# Patient Record
Sex: FEMALE | Race: BLACK OR AFRICAN AMERICAN | NOT HISPANIC OR LATINO | Employment: FULL TIME | URBAN - METROPOLITAN AREA
[De-identification: names, ages, dates, MRNs, and addresses within clinical notes are randomized per-mention and may not be internally consistent; named-entity substitution may affect disease eponyms.]

---

## 2017-01-07 ENCOUNTER — OFFICE VISIT (OUTPATIENT)
Dept: URGENT CARE | Facility: URGENT CARE | Age: 42
End: 2017-01-07
Payer: COMMERCIAL

## 2017-01-07 VITALS
TEMPERATURE: 98.5 F | WEIGHT: 190 LBS | DIASTOLIC BLOOD PRESSURE: 74 MMHG | OXYGEN SATURATION: 97 % | SYSTOLIC BLOOD PRESSURE: 124 MMHG | HEART RATE: 80 BPM | BODY MASS INDEX: 30.68 KG/M2

## 2017-01-07 DIAGNOSIS — R09.81 NASAL CONGESTION: Primary | ICD-10-CM

## 2017-01-07 DIAGNOSIS — R05.9 COUGH: ICD-10-CM

## 2017-01-07 PROCEDURE — 99213 OFFICE O/P EST LOW 20 MIN: CPT | Performed by: PHYSICIAN ASSISTANT

## 2017-01-07 RX ORDER — MOMETASONE FUROATE MONOHYDRATE 50 UG/1
2 SPRAY, METERED NASAL DAILY
Qty: 1 BOX | Refills: 1 | Status: SHIPPED | OUTPATIENT
Start: 2017-01-07 | End: 2017-02-24

## 2017-01-07 RX ORDER — CODEINE PHOSPHATE AND GUAIFENESIN 10; 100 MG/5ML; MG/5ML
1 SOLUTION ORAL EVERY 4 HOURS PRN
Qty: 180 ML | Refills: 0 | Status: SHIPPED | OUTPATIENT
Start: 2017-01-07 | End: 2017-02-24

## 2017-01-07 NOTE — NURSING NOTE
"Chief Complaint   Patient presents with     Urgent Care     Cough     since wednesday, sneezing, running nose        Initial /74 mmHg  Pulse 80  Temp(Src) 98.5  F (36.9  C) (Tympanic)  Wt 190 lb (86.183 kg)  SpO2 97% Estimated body mass index is 30.68 kg/(m^2) as calculated from the following:    Height as of 6/22/16: 5' 6\" (1.676 m).    Weight as of this encounter: 190 lb (86.183 kg).  BP completed using cuff size: regular    Nabila Villegas CMA.............................January 7, 2017 3:54 PM       "

## 2017-01-07 NOTE — PROGRESS NOTES
"SUBJECTIVE:   Marlen Solano is a 41 year old female presenting with a chief complaint of nasal congestion, rhinorrhea, \"cold symptoms\" and cough.  Has been sneezing.  No fevers . Denies SOB or chest pain  Onset of symptoms was 4 day(s) ago.  Course of illness is same   Severity mild  Current and Associated symptoms: none  Treatment measures tried include Fluids, OTC meds and Rest.  Predisposing factors include None.    Past Medical History   Diagnosis Date     NO ACTIVE PROBLEMS      Current Outpatient Prescriptions   Medication Sig Dispense Refill     mometasone (NASONEX) 50 MCG/ACT spray Spray 2 sprays into both nostrils daily 1 Box 1     guaiFENesin-codeine (ROBITUSSIN AC) 100-10 MG/5ML SOLN solution Take 5 mLs by mouth every 4 hours as needed for cough 180 mL 0     Social History   Substance Use Topics     Smoking status: Never Smoker      Smokeless tobacco: Not on file     Alcohol Use: No       ROS:  Review of systems negative except as stated above.    OBJECTIVE  :/74 mmHg  Pulse 80  Temp(Src) 98.5  F (36.9  C) (Tympanic)  Wt 190 lb (86.183 kg)  SpO2 97%  GENERAL APPEARANCE: healthy, alert and no distress  EYES: EOMI,  PERRL, conjunctiva clear  HENT: TM's normal bilaterally, nasal turbinates boggy with bluish hue, rhinorrhea clear, oral mucous membranes moist, no erythema noted and no sinus pain or pressure  NECK: supple, nontender, no lymphadenopathy  RESP: lungs clear to auscultation - no rales, rhonchi or wheezes  CV: regular rates and rhythm, normal S1 S2, no murmur noted  SKIN: no suspicious lesions or rashes    ASSESSMENT:  Cough and Nasal congestion [R09.81]    PLAN:  OTC decongestant/antihistamine, Warm packs to face and gargles.  Med as directed and OTC med for sx relief.  To FU with PCP as needed    See orders in Epic    "

## 2017-02-24 ENCOUNTER — OFFICE VISIT (OUTPATIENT)
Dept: INTERNAL MEDICINE | Facility: CLINIC | Age: 42
End: 2017-02-24
Payer: COMMERCIAL

## 2017-02-24 VITALS
SYSTOLIC BLOOD PRESSURE: 120 MMHG | TEMPERATURE: 98.3 F | OXYGEN SATURATION: 98 % | BODY MASS INDEX: 29.89 KG/M2 | HEIGHT: 66 IN | DIASTOLIC BLOOD PRESSURE: 80 MMHG | WEIGHT: 186 LBS | HEART RATE: 91 BPM

## 2017-02-24 DIAGNOSIS — R06.83 SNORING: ICD-10-CM

## 2017-02-24 DIAGNOSIS — R35.0 URINARY FREQUENCY: Primary | ICD-10-CM

## 2017-02-24 LAB
ALBUMIN UR-MCNC: NEGATIVE MG/DL
APPEARANCE UR: NORMAL
BACTERIA #/AREA URNS HPF: ABNORMAL /HPF
BILIRUB UR QL STRIP: NEGATIVE
COLOR UR AUTO: YELLOW
GLUCOSE UR STRIP-MCNC: NEGATIVE MG/DL
HGB UR QL STRIP: NEGATIVE
KETONES UR STRIP-MCNC: NEGATIVE MG/DL
LEUKOCYTE ESTERASE UR QL STRIP: NEGATIVE
MUCOUS THREADS #/AREA URNS LPF: PRESENT /LPF
NITRATE UR QL: NEGATIVE
NON-SQ EPI CELLS #/AREA URNS LPF: ABNORMAL /LPF
PH UR STRIP: 5.5 PH (ref 5–7)
RBC #/AREA URNS AUTO: ABNORMAL /HPF (ref 0–2)
SP GR UR STRIP: 1.02 (ref 1–1.03)
URN SPEC COLLECT METH UR: NORMAL
UROBILINOGEN UR STRIP-ACNC: 0.2 EU/DL (ref 0.2–1)
WBC #/AREA URNS AUTO: ABNORMAL /HPF (ref 0–2)

## 2017-02-24 PROCEDURE — 87086 URINE CULTURE/COLONY COUNT: CPT | Performed by: FAMILY MEDICINE

## 2017-02-24 PROCEDURE — 81001 URINALYSIS AUTO W/SCOPE: CPT | Performed by: FAMILY MEDICINE

## 2017-02-24 PROCEDURE — 99213 OFFICE O/P EST LOW 20 MIN: CPT | Performed by: FAMILY MEDICINE

## 2017-02-24 RX ORDER — SULFAMETHOXAZOLE/TRIMETHOPRIM 800-160 MG
1 TABLET ORAL 2 TIMES DAILY
Qty: 20 TABLET | Refills: 0 | Status: SHIPPED | OUTPATIENT
Start: 2017-02-24 | End: 2017-10-02

## 2017-02-24 NOTE — PATIENT INSTRUCTIONS
Take prescribed medication as directed.    We will reply on the urine culture.      Sleep evaluation to be done.

## 2017-02-24 NOTE — PROGRESS NOTES
"  CHIEF COMPLAINT    Frequent urination for 2 months.  Snoring.      HISTORY    She c/o frequent urination. Nocturia every 1 - 1 1/2 hours. No dysuria or fever.    Also c/o snoring. Hasn't had recent change in wt.     Patient Active Problem List   Diagnosis     CARDIOVASCULAR SCREENING; LDL GOAL LESS THAN 130       No current outpatient prescriptions on file.       REVIEW OF SYSTEMS    No back pain  No lower abd pain        Past Medical History   Diagnosis Date     NO ACTIVE PROBLEMS        EXAM  /80  Pulse 91  Temp 98.3  F (36.8  C) (Oral)  Ht 5' 6\" (1.676 m)  Wt 186 lb (84.4 kg)  SpO2 98%  BMI 30.02 kg/m2    Phar: looks like Mallampati class 3-4 post pharynx  Neck: no mass or thyromegaly  Chest: cl  No CVAT  Abd nontender    Results for orders placed or performed in visit on 02/24/17   UA reflex to Microscopic and Culture   Result Value Ref Range    Color Urine Yellow     Appearance Urine Slightly Cloudy     Glucose Urine Negative NEG mg/dL    Bilirubin Urine Negative NEG    Ketones Urine Negative NEG mg/dL    Specific Gravity Urine 1.025 1.003 - 1.035    Blood Urine Negative NEG    pH Urine 5.5 5.0 - 7.0 pH    Protein Albumin Urine Negative NEG mg/dL    Urobilinogen Urine 0.2 0.2 - 1.0 EU/dL    Nitrite Urine Negative NEG    Leukocyte Esterase Urine Negative NEG    Source Midstream Urine    Urine Microscopic   Result Value Ref Range    WBC Urine 2-5 (A) 0 - 2 /HPF    RBC Urine O - 2 0 - 2 /HPF    Squamous Epithelial /LPF Urine Moderate (A) FEW /LPF    Bacteria Urine Many (A) NEG /HPF    Mucous Urine Present (A) NEG /LPF       (R35.0) Urinary frequency  (primary encounter diagnosis)  Comment:   U/A minimally abnormal. U/C sent. I did start antibiotic  Plan: UA reflex to Microscopic and Culture, Urine         Microscopic, Urine Culture Aerobic Bacterial,         sulfamethoxazole-trimethoprim (BACTRIM         DS/SEPTRA DS) 800-160 MG per tablet          Urology consult if U/C unrevealing and if SX " continue.    (R06.83) Snoring  Comment:   Plan: SLEEP EVALUATION & MANAGEMENT REFERRAL - ADULT              Take prescribed medication as directed.    We will reply on the urine culture.      Sleep evaluation to be done.

## 2017-02-24 NOTE — MR AVS SNAPSHOT
After Visit Summary   2/24/2017    Marlen Solano    MRN: [id number]           Patient Information     Date Of Birth          1975        Visit Information        Provider Department      2/24/2017 11:00 AM Jaden Alford MD [facility]        Today's Diagnoses     Urinary frequency    -  1    Snoring          Care Instructions      Take prescribed medication as directed.    We will reply on the urine culture.      Sleep evaluation to be done.        Follow-ups after your visit        Additional Services     SLEEP EVALUATION & MANAGEMENT REFERRAL - ADULT       Please be aware that coverage of these services is subject to the terms and limitations of your health insurance plan.  Call member services at your health plan with any benefit or coverage questions.      Please bring the following to your appointment:    >>   List of current medications   >>   This referral request   >>   Any documents/labs given to you for this referral    [facility]  Ph [phone] (Age 18 and up)                  Future tests that were ordered for you today     Open Future Orders        Priority Expected Expires Ordered    SLEEP EVALUATION & MANAGEMENT REFERRAL - ADULT Routine  2/24/2018 2/24/2017            Who to contact     If you have questions or need follow up information about today's clinic visit or your schedule please contact [facility] directly at [phone].  Normal or non-critical lab and imaging results will be communicated to you by MyChart, letter or phone within 4 business days after the clinic has received the results. If you do not hear from us within 7 days, please contact the clinic through MyChart or phone. If you have a critical or abnormal lab result, we will notify you by phone as soon as possible.  Submit refill requests through [employer] or call your pharmacy and they will forward the refill request to us. Please allow 3 business  "days for your refill to be completed.          Additional Information About Your Visit        Melon Powerhart Information     [employer] lets you send messages to your doctor, view your test results, renew your prescriptions, schedule appointments and more. To sign up, go to [email or web address] . Click on \"Log in\" on the left side of the screen, which will take you to the Welcome page. Then click on \"Sign up Now\" on the right side of the page.     You will be asked to enter the access code listed below, as well as some personal information. Please follow the directions to create your username and password.     Your access code is: [email or web address]  Expires: 2017 11:56 AM     Your access code will  in 90 days. If you need help or a new code, please call your [place] clinic or [phone].        Care EveryWhere ID     This is your Care EveryWhere ID. This could be used by other organizations to access your [place] medical records  [id number]        Your Vitals Were     Pulse Temperature Height Pulse Oximetry BMI (Body Mass Index)       91 98.3  F (36.8  C) (Oral) 5' 6\" (1.676 m) 98% 30.02 kg/m2        Blood Pressure from Last 3 Encounters:   17 120/80   17 124/74   16 114/76    Weight from Last 3 Encounters:   17 186 lb (84.4 kg)   17 190 lb (86.2 kg)   16 184 lb (83.5 kg)              We Performed the Following     UA reflex to Microscopic and Culture     Urine Culture Aerobic Bacterial     Urine Microscopic          Today's Medication Changes          These changes are accurate as of: 17 11:56 AM.  If you have any questions, ask your nurse or doctor.               Start taking these medicines.        Dose/Directions    sulfamethoxazole-trimethoprim 800-160 MG per tablet   Commonly known as:  BACTRIM DS/SEPTRA DS   Used for:  Urinary frequency   Started by:  Jaden Alford MD        Dose:  1 tablet   Take 1 tablet by mouth 2 times daily   Quantity:  20 tablet "   Refills:  0            Where to get your medicines      These medications were sent to [facility] [place], [place] - [address]  [address] [facility] [address]     Phone:  [phone]     sulfamethoxazole-trimethoprim 800-160 MG per tablet                Primary Care Provider Office Phone # Fax #    Aure Ackerman LUIS Clemente [phone]       [facility] [address]  [facility] [address]        Thank you!     Thank you for choosing [facility]  for your care. Our goal is always to provide you with excellent care. Hearing back from our patients is one way we can continue to improve our services. Please take a few minutes to complete the written survey that you may receive in the mail after your visit with us. Thank you!             Your Updated Medication List - Protect others around you: Learn how to safely use, store and throw away your medicines at www.disposemymeds.org.          This list is accurate as of: 2/24/17 11:56 AM.  Always use your most recent med list.                   Brand Name Dispense Instructions for use    sulfamethoxazole-trimethoprim 800-160 MG per tablet    BACTRIM DS/SEPTRA DS    20 tablet    Take 1 tablet by mouth 2 times daily

## 2017-02-25 LAB
BACTERIA SPEC CULT: NO GROWTH
MICRO REPORT STATUS: NORMAL
SPECIMEN SOURCE: NORMAL

## 2017-06-29 ENCOUNTER — OFFICE VISIT (OUTPATIENT)
Dept: FAMILY MEDICINE | Facility: CLINIC | Age: 42
End: 2017-06-29
Payer: COMMERCIAL

## 2017-06-29 VITALS — HEART RATE: 60 BPM | TEMPERATURE: 99.1 F | SYSTOLIC BLOOD PRESSURE: 135 MMHG | DIASTOLIC BLOOD PRESSURE: 89 MMHG

## 2017-06-29 DIAGNOSIS — Z71.84 TRAVEL ADVICE ENCOUNTER: Primary | ICD-10-CM

## 2017-06-29 DIAGNOSIS — Z23 NEED FOR VACCINATION: ICD-10-CM

## 2017-06-29 PROCEDURE — 90734 MENACWYD/MENACWYCRM VACC IM: CPT | Mod: GA | Performed by: NURSE PRACTITIONER

## 2017-06-29 PROCEDURE — 90691 TYPHOID VACCINE IM: CPT | Mod: GA | Performed by: NURSE PRACTITIONER

## 2017-06-29 PROCEDURE — 90472 IMMUNIZATION ADMIN EACH ADD: CPT | Mod: GA | Performed by: NURSE PRACTITIONER

## 2017-06-29 PROCEDURE — 99402 PREV MED CNSL INDIV APPRX 30: CPT | Mod: 25 | Performed by: NURSE PRACTITIONER

## 2017-06-29 PROCEDURE — 90632 HEPA VACCINE ADULT IM: CPT | Mod: GA | Performed by: NURSE PRACTITIONER

## 2017-06-29 PROCEDURE — 90471 IMMUNIZATION ADMIN: CPT | Mod: GA | Performed by: NURSE PRACTITIONER

## 2017-06-29 RX ORDER — ATOVAQUONE AND PROGUANIL HYDROCHLORIDE 250; 100 MG/1; MG/1
1 TABLET, FILM COATED ORAL DAILY
Qty: 40 TABLET | Refills: 0 | Status: SHIPPED | OUTPATIENT
Start: 2017-06-29 | End: 2017-10-02

## 2017-06-29 RX ORDER — CIPROFLOXACIN 500 MG/1
500 TABLET, FILM COATED ORAL 2 TIMES DAILY
Qty: 6 TABLET | Refills: 0 | Status: SHIPPED | OUTPATIENT
Start: 2017-06-29 | End: 2017-10-02

## 2017-06-29 NOTE — PATIENT INSTRUCTIONS
Today June 29, 2017 you received the    Hepatitis A Vaccine -     Meningococcal (Menactra) Vaccine    Typhoid - injectable. This vaccine is valid for two years.   .    These appointments can be made as a NURSE ONLY visit.    **It is very important for the vaccinations to be given on the scheduled day(s), this helps ensure you receive the full effectiveness of the vaccine.**    Please call [facility] with any questions [phone]    Thank you for visiting [facility]

## 2017-06-29 NOTE — MR AVS SNAPSHOT
"              After Visit Summary   6/29/2017    Marlen Birchdo    MRN: [id number]           Patient Information     Date Of Birth          1975        Visit Information        Provider Department      6/29/2017 2:30 PM Venice Boyd APRN CNP [facility]        Today's Diagnoses     Travel advice encounter    -  1    Need for vaccination          Care Instructions    Today June 29, 2017 you received the    Hepatitis A Vaccine -     Meningococcal (Menactra) Vaccine    Typhoid - injectable. This vaccine is valid for two years.   .    These appointments can be made as a NURSE ONLY visit.    **It is very important for the vaccinations to be given on the scheduled day(s), this helps ensure you receive the full effectiveness of the vaccine.**    Please call [facility] with any questions [phone]    Thank you for visiting [facility]              Follow-ups after your visit        Who to contact     If you have questions or need follow up information about today's clinic visit or your schedule please contact [facility] directly at [phone].  Normal or non-critical lab and imaging results will be communicated to you by Tamagohart, letter or phone within 4 business days after the clinic has received the results. If you do not hear from us within 7 days, please contact the clinic through [employer] or phone. If you have a critical or abnormal lab result, we will notify you by phone as soon as possible.  Submit refill requests through [employer] or call your pharmacy and they will forward the refill request to us. Please allow 3 business days for your refill to be completed.          Additional Information About Your Visit        MyChart Information     [employer] lets you send messages to your doctor, view your test results, renew your prescriptions, schedule appointments and more. To sign up, go to [email or web address] . Click on \"Log in\" on the left " "side of the screen, which will take you to the Welcome page. Then click on \"Sign up Now\" on the right side of the page.     You will be asked to enter the access code listed below, as well as some personal information. Please follow the directions to create your username and password.     Your access code is: [email or web address]  Expires: 2017  3:21 PM     Your access code will  in 90 days. If you need help or a new code, please call your [place] clinic or [phone].        Care EveryWhere ID     This is your Care EveryWhere ID. This could be used by other organizations to access your [place] medical records  [id number]        Your Vitals Were     Pulse Temperature Breastfeeding?             60 99.1  F (37.3  C) (Oral) No          Blood Pressure from Last 3 Encounters:   17 135/89   17 120/80   17 124/74    Weight from Last 3 Encounters:   17 186 lb (84.4 kg)   17 190 lb (86.2 kg)   16 184 lb (83.5 kg)              We Performed the Following     HEPA VACCINE ADULT IM     MENINGOCOCCAL VACCINE,IM (MENACTRA)     TYPHOID VACCINE, IM          Today's Medication Changes          These changes are accurate as of: 17  3:21 PM.  If you have any questions, ask your nurse or doctor.               Start taking these medicines.        Dose/Directions    atovaquone-proguanil 250-100 MG per tablet   Commonly known as:  MALARONE   Used for:  Need for vaccination, Travel advice encounter   Started by:  Venice Boyd APRN CNP        Dose:  1 tablet   Take 1 tablet by mouth daily Start 2 days before exposure to Malaria and continue daily till  7 days after exposure.   Quantity:  40 tablet   Refills:  0       ciprofloxacin 500 MG tablet   Commonly known as:  CIPRO   Used for:  Travel advice encounter   Started by:  Venice Boyd APRN CNP        Dose:  500 mg   Take 1 tablet (500 mg) by mouth 2 times daily Take 1 tablet two times a day for up to 3 days for severe diarrhea " during travel.   Quantity:  6 tablet   Refills:  0            Where to get your medicines      These medications were sent to [facility] [place], [place] - [address]  [address] [facility] [address]     Phone:  [phone]     atovaquone-proguanil 250-100 MG per tablet    ciprofloxacin 500 MG tablet                Primary Care Provider Office Phone # Fax #    Aure Ackerman Chyna, LUIS [phone]       [facility] [address] [facility] [address]        Equal Access to Services     [facility]: Hadii aad ku hadasho Soomaali, waaxda luqadaha, qaybta kaalmada adeegyada, waxay amandain hayaan adeeg khflavio martin . So [facility] [phone].    ATENCIÓN: Si habla español, tiene a hill disposición servicios gratuitos de asistencia lingüística. [facility] [phone].    We comply with applicable federal civil rights laws and [place] laws. We do not discriminate on the basis of race, color, national origin, age, disability sex, sexual orientation or gender identity.            Thank you!     Thank you for choosing [facility]  for your care. Our goal is always to provide you with excellent care. Hearing back from our patients is one way we can continue to improve our services. Please take a few minutes to complete the written survey that you may receive in the mail after your visit with us. Thank you!             Your Updated Medication List - Protect others around you: Learn how to safely use, store and throw away your medicines at www.disposemymeds.org.          This list is accurate as of: 6/29/17  3:21 PM.  Always use your most recent med list.                   Brand Name Dispense Instructions for use Diagnosis    atovaquone-proguanil 250-100 MG per tablet    MALARONE    40 tablet    Take 1 tablet by mouth daily Start 2 days before exposure to Malaria and continue daily till  7 days after exposure.    Need for vaccination, Travel advice  encounter       ciprofloxacin 500 MG tablet    CIPRO    6 tablet    Take 1 tablet (500 mg) by mouth 2 times daily Take 1 tablet two times a day for up to 3 days for severe diarrhea during travel.    Travel advice encounter       sulfamethoxazole-trimethoprim 800-160 MG per tablet    BACTRIM DS/SEPTRA DS    20 tablet    Take 1 tablet by mouth 2 times daily    Urinary frequency

## 2017-06-29 NOTE — PROGRESS NOTES
Nurse Note      Itinerary:  [facility]      Departure Date: 07/14/2017       Return Date: 08/15/2017      Length of Trip 4 weeks      Reason for Travel: Tourism           Urban or rural: urban      Accommodations: Family home        IMMUNIZATION HISTORY  Have you received any immunizations within the past 4 weeks?  No  Have you ever fainted from having your blood drawn or from an injection?  No  Have you ever had a fever reaction to vaccination?  No  Have you ever had any bad reaction or side effect from any vaccination?  No  Have you ever had hepatitis A or B vaccine?  No  Do you live (or work closely) with anyone who has AIDS, an AIDS-like condition, any other immune disorder or who is on chemotherapy for cancer?  No  Do you have a family history of immunodeficiency?  No  Have you received any injection of immune globulin or any blood products during the past 12 months?  No    Patient roomed by DRAKE Ahumadaclayton Solano is a 42 year old female seen today with spouse for counsultation for international travel to [facility] for Visiting friends and relatives.  Patient will be departing in  2 week(s) and staying for   1 month(s) and  traveling with spouse.      Patient itinerary :  will be in the urban region of [facility] which presents risk for Malaria, Yellow Fever, Dengue Fever, Chikungungya, Zika,  Trypanosomiasis, Schistosomiasis, Rabies, food borne illnesses, motor vehicle accidents, Typhoid, Leishmaniasis and Lassa Fever. exposure.      Patient's activities will include visiting friends and relatives.    Patient's country of birth is [facility]    Special medical concerns: none  Pre-travel questionnaire was completed by patient and reviewed by provider.     Vitals: /89  Pulse 60  Temp 99.1  F (37.3  C) (Oral)  Breastfeeding? No  BMI= There is no height or weight on file to calculate BMI.    EXAM:  General:  Well-nourished, well-developed in no acute distress.  Appears to be stated age,  interacts appropriately and expresses understanding of information given to patient.    Current Outpatient Prescriptions   Medication Sig Dispense Refill     atovaquone-proguanil (MALARONE) 250-100 MG per tablet Take 1 tablet by mouth daily Start 2 days before exposure to Malaria and continue daily till  7 days after exposure. 40 tablet 0     ciprofloxacin (CIPRO) 500 MG tablet Take 1 tablet (500 mg) by mouth 2 times daily Take 1 tablet two times a day for up to 3 days for severe diarrhea during travel. 6 tablet 0     sulfamethoxazole-trimethoprim (BACTRIM DS/SEPTRA DS) 800-160 MG per tablet Take 1 tablet by mouth 2 times daily 20 tablet 0     Patient Active Problem List   Diagnosis     CARDIOVASCULAR SCREENING; LDL GOAL LESS THAN 130     No Known Allergies      Immunizations discussed include:   Hepatitis A:  Ordered/given today, risks, benefits and side effects reviewed  Hepatitis B: Up to date  Influenza: Up to date  Typhoid: Ordered/given today, risks, benefits and side effects reviewed  Rabies: Declined  reviewed managment of a animal bite or scratch (washing wound, seek medical care within 24 hours for post exposure prophylaxis )  Yellow Fever: Up to date  Japanese Encephalitis: Not indicated  Meningococcus: Ordered/given today, risks, benefits and side effects reviewed  Tetanus/Diphtheria: Up to date  Measles/Mumps/Rubella: Up to date  Cholera: Not needed  Polio: Up to date  Pneumococcal: Under age of 65  Varicella: Up to date  Zostavax:  Not indicated  HPV:  Not indicated  TB:  Low risk    Altitude Exposure on this trip: no    ASSESSMENT/PLAN:    ICD-10-CM    1. Travel advice encounter Z71.89 HEPA VACCINE ADULT IM     TYPHOID VACCINE, IM     MENINGOCOCCAL VACCINE,IM (MENACTRA)     atovaquone-proguanil (MALARONE) 250-100 MG per tablet     ciprofloxacin (CIPRO) 500 MG tablet   2. Need for vaccination Z23 HEPA VACCINE ADULT IM     TYPHOID VACCINE, IM     MENINGOCOCCAL VACCINE,IM (MENACTRA)      atovaquone-proguanil (MALARONE) 250-100 MG per tablet     I have reviewed general recommendations for safe travel   including: food/water precautions, insect precautions, safer sex   practices given high prevalence of Zika, HIV and other STDs,   roadway safety. Educational materials and [employer] report provided.    Malaraia prophylaxis recommended: Malarone  Symptomatic treatment for traveler's diarrhea: ciprofloxacin  Altitude illness prevention and treatment: no      Evacuation insurance advised and resources were provided to patient.    Total visit time 30 minutes  with over 50% of time spent counseling patient as detailed above.    Venice Boyd CNP

## 2017-10-02 ENCOUNTER — OFFICE VISIT (OUTPATIENT)
Dept: INTERNAL MEDICINE | Facility: CLINIC | Age: 42
End: 2017-10-02
Payer: COMMERCIAL

## 2017-10-02 VITALS
HEIGHT: 66 IN | SYSTOLIC BLOOD PRESSURE: 102 MMHG | RESPIRATION RATE: 16 BRPM | OXYGEN SATURATION: 96 % | BODY MASS INDEX: 30.44 KG/M2 | TEMPERATURE: 98.5 F | HEART RATE: 78 BPM | WEIGHT: 189.4 LBS | DIASTOLIC BLOOD PRESSURE: 82 MMHG

## 2017-10-02 DIAGNOSIS — R79.9 ABNORMAL BLOOD CHEMISTRY: ICD-10-CM

## 2017-10-02 DIAGNOSIS — N64.4 BREAST PAIN, LEFT: ICD-10-CM

## 2017-10-02 DIAGNOSIS — R53.83 FATIGUE, UNSPECIFIED TYPE: Primary | ICD-10-CM

## 2017-10-02 LAB
ANION GAP SERPL CALCULATED.3IONS-SCNC: 4 MMOL/L (ref 3–14)
BASOPHILS # BLD AUTO: 0 10E9/L (ref 0–0.2)
BASOPHILS NFR BLD AUTO: 0.4 %
BUN SERPL-MCNC: 14 MG/DL (ref 7–30)
CALCIUM SERPL-MCNC: 8.3 MG/DL (ref 8.5–10.1)
CHLORIDE SERPL-SCNC: 108 MMOL/L (ref 94–109)
CO2 SERPL-SCNC: 30 MMOL/L (ref 20–32)
CREAT SERPL-MCNC: 1.06 MG/DL (ref 0.52–1.04)
DIFFERENTIAL METHOD BLD: NORMAL
EOSINOPHIL # BLD AUTO: 0.1 10E9/L (ref 0–0.7)
EOSINOPHIL NFR BLD AUTO: 2.1 %
ERYTHROCYTE [DISTWIDTH] IN BLOOD BY AUTOMATED COUNT: 14.8 % (ref 10–15)
GFR SERPL CREATININE-BSD FRML MDRD: 56 ML/MIN/1.7M2
GLUCOSE SERPL-MCNC: 98 MG/DL (ref 70–99)
HCT VFR BLD AUTO: 40.5 % (ref 35–47)
HGB BLD-MCNC: 14.4 G/DL (ref 11.7–15.7)
LYMPHOCYTES # BLD AUTO: 2.1 10E9/L (ref 0.8–5.3)
LYMPHOCYTES NFR BLD AUTO: 44.4 %
MCH RBC QN AUTO: 27.9 PG (ref 26.5–33)
MCHC RBC AUTO-ENTMCNC: 35.6 G/DL (ref 31.5–36.5)
MCV RBC AUTO: 79 FL (ref 78–100)
MONOCYTES # BLD AUTO: 0.4 10E9/L (ref 0–1.3)
MONOCYTES NFR BLD AUTO: 7.3 %
NEUTROPHILS # BLD AUTO: 2.2 10E9/L (ref 1.6–8.3)
NEUTROPHILS NFR BLD AUTO: 45.8 %
PLATELET # BLD AUTO: 264 10E9/L (ref 150–450)
POTASSIUM SERPL-SCNC: 4 MMOL/L (ref 3.4–5.3)
RBC # BLD AUTO: 5.16 10E12/L (ref 3.8–5.2)
SODIUM SERPL-SCNC: 142 MMOL/L (ref 133–144)
TSH SERPL DL<=0.005 MIU/L-ACNC: 0.99 MU/L (ref 0.4–4)
WBC # BLD AUTO: 4.8 10E9/L (ref 4–11)

## 2017-10-02 PROCEDURE — 36415 COLL VENOUS BLD VENIPUNCTURE: CPT | Performed by: INTERNAL MEDICINE

## 2017-10-02 PROCEDURE — 99214 OFFICE O/P EST MOD 30 MIN: CPT | Performed by: INTERNAL MEDICINE

## 2017-10-02 PROCEDURE — 80048 BASIC METABOLIC PNL TOTAL CA: CPT | Performed by: INTERNAL MEDICINE

## 2017-10-02 PROCEDURE — 85025 COMPLETE CBC W/AUTO DIFF WBC: CPT | Performed by: INTERNAL MEDICINE

## 2017-10-02 PROCEDURE — 84443 ASSAY THYROID STIM HORMONE: CPT | Performed by: INTERNAL MEDICINE

## 2017-10-02 NOTE — PROGRESS NOTES
"  SUBJECTIVE:                                                    Marlen Solano is a 42 year old female who presents to clinic today for the following health issues:      HPI    Complaints of left breast pain. This has been for a few a years. It can be fairly brief. It is sharp pain. No lumps noted. Stable.     Fatigue for a month. Only 6 hours sleep. Up to bathroom once. She snores, had been referred to sleep clinic but did not go. She has no hypersomnolence.   She thought she had lost weight but stable here. No hair loss, skin changes, heartburn, diarrhea, constipation, palpitations, tremor.           Patient Active Problem List   Diagnosis     CARDIOVASCULAR SCREENING; LDL GOAL LESS THAN 130     No current outpatient prescriptions on file.      Social History   Substance Use Topics     Smoking status: Never Smoker     Smokeless tobacco: Never Used     Alcohol use No        ROS:  No fever, chills, constipation, diarrhea, tremor, hair loss, heartburn, hypersomnolence, dyspnea,    OBJECTIVE:     /82  Pulse 78  Temp 98.5  F (36.9  C) (Oral)  Resp 16  Ht 5' 6\" (1.676 m)  Wt 189 lb 6.4 oz (85.9 kg)  SpO2 96%  BMI 30.57 kg/m2  Body mass index is 30.57 kg/(m^2).    No cervical nodes, supraclavicular or infraclavicular nodes  No thyromegaly  Breasts: no masses, no tenderness, no axillary nodes      ASSESSMENT/PLAN:             1. Fatigue, unspecified type  May be due to inadequate sleep. Check labs, increase sleep to 7-8 hours, consider sleep study if not improving  - CBC with platelets differential  - Basic metabolic panel  - TSH with free T4 reflex    2. Breast pain, left  Recommend imaging, likely benign since present for years.   - MA Diagnostic Digital Bilateral; Future        Mary Ellen White MD  [facility]  "

## 2017-10-02 NOTE — NURSING NOTE
"Chief Complaint   Patient presents with     Breast Pain     left breast under the armpit pain- have had for a long time, pain persistent.      Weight Loss     uninteded weight loss-        Initial /82  Pulse 78  Temp 98.5  F (36.9  C) (Oral)  Resp 16  Ht 5' 6\" (1.676 m)  Wt 189 lb 6.4 oz (85.9 kg)  SpO2 96%  BMI 30.57 kg/m2 Estimated body mass index is 30.57 kg/(m^2) as calculated from the following:    Height as of this encounter: 5' 6\" (1.676 m).    Weight as of this encounter: 189 lb 6.4 oz (85.9 kg).  Medication Reconciliation: complete     Pt decline flu vaccine.     Moise Gage CMA      "

## 2017-10-02 NOTE — MR AVS SNAPSHOT
"              After Visit Summary   10/2/2017    Marlen Solano    MRN: [id number]           Patient Information     Date Of Birth          1975        Visit Information        Provider Department      10/2/2017 7:40 AM Mary Ellen White MD [facility]        Today's Diagnoses     Fatigue, unspecified type    -  1    Breast pain, left           Follow-ups after your visit        Future tests that were ordered for you today     Open Future Orders        Priority Expected Expires Ordered    MA Diagnostic Digital Bilateral Routine  10/2/2018 10/2/2017            Who to contact     If you have questions or need follow up information about today's clinic visit or your schedule please contact [facility] directly at [phone].  Normal or non-critical lab and imaging results will be communicated to you by MyChart, letter or phone within 4 business days after the clinic has received the results. If you do not hear from us within 7 days, please contact the clinic through MyChart or phone. If you have a critical or abnormal lab result, we will notify you by phone as soon as possible.  Submit refill requests through [employer] or call your pharmacy and they will forward the refill request to us. Please allow 3 business days for your refill to be completed.          Additional Information About Your Visit        MyChart Information     [employer] lets you send messages to your doctor, view your test results, renew your prescriptions, schedule appointments and more. To sign up, go to [email or web address] . Click on \"Log in\" on the left side of the screen, which will take you to the Welcome page. Then click on \"Sign up Now\" on the right side of the page.     You will be asked to enter the access code listed below, as well as some personal information. Please follow the directions to create your username and password.     Your access code is: [email or web address]  Expires: 12/31/2017  8:29 AM     Your " "access code will  in 90 days. If you need help or a new code, please call your [place] clinic or [phone].        Care EveryWhere ID     This is your Care EveryWhere ID. This could be used by other organizations to access your [place] medical records  [id number]        Your Vitals Were     Pulse Temperature Respirations Height Pulse Oximetry BMI (Body Mass Index)    78 98.5  F (36.9  C) (Oral) 16 5' 6\" (1.676 m) 96% 30.57 kg/m2       Blood Pressure from Last 3 Encounters:   10/02/17 102/82   17 135/89   17 120/80    Weight from Last 3 Encounters:   10/02/17 189 lb 6.4 oz (85.9 kg)   17 186 lb (84.4 kg)   17 190 lb (86.2 kg)              We Performed the Following     Basic metabolic panel     CBC with platelets differential     TSH with free T4 reflex        Primary Care Provider Office Phone # Fax #    Aure [place] [phone]       [address] [facility] [address]        Equal Access to Services     [facility] : Hadii aad ku hadasho Soomaali, waaxda luqadaha, qaybta kaalmada adeegyada, angel juarez haydamaso martin . So [facility] [phone].    ATENCIÓN: Si habla español, tiene a hill disposición servicios gratuitos de asistencia lingüística. [facility] [phone].    We comply with applicable federal civil rights laws and [place] laws. We do not discriminate on the basis of race, color, national origin, age, disability, sex, sexual orientation, or gender identity.            Thank you!     Thank you for choosing [facility]  for your care. Our goal is always to provide you with excellent care. Hearing back from our patients is one way we can continue to improve our services. Please take a few minutes to complete the written survey that you may receive in the mail after your visit with us. Thank you!             Your Updated Medication List - Protect others around you: Learn how to safely use, store and throw away your " medicines at www.disposemymeds.org.      Notice  As of 10/2/2017  8:29 AM    You have not been prescribed any medications.

## 2017-10-02 NOTE — LETTER
[place] [address]  [facility] [address]  [phone]        May 16, 2018    Marlen Solano  [address] [place] [address]              Dear Marlen Solano    This is to remind you that your NON-FASTING LAB is due.    You may call our office at [phone] to schedule an appointment.    Please disregard this notice if you have already had your labs drawn or made an appointment.        Sincerely,        Mary Ellen White MD

## 2017-10-02 NOTE — LETTER
[place] [address]  [facility] [address]  [phone]        January 18, 2018    Marlen Solano  [address] [place] [address]              Dear Marlen Solano    This is to remind you that your non-fasting lab is due.    You may call our office at [phone] to schedule an appointment.    Please disregard this notice if you have already had your labs drawn or made an appointment.        Sincerely,        Mary Ellen White MD

## 2018-07-02 ENCOUNTER — TELEPHONE (OUTPATIENT)
Dept: INTERNAL MEDICINE | Facility: CLINIC | Age: 43
End: 2018-07-02

## 2018-07-02 DIAGNOSIS — R79.89 ABNORMAL SERUM CREATININE LEVEL: ICD-10-CM

## 2018-07-02 DIAGNOSIS — R79.89 ABNORMAL SERUM CREATININE LEVEL: Primary | ICD-10-CM

## 2018-07-02 LAB
ANION GAP SERPL CALCULATED.3IONS-SCNC: 5 MMOL/L (ref 3–14)
BUN SERPL-MCNC: 13 MG/DL (ref 7–30)
CALCIUM SERPL-MCNC: 8.5 MG/DL (ref 8.5–10.1)
CHLORIDE SERPL-SCNC: 107 MMOL/L (ref 94–109)
CO2 SERPL-SCNC: 28 MMOL/L (ref 20–32)
CREAT SERPL-MCNC: 0.9 MG/DL (ref 0.52–1.04)
GFR SERPL CREATININE-BSD FRML MDRD: 67 ML/MIN/1.7M2
GLUCOSE SERPL-MCNC: 117 MG/DL (ref 70–99)
POTASSIUM SERPL-SCNC: 3.9 MMOL/L (ref 3.4–5.3)
SODIUM SERPL-SCNC: 140 MMOL/L (ref 133–144)

## 2018-07-02 PROCEDURE — 80048 BASIC METABOLIC PNL TOTAL CA: CPT | Performed by: INTERNAL MEDICINE

## 2018-07-02 PROCEDURE — 36415 COLL VENOUS BLD VENIPUNCTURE: CPT | Performed by: INTERNAL MEDICINE

## 2018-07-02 NOTE — TELEPHONE ENCOUNTER
Patient had appointment scheduled for today at 8:00 am and is fasting, but Dr. White has called in.  Patient rescheduled OV, but is requesting lab work be drawn while she is here today.  Please order.

## 2018-07-05 ENCOUNTER — OFFICE VISIT (OUTPATIENT)
Dept: INTERNAL MEDICINE | Facility: CLINIC | Age: 43
End: 2018-07-05
Payer: COMMERCIAL

## 2018-07-05 VITALS
RESPIRATION RATE: 20 BRPM | OXYGEN SATURATION: 98 % | DIASTOLIC BLOOD PRESSURE: 68 MMHG | HEIGHT: 66 IN | SYSTOLIC BLOOD PRESSURE: 114 MMHG | HEART RATE: 90 BPM | WEIGHT: 194.8 LBS | BODY MASS INDEX: 31.31 KG/M2

## 2018-07-05 DIAGNOSIS — R42 DIZZINESS: ICD-10-CM

## 2018-07-05 DIAGNOSIS — R79.9 ABNORMAL BLOOD CHEMISTRY: Primary | ICD-10-CM

## 2018-07-05 DIAGNOSIS — R73.09 ABNORMAL BLOOD SUGAR: ICD-10-CM

## 2018-07-05 PROCEDURE — 99213 OFFICE O/P EST LOW 20 MIN: CPT | Performed by: INTERNAL MEDICINE

## 2018-07-05 ASSESSMENT — PAIN SCALES - GENERAL: PAINLEVEL: NO PAIN (0)

## 2018-07-05 NOTE — MR AVS SNAPSHOT
"              After Visit Summary   7/5/2018    Marlen Solano    MRN: [id number]           Patient Information     Date Of Birth          1975        Visit Information        Provider Department      7/5/2018 11:00 AM Mary Ellen White MD [facility]        Today's Diagnoses     Abnormal blood chemistry    -  1    Abnormal blood sugar        Dizziness           Follow-ups after your visit        Who to contact     If you have questions or need follow up information about today's clinic visit or your schedule please contact [facility] directly at [phone].  Normal or non-critical lab and imaging results will be communicated to you by MyChart, letter or phone within 4 business days after the clinic has received the results. If you do not hear from us within 7 days, please contact the clinic through MyChart or phone. If you have a critical or abnormal lab result, we will notify you by phone as soon as possible.  Submit refill requests through [employer] or call your pharmacy and they will forward the refill request to us. Please allow 3 business days for your refill to be completed.          Additional Information About Your Visit        Care EveryWhere ID     This is your Care EveryWhere ID. This could be used by other organizations to access your [place] medical records  [id number]        Your Vitals Were     Pulse Respirations Height Pulse Oximetry Breastfeeding? BMI (Body Mass Index)    90 20 5' 6\" (1.676 m) 98% No 31.44 kg/m2       Blood Pressure from Last 3 Encounters:   07/05/18 114/68   10/02/17 102/82   06/29/17 135/89    Weight from Last 3 Encounters:   07/05/18 194 lb 12.8 oz (88.4 kg)   10/02/17 189 lb 6.4 oz (85.9 kg)   02/24/17 186 lb (84.4 kg)              Today, you had the following     No orders found for display       Primary Care Provider Office Phone # Fax #    Mary Ellen White [phone]       [address]  [facility] [address]      "   Equal Access to Services     [facility] : Hadii mateusz Perdue, walatanya chinchilla, alicjaangel mas. So [facility] [phone].    ATENCIÓN: Si habla español, tiene a hill disposición servicios gratuitos de asistencia lingüística. Llame al [phone].    We comply with applicable federal civil rights laws and [place] laws. We do not discriminate on the basis of race, color, national origin, age, disability, sex, sexual orientation, or gender identity.            Thank you!     Thank you for choosing [facility]  for your care. Our goal is always to provide you with excellent care. Hearing back from our patients is one way we can continue to improve our services. Please take a few minutes to complete the written survey that you may receive in the mail after your visit with us. Thank you!             Your Updated Medication List - Protect others around you: Learn how to safely use, store and throw away your medicines at www.disposemymeds.org.      Notice  As of 7/5/2018  1:01 PM    You have not been prescribed any medications.

## 2018-07-05 NOTE — NURSING NOTE
"/68 (BP Location: Right arm, Patient Position: Sitting, Cuff Size: Adult Large)  Pulse 90  Resp 20  Ht 5' 6\" (1.676 m)  Wt 194 lb 12.8 oz (88.4 kg)  SpO2 98%  Breastfeeding? No  BMI 31.44 kg/m2    Purvi Marsh, Medical Assistant    "

## 2018-08-23 ENCOUNTER — OFFICE VISIT (OUTPATIENT)
Dept: INTERNAL MEDICINE | Facility: CLINIC | Age: 43
End: 2018-08-23
Payer: COMMERCIAL

## 2018-08-23 VITALS
OXYGEN SATURATION: 96 % | TEMPERATURE: 98.8 F | BODY MASS INDEX: 31.76 KG/M2 | RESPIRATION RATE: 16 BRPM | SYSTOLIC BLOOD PRESSURE: 128 MMHG | WEIGHT: 197.6 LBS | DIASTOLIC BLOOD PRESSURE: 78 MMHG | HEART RATE: 90 BPM | HEIGHT: 66 IN

## 2018-08-23 DIAGNOSIS — L91.0 SCARRING, KELOID: ICD-10-CM

## 2018-08-23 DIAGNOSIS — N64.4 PAIN OF LEFT BREAST: Primary | ICD-10-CM

## 2018-08-23 DIAGNOSIS — D22.9 CHANGING NEVUS: ICD-10-CM

## 2018-08-23 PROCEDURE — 99214 OFFICE O/P EST MOD 30 MIN: CPT | Performed by: PHYSICIAN ASSISTANT

## 2018-08-23 ASSESSMENT — PAIN SCALES - GENERAL: PAINLEVEL: NO PAIN (0)

## 2018-08-23 NOTE — PATIENT INSTRUCTIONS
I will have you get diagnostic testing of your breast pain at the breast center. We will follow up with results.   For the changing mole on your breast I would have you see general surgery for removal and biopsy.

## 2018-08-23 NOTE — MR AVS SNAPSHOT
After Visit Summary   8/23/2018    Marlen MIMS Kpayedo    MRN: [id number]           Patient Information     Date Of Birth          1975        Visit Information        Provider Department      8/23/2018 8:00 AM Kelsi oFx PA-C [facility]        Today's Diagnoses     Pain of left breast    -  1    Changing nevus        Scarring, keloid          Care Instructions    I will have you get diagnostic testing of your breast pain at the breast center. We will follow up with results.   For the changing mole on your breast I would have you see general surgery for removal and biopsy.           Follow-ups after your visit        Additional Services     GENERAL SURG ADULT REFERRAL       Your provider has referred you to: FM[facility]: [facility] [place] [phone]   [email or web address]    Please be aware that coverage of these services is subject to the terms and limitations of your health insurance plan.  Call member services at your health plan with any benefit or coverage questions.      Please bring the following with you to your appointment:    (1) Any X-Rays, CTs or MRIs which have been performed.  Contact the facility where they were done to arrange for  prior to your scheduled appointment.   (2) List of current medications   (3) This referral request   (4) Any documents/labs given to you for this referral                  Follow-up notes from your care team     Return if symptoms worsen or fail to improve.      Future tests that were ordered for you today     Open Future Orders        Priority Expected Expires Ordered    US Breast Left Complete 4 Quadrants Routine  8/23/2019 8/23/2018    MA Diagnostic Digital Left Routine  8/23/2019 8/23/2018    MA Screening Digital Right Routine  8/23/2019 8/23/2018            Who to contact     If you have questions or need follow up information about today's clinic visit or your  "schedule please contact [facility] directly at [phone].  Normal or non-critical lab and imaging results will be communicated to you by MyChart, letter or phone within 4 business days after the clinic has received the results. If you do not hear from us within 7 days, please contact the clinic through MyChart or phone. If you have a critical or abnormal lab result, we will notify you by phone as soon as possible.  Submit refill requests through [employer] or call your pharmacy and they will forward the refill request to us. Please allow 3 business days for your refill to be completed.          Additional Information About Your Visit        Care EveryWhere ID     This is your Care EveryWhere ID. This could be used by other organizations to access your [place] medical records  [id number]        Your Vitals Were     Pulse Temperature Respirations Height Pulse Oximetry Breastfeeding?    90 98.8  F (37.1  C) (Oral) 16 5' 6\" (1.676 m) 96% No    BMI (Body Mass Index)                   31.89 kg/m2            Blood Pressure from Last 3 Encounters:   08/23/18 128/78   07/05/18 114/68   10/02/17 102/82    Weight from Last 3 Encounters:   08/23/18 197 lb 9.6 oz (89.6 kg)   07/05/18 194 lb 12.8 oz (88.4 kg)   10/02/17 189 lb 6.4 oz (85.9 kg)              We Performed the Following     GENERAL SURG ADULT REFERRAL        Primary Care Provider Office Phone # Fax #    Mary Ellen White [phone]       [address] [facility] [address]  [facility] [address]        Equal Access to Services     ESTRELLA RUSH : Hadii mateusz ku hadasho Soanastasia, waaxda luqadaha, qaybta kaalmada angel raygoza. So [facility] [phone].    ATENCIÓN: Si habla español, tiene a hill disposición servicios gratuitos de asistencia lingüística. Llame al [phone].    We comply with applicable federal civil rights laws and [place] laws. We do not discriminate on the basis of race, color, national origin, " age, disability, sex, sexual orientation, or gender identity.            Thank you!     Thank you for choosing [facility]  for your care. Our goal is always to provide you with excellent care. Hearing back from our patients is one way we can continue to improve our services. Please take a few minutes to complete the written survey that you may receive in the mail after your visit with us. Thank you!             Your Updated Medication List - Protect others around you: Learn how to safely use, store and throw away your medicines at www.disposemymeds.org.      Notice  As of 8/23/2018  8:14 AM    You have not been prescribed any medications.

## 2018-08-23 NOTE — PROGRESS NOTES
"  SUBJECTIVE:   Marlen Solano is a 43 year old female who presents to clinic today for the following health issues:    Left breast pain back again 4 days ago.    Patient is here in clinic with concern about left breast pain that began about 4 days ago. She has a history of pain to the left breast in the past that was evaluated and did resolve but she has had continued pain since it began 4 days ago. She denies any palpable mass but notes that the pain is under a mole that she has had on the outer left breast for many years that she reports has gotten bigger and darker in the last few years. She does have a history of keloid scars from previous procedures and biopsies.   -------------------------------------    Problem list and histories reviewed & adjusted, as indicated.  Additional history: as documented    BP Readings from Last 3 Encounters:   08/23/18 128/78   07/05/18 114/68   10/02/17 102/82    Wt Readings from Last 3 Encounters:   08/23/18 197 lb 9.6 oz (89.6 kg)   07/05/18 194 lb 12.8 oz (88.4 kg)   10/02/17 189 lb 6.4 oz (85.9 kg)           Reviewed and updated as needed this visit by clinical staff  Tobacco  Allergies  Meds  Med Hx  Surg Hx  Fam Hx  Soc Hx      Reviewed and updated as needed this visit by Provider         ROS:  Constitutional, HEENT, cardiovascular, pulmonary, gi and gu systems are negative, except as otherwise noted.    OBJECTIVE:     /78 (BP Location: Right arm, Patient Position: Chair, Cuff Size: Adult Large)  Pulse 90  Temp 98.8  F (37.1  C) (Oral)  Resp 16  Ht 5' 6\" (1.676 m)  Wt 197 lb 9.6 oz (89.6 kg)  SpO2 96%  Breastfeeding? No  BMI 31.89 kg/m2  Body mass index is 31.89 kg/(m^2).  GENERAL: healthy, alert and no distress  BREAST: there is tenderness to palpation of the left breast in the area where patient has a large approximately 1 in diameter mole, no palpable masses on exam  MS: no gross musculoskeletal defects noted, no edema  SKIN: there are multiple " keloid scars to the chest, there is a raised brown/black round nevus on the outer left breast    Diagnostic Test Results:  none     ASSESSMENT/PLAN:       ICD-10-CM    1. Pain of left breast N64.4 US Breast Left Complete 4 Quadrants     MA Diagnostic Digital Left     MA Screening Digital Right   2. Changing nevus D22.9 GENERAL SURG ADULT REFERRAL   3. Scarring, keloid L91.0 GENERAL SURG ADULT REFERRAL       I will have patient get mammogram and ultrasound to further evaluate breast pain. I will also refer her to general surgery for evaluation and biopsy vs. Removal of changing nevi.     See Patient Instructions    Kelsi Fox PA-C  [facility]

## 2018-08-24 ENCOUNTER — HOSPITAL ENCOUNTER (OUTPATIENT)
Dept: ULTRASOUND IMAGING | Facility: CLINIC | Age: 43
End: 2018-08-24
Attending: PHYSICIAN ASSISTANT
Payer: COMMERCIAL

## 2018-08-24 ENCOUNTER — HOSPITAL ENCOUNTER (OUTPATIENT)
Dept: MAMMOGRAPHY | Facility: CLINIC | Age: 43
Discharge: HOME | End: 2018-08-24
Attending: PHYSICIAN ASSISTANT | Admitting: PHYSICIAN ASSISTANT
Payer: COMMERCIAL

## 2018-08-24 DIAGNOSIS — N64.4 PAIN OF LEFT BREAST: ICD-10-CM

## 2018-08-24 PROCEDURE — 76642 ULTRASOUND BREAST LIMITED: CPT | Mod: 50

## 2018-08-24 PROCEDURE — 77066 DX MAMMO INCL CAD BI: CPT

## 2018-08-28 ENCOUNTER — HOSPITAL ENCOUNTER (OUTPATIENT)
Dept: ULTRASOUND IMAGING | Facility: CLINIC | Age: 43
Discharge: HOME | End: 2018-08-28
Attending: PHYSICIAN ASSISTANT | Admitting: PHYSICIAN ASSISTANT
Payer: COMMERCIAL

## 2018-08-28 DIAGNOSIS — N63.10 LUMP OF RIGHT BREAST: ICD-10-CM

## 2018-08-28 PROCEDURE — 19083 BX BREAST 1ST LESION US IMAG: CPT | Mod: RT

## 2018-08-28 PROCEDURE — 88305 TISSUE EXAM BY PATHOLOGIST: CPT | Mod: 26 | Performed by: PHYSICIAN ASSISTANT

## 2018-08-28 PROCEDURE — 88305 TISSUE EXAM BY PATHOLOGIST: CPT | Performed by: PHYSICIAN ASSISTANT

## 2018-08-29 ENCOUNTER — TELEPHONE (OUTPATIENT)
Dept: MAMMOGRAPHY | Facility: CLINIC | Age: 43
End: 2018-08-29

## 2018-08-29 LAB — COPATH REPORT: NORMAL

## 2018-08-29 NOTE — TELEPHONE ENCOUNTER
Pathology report reviewed with breast radiologist Ana. 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   - No microcalcifications identified.   - Negative for malignancy. Recommended follow up is surgical consult. Consult has been arranged for patient.  Patient agrees to see Dr Fernandez on 8-30-18 at 1400 arriving at 1330  Patient states no problems with biopsy site. Questions were answered and my phone number given if she has further questions or concerns.

## 2018-08-30 ENCOUNTER — OFFICE VISIT (OUTPATIENT)
Dept: SURGERY | Facility: CLINIC | Age: 43
End: 2018-08-30
Payer: COMMERCIAL

## 2018-08-30 ENCOUNTER — TELEPHONE (OUTPATIENT)
Dept: SURGERY | Facility: CLINIC | Age: 43
End: 2018-08-30

## 2018-08-30 VITALS
RESPIRATION RATE: 16 BRPM | OXYGEN SATURATION: 100 % | DIASTOLIC BLOOD PRESSURE: 80 MMHG | WEIGHT: 194 LBS | HEIGHT: 66 IN | SYSTOLIC BLOOD PRESSURE: 138 MMHG | BODY MASS INDEX: 31.18 KG/M2 | HEART RATE: 70 BPM

## 2018-08-30 DIAGNOSIS — N63.10 BREAST MASS, RIGHT: Primary | ICD-10-CM

## 2018-08-30 PROCEDURE — 99203 OFFICE O/P NEW LOW 30 MIN: CPT | Performed by: SURGERY

## 2018-08-30 NOTE — PROGRESS NOTES
Breast Patients    BREAST PATIENTS (ALL)    1-Do you have any of the following symptoms? Breast Pain  2-In which breast are you having the symptoms? right  3-Do you use hormones?  No  4-Have you had a Mammogram? Yes  Where: [facility]  Date: 8/28/2018  5-Have you ever had a breast cyst drained? No  6-Have you ever had a breast biopsy? Yes  Side: [facility]  Date: 8/28/2018  7-Have you ever had a Breast Cancer? No   8-Is there a history of Breast Cancer in your family? No  9-Have you ever had Ovarian Cancer? No  10-Is there a history of Ovarian Cancer in your family? No  11-Summarize your caffeine intake (i.e. coffee, tea, chocolate, soda etc.): N/A    BREAST PATIENTS (FEMALE)    12-What age did your periods begin? 13  13-Date your last menstrual period began? Can't remember   14-Number of full-term pregnancies: 3  15-Your age when your first child was born? 27  16-Did you nurse your children? Yes  17-Are you pregnant now? No  18-Have you begun menopause? Yes  Age Menopause began:  37  19-Have you had either ovary removed?No  20-Do you have breast implants? No

## 2018-08-30 NOTE — MR AVS SNAPSHOT
After Visit Summary   8/30/2018    Marlen Solano    MRN: [id number]           Patient Information     Date Of Birth          1975        Visit Information        Provider Department      8/30/2018 2:00 PM Hosea Fernandez MD [facility] General Surgery      Today's Diagnoses     Breast mass, right    -  1       Follow-ups after your visit        Your next 10 appointments already scheduled     Oct 19, 2018 10:30 AM CDT   (Arrive by 10:15 AM)   US BREAST RADIOACTIVE SEED LOCALIZATION RIGHT with [email or web address], RH BREAST RAD, RH BREAST NURSE   [facility] Breast Ultrasound ([facility])    [address], [address]  [facility] [address] [phone]           Please bring a list of your medicines (including vitamins, minerals and over-the-counter drugs). Also, tell your doctor about any allergies you may have. Wear comfortable clothes and leave your valuables at home.  You do not need to do anything special to prepare for your exam.  Please call the Imaging Department at your exam site with any questions.            Oct 19, 2018 10:30 AM CDT   MA RADIOACTIVE SEED LOCALIZATION RIGHT with [email or web address], RH BREAST NURSE, RH BREAST RAD   [facility] ([facility])    [address]  [facility] [address] [phone]           Do not use any powder, lotion or deodorant under your arms or on your breast. If you do, we will ask you to remove it before your exam.  Wear comfortable clothing and a well-fitted bra to the clinic. (A sports bra is best.) Bring a list of your medicines, including vitamins, minerals and over-the-counter drugs. It is best to leave valuables at home.  Tell your care team if:   You have any allergies.   You are taking aspirin, ibuprofen, naproxen or any drug that could increase bleeding.  Bring any previous mammograms from other facilities or have them mailed to the breast  center.    Stop taking Coumadin (warfarin) 5 days before treatment. Restart the day after treatment.   If you take Plavix, Ticlid, Pletal or Persantine, ask your doctor if you should stop these medicines. You may need extra tests on the morning of your scan.            Oct 19, 2018   Procedure with Hosea Fernandez MD   [facility] PeriOp Services (--)    [address]  [facility] [address]   [phone]            Oct 19, 2018 12:30 PM CDT   [facility] Same Day Surgery with Hosea Fernandez MD, Corie Rogers PA-C   [facility] ([facility])    [facility] ([facility])   [phone]            Oct 19, 2018  2:00 PM CDT   MA POST PROCEDURE RIGHT with RHBCMA2   [facility] ([facility])    [address]  [facility] [address]   [phone]           Do not use any powder, lotion or deodorant under your arms or on your breast. If you do, we will ask you to remove it before your exam.  Wear comfortable, two-piece clothing.  If you have any allergies, tell your care team.  Bring any previous mammograms from other facilities or have them mailed to the breast center.              Future tests that were ordered for you today     Open Future Orders        Priority Expected Expires Ordered    US Breast Radioactive Seed Placement, 1St Lesion Right Routine 8/30/2018 8/30/2019 8/30/2018    MA Radioactive Seed Placement, 1St Lesion Right Routine 8/30/2018 8/30/2019 8/30/2018    MA Post Procedure Right Routine 8/30/2018 8/30/2019 8/30/2018            Who to contact     If you have questions or need follow up information about today's clinic visit or your schedule please contact [facility] directly at [phone].  Normal or non-critical lab and imaging results will be communicated to you by MyChart, letter or phone within 4 business days after the clinic  "has received the results. If you do not hear from us within 7 days, please contact the clinic through [place] or phone. If you have a critical or abnormal lab result, we will notify you by phone as soon as possible.  Submit refill requests through [employer] or call your pharmacy and they will forward the refill request to us. Please allow 3 business days for your refill to be completed.          Additional Information About Your Visit        Care EveryWhere ID     This is your Care EveryWhere ID. This could be used by other organizations to access your [place] medical records  [id number]        Your Vitals Were     Pulse Respirations Height Pulse Oximetry BMI (Body Mass Index)       70 16 5' 6\" (1.676 m) 100% 31.31 kg/m2        Blood Pressure from Last 3 Encounters:   08/30/18 138/80   08/23/18 128/78   07/05/18 114/68    Weight from Last 3 Encounters:   08/30/18 194 lb (88 kg)   08/23/18 197 lb 9.6 oz (89.6 kg)   07/05/18 194 lb 12.8 oz (88.4 kg)              Today, you had the following     No orders found for display       Primary Care Provider Office Phone # Fax #    Mary Ellen White [phone]       [address]  [facility] [address]        Equal Access to Services     ESTRELLA RUSH : Hadii mateusz ku hadasho Soomaali, waaxda luqadaha, qaybta kaalmada adeegyada, angel patelin hayshawnn tino martin . So [facility] [phone].    ATENCIÓN: Si habla español, tiene a hill disposición servicios gratuitos de asistencia lingüística. lashanda al [phone].    We comply with applicable federal civil rights laws and [place] laws. We do not discriminate on the basis of race, color, national origin, age, disability, sex, sexual orientation, or gender identity.            Thank you!     Thank you for choosing [facility]  for your care. Our goal is always to provide you with excellent care. Hearing back from our patients is one way we can continue to improve our services. Please take " a few minutes to complete the written survey that you may receive in the mail after your visit with us. Thank you!             Your Updated Medication List - Protect others around you: Learn how to safely use, store and throw away your medicines at www.disposemymeds.org.      Notice  As of 8/30/2018  2:52 PM    You have not been prescribed any medications.

## 2018-08-30 NOTE — TELEPHONE ENCOUNTER
Type of surgery: RIGHT BREAST SEED LOCALIZED BIOPSY, EXCISION LEFT BREAST SKIN LESION   Location of surgery: [facility]  Date and time of surgery: 10/19/2018 @ 12:30 PM   Surgeon: RENEE SANCHEZ MD    Pre-Op Appt Date: PATIENT TO SCHEDULE    Post-Op Appt Date: PATIENT TO SCHEDULE     Packet sent out: PACKET AND SOAP GIVEN TO PATIENT   Pre-cert/Authorization completed:  Not Applicable  Date: 8/30/2018     RIGHT BREAST SEED LOCALIZED BIOPSY, EXCISION LEFT BREAST SKIN LESION    GENERAL PT INST TO HAVE H&P WITH DR ROMEO 90 MIN REQ PA ASSIST DJM NMS   SEED AT 10:30 NMS

## 2018-08-30 NOTE — LETTER
"2018    RE: Marlen Solnao, : 1975      HPI:  left breast pain noted, intermittent; upper outer quadrant.        Skin rashes, dimpling or nipple changes:  left  Nipple discharge:   none  Does not perform self breast exams.  Previous breast biopsies: Yes - biopsy marker in nodule left and now biopsy right retro-areolar   Previous cyst aspiration: No  Previous other breast surgery: No    Family history of breast cancer: No  Family history of colon cancer: No  Family history of pancreatic cancer: No  Family history of prostate cancer: No     Mammography reveals: a nodule upper medial left with a biopsy marker and nodule in the right lateral retro-areolar region     US reveals: a right breast nodule measuring 2.5 cm at the lateral retro-areolar region  Also shin thickening in the left UOQ corresponding to a painful \"skin based lesion\"     Percutaneous core needle biopsy reveals: fibroepithelial lesion with focal ALH      Past Medical History:  Has a past medical history of NO ACTIVE PROBLEMS.    PE:  Vitals: There were no vitals taken for this visit.  General appearance: well-nourished, sitting comfortably, no apparent distress  Neck: Supple without thyromegaly, lymphadenopathy, masses  Lungs: Respirations unlabored  Abdomen: soft, nondistended  Extremities: Without edema  Neurologic: nonfocal, grossly intact times four extremities, alert and oriented times three  Psychiatric: Mood and affect are appropriate  Skin: Without lesions or rashes  Breast:                          Masses-left breast, upper outer quadrant, large pigmented skin lesion measuring about 5 cm in diameter.  She reports that this is tender intermittently and he wishes to have it excised.  No other palpable masses in the breast on the right or left however she does have keloids from prior minor surgeries on her chest including a previous core biopsy in [place] on the left breast which developed a keloid at the core biopsy " site.                        Ecchymosis- none                        Incisional scar- none     Axillae:   Palpable adenopathy: none     Assessment: Right breast mass, lateral subareolar  -fibroepithelial with ALH                         Left painful skin lesion UOQ     PLAN:  Discussed options.  She requests removal of the right breast mass to confirm benign pathology.  She understands there is a possibility that this could be a malignancy and require additional surgery.  Additionally, she requests that the painful lesion in the left breast be excised.  This is a dermal-based lesion.  She understands that significant keloid formation may follow its excision as she has shown a significant tendency to keloid formation from other minor procedures.  She seems understand both procedures quite well and is quite comfortable with the plan above.     Hosea Fernandez MD

## 2018-08-30 NOTE — PROGRESS NOTES
"HPI:  left breast pain noted, intermittent; upper outer quadrant.       Skin rashes, dimpling or nipple changes:  left  Nipple discharge:   none  Does not perform self breast exams.  Previous breast biopsies: Yes - biopsy marker in nodule left and now biopsy right retro-areolar   Previous cyst aspiration: No  Previous other breast surgery: No    Family History   Problem Relation Age of Onset     Family History Negative Mother      Cerebrovascular Disease Father      Hypertension Father      Hypertension Brother      Diabetes Paternal Grandfather      Family history of breast cancer: No  Family history of colon cancer: No  Family history of pancreatic cancer: No  Family history of prostate cancer: No    Mammography reveals: a nodule upper medial left with a biopsy marker and nodule in the right lateral retro-areolar region    US reveals: a right breast nodule measuring 2.5 cm at the lateral retro-areolar region  Also shin thickening in the left UOQ corresponding to a painful \"skin based lesion\"    Percutaneous core needle biopsy reveals: fibroepithelial lesion with focal ALH     Past Medical History:   has a past medical history of NO ACTIVE PROBLEMS.    Past Surgical History:  Past Surgical History:   Procedure Laterality Date     ABDOMEN SURGERY  2004         ABDOMEN SURGERY  2008         ABDOMEN SURGERY  2010            Social History:  Social History     Social History     Marital status:      Spouse name: N/A     Number of children: N/A     Years of education: N/A     Occupational History     Not on file.     Social History Main Topics     Smoking status: Never Smoker     Smokeless tobacco: Never Used     Alcohol use No     Drug use: No     Sexual activity: Yes     Partners: Male     Birth control/ protection: Post-menopausal     Other Topics Concern     Not on file     Social History Narrative       PE:  Vitals: There were no vitals taken for this visit.  General appearance: " well-nourished, sitting comfortably, no apparent distress  Neck: Supple without thyromegaly, lymphadenopathy, masses  Lungs: Respirations unlabored  Abdomen: soft, nondistended  Extremities: Without edema  Neurologic: nonfocal, grossly intact times four extremities, alert and oriented times three  Psychiatric: Mood and affect are appropriate  Skin: Without lesions or rashes  Breast:     Masses-left breast, upper outer quadrant, large pigmented skin lesion measuring about 5 cm in diameter.  She reports that this is tender intermittently and he wishes to have it excised.  No other palpable masses in the breast on the right or left however she does have keloids from prior minor surgeries on her chest including a previous core biopsy in [place] on the left breast which developed a keloid at the core biopsy site.   Ecchymosis- none   Incisional scar- none    Axillae:   Palpable adenopathy: none    Assessment: Right breast mass, lateral subareolar  -fibroepithelial with ALH                         Left painful skin lesion UOQ    PLAN:  Discussed options.  She requests removal of the right breast mass to confirm benign pathology.  She understands there is a possibility that this could be a malignancy and require additional surgery.  Additionally, she requests that the painful lesion in the left breast be excised.  This is a dermal-based lesion.  She understands that significant keloid formation may follow its excision as she has shown a significant tendency to keloid formation from other minor procedures.  She seems understand both procedures quite well and is quite comfortable with the plan above.    Hosea Fernandez MD    Please route or send letter to:  Primary Care Provider (PCP) and Include Progress Note

## 2018-10-11 ENCOUNTER — OFFICE VISIT (OUTPATIENT)
Dept: INTERNAL MEDICINE | Facility: CLINIC | Age: 43
End: 2018-10-11
Payer: COMMERCIAL

## 2018-10-11 VITALS
TEMPERATURE: 98.2 F | BODY MASS INDEX: 31.72 KG/M2 | WEIGHT: 197.4 LBS | HEIGHT: 66 IN | RESPIRATION RATE: 16 BRPM | HEART RATE: 87 BPM | DIASTOLIC BLOOD PRESSURE: 70 MMHG | OXYGEN SATURATION: 95 % | SYSTOLIC BLOOD PRESSURE: 116 MMHG

## 2018-10-11 DIAGNOSIS — N63.10 LUMP OF RIGHT BREAST: ICD-10-CM

## 2018-10-11 DIAGNOSIS — Z01.818 PREOP GENERAL PHYSICAL EXAM: Primary | ICD-10-CM

## 2018-10-11 LAB
ERYTHROCYTE [DISTWIDTH] IN BLOOD BY AUTOMATED COUNT: 14.7 % (ref 10–15)
HCT VFR BLD AUTO: 39.9 % (ref 35–47)
HGB BLD-MCNC: 14.2 G/DL (ref 11.7–15.7)
MCH RBC QN AUTO: 28.4 PG (ref 26.5–33)
MCHC RBC AUTO-ENTMCNC: 35.6 G/DL (ref 31.5–36.5)
MCV RBC AUTO: 80 FL (ref 78–100)
PLATELET # BLD AUTO: 297 10E9/L (ref 150–450)
RBC # BLD AUTO: 5 10E12/L (ref 3.8–5.2)
WBC # BLD AUTO: 4.8 10E9/L (ref 4–11)

## 2018-10-11 PROCEDURE — 80048 BASIC METABOLIC PNL TOTAL CA: CPT | Performed by: NURSE PRACTITIONER

## 2018-10-11 PROCEDURE — 36415 COLL VENOUS BLD VENIPUNCTURE: CPT | Performed by: NURSE PRACTITIONER

## 2018-10-11 PROCEDURE — 85027 COMPLETE CBC AUTOMATED: CPT | Performed by: NURSE PRACTITIONER

## 2018-10-11 PROCEDURE — 99214 OFFICE O/P EST MOD 30 MIN: CPT | Performed by: NURSE PRACTITIONER

## 2018-10-11 NOTE — LETTER
October 15, 2018      Marlen K Russ  [id number] [place] [address]        Dear ,    We are writing to inform you of your test results.    Your test results fall within the expected range(s) or remain unchanged from previous results.  Please continue with current treatment plan.    Resulted Orders   CBC with platelets   Result Value Ref Range    WBC 4.8 4.0 - 11.0 10e9/L    RBC Count 5.00 3.8 - 5.2 10e12/L    Hemoglobin 14.2 11.7 - 15.7 g/dL    Hematocrit 39.9 35.0 - 47.0 %    MCV 80 78 - 100 fl    MCH 28.4 26.5 - 33.0 pg    MCHC 35.6 31.5 - 36.5 g/dL    RDW 14.7 10.0 - 15.0 %    Platelet Count 297 150 - 450 10e9/L   Basic metabolic panel   Result Value Ref Range    Sodium 141 133 - 144 mmol/L    Potassium 3.4 3.4 - 5.3 mmol/L    Chloride 106 94 - 109 mmol/L    Carbon Dioxide 26 20 - 32 mmol/L    Anion Gap 9 3 - 14 mmol/L    Glucose 106 (H) 70 - 99 mg/dL      Comment:      Non Fasting    Urea Nitrogen 13 7 - 30 mg/dL    Creatinine 0.84 0.52 - 1.04 mg/dL    GFR Estimate 73 >60 mL/min/1.7m2      Comment:      Non  GFR Calc    GFR Estimate If Black 89 >60 mL/min/1.7m2      Comment:       GFR Calc    Calcium 8.7 8.5 - 10.1 mg/dL       If you have any questions or concerns, please call the clinic at the number listed above.       Sincerely,        Aure Clemente NP

## 2018-10-11 NOTE — MR AVS SNAPSHOT
After Visit Summary   10/11/2018    Marlen Solano    MRN: [id number]           Patient Information     Date Of Birth          1975        Visit Information        Provider Department      10/11/2018 3:40 PM Aure Clemente NP [facility]        Today's Diagnoses     Preop general physical exam    -  1    Lump of right breast          Care Instructions      Before Your Surgery      Call your surgeon if there is any change in your health. This includes signs of a cold or flu (such as a sore throat, runny nose, cough, rash or fever).    Do not smoke, drink alcohol or take over the counter medicine (unless your surgeon or primary care doctor tells you to) for the 24 hours before and after surgery.    If you take prescribed drugs: Follow your doctor s orders about which medicines to take and which to stop until after surgery.    Eating and drinking prior to surgery: follow the instructions from your surgeon    Take a shower or bath the night before surgery. Use the soap your surgeon gave you to gently clean your skin. If you do not have soap from your surgeon, use your regular soap. Do not shave or scrub the surgery site.  Wear clean pajamas and have clean sheets on your bed.           Follow-ups after your visit        Follow-up notes from your care team     Return if symptoms worsen or fail to improve.      Your next 10 appointments already scheduled     Oct 19, 2018 10:30 AM CDT   (Arrive by 10:15 AM)   US BREAST RADIOACTIVE SEED LOCALIZATION RIGHT with RHBCUS1, RH BREAST RAD, RH BREAST NURSE   [facility] Breast Ultrasound ([facility])    [address] [facility], [address]  [facility] [address] [phone]           How do I prepare for my exam? (Food and drink instructions) No Food and Drink Restrictions.  How do I prepare for my exam? (Other instructions) You do not need to do anything special to prepare for your exam.  What should I wear: Wear  comfortable clothes.  How long does the exam take: Most ultrasounds take 30 to 60 minutes.  What should I bring: Bring a list of your medicines, including vitamins, minerals and over-the-counter drugs. It is safest to leave personal items at home.  Do I need a :  No  is needed.  What do I need to tell my doctor: Tell your doctor about any allergies you may have.  What should I do after the exam: No restrictions, You may resume normal activities.  What is this test: An ultrasound uses sound waves to make pictures of the body. Sound waves do not cause pain. The only discomfort may be the pressure of the wand against your skin or full bladder.  Who should I call with questions: If you have any questions, please call the Imaging Department where you will have your exam. Directions, parking instructions, and other information is available on our website, [email or web address].            Oct 19, 2018 10:30 AM CDT   MA RADIOACTIVE SEED LOCALIZATION RIGHT with RHBCMAD1,  BREAST NURSE,  BREAST RAD   [facility] ([facility])    [address]  [facility] [id number] [phone]           How do I prepare for my exam? (Food and drink instructions) No Food and Drink Restrictions.  How do I prepare for my exam? (Other instructions) Do not use any powder, lotion or deodorant under your arms or on your breast. If you do, we will ask you to remove it before your exam. Stop taking Coumadin (warfarin) 5 days before treatment. Restart the day after treatment. If you take Plavix, Ticlid, Pletal or Persantine, ask your doctor if you should stop these medicines. You may need extra tests on the morning of your scan.  What should I wear: Wear comfortable clothing and a well-fitted bra to the clinic. (A sports bra is best.)  How long does the exam take: * If having Stereotactic-guided biopsy this exam will take about 2 hours. * If having Ultrasound-guided biopsy this exam  will take about one hour. * If having MRI-guided biopsy this exam will take about two hours.  What should I bring: Bring a list of your medicines, including vitamins, minerals and over-the-counter drugs. Bring any previous mammograms from other facilities or have them mailed to the breast center. It is best to leave valuables at home.  Do I need a :  No  is needed.  What do I need to tell my doctor: Tell your care team if: * You have any allergies. * You are taking aspirin, ibuprofen, naproxen or any drug that could increase bleeding.  What should I do after the exam: You may have some bleeding. We will put pressure on the biopsy site, followed by a bandage. You may also have an ice pack. Please wear a well-fitted bra for 24 hours for extra breast support. To reduce the risk of bleeding, take it easy for the rest of the day.  Do not do anything strenuous for 24 hours.  Your breast may feel tender, and you may have a bruise. Take Tylenol for pain relief if needed. Do not take aspirin for three days. If you have questions or concerns about your medicines, talk to your doctor. A small scar may form at the biopsy site.  What is this test: During this exam, a doctor uses imaging and a needle to take tissue samples from your breast. Lab experts then look at the samples under a microscope. A biopsy will tell us if the tissue is cancer or benign (not cancer). For 8 out of 10 patients, the biopsy shows no cancer.  Who should I call with questions: If you have any questions, please call the Imaging Department where you will have your exam. Directions, parking instructions, and other information is available on our website, [email or web address].            Oct 19, 2018   Procedure with Hosea Fernandez MD   [facility] PeriOp Services (--)    [address] [facility] [address]   [phone]            Oct 19, 2018 12:30 PM CDT   [facility] Same Day Surgery with Hosea Fernandez MD,  Corie Rogers PA-C   [facility] Surgery Scheduling ([facility])    [facility] Surgery Scheduling ([facility])   [phone]            Oct 19, 2018  2:00 PM CDT   MA POST PROCEDURE RIGHT with RHBCMA2   [facility] ([facility])    [address]  [facility] [address]   [phone]           How do I prepare for my exam? (Food and drink instructions) No Food and Drink Restrictions.  How do I prepare for my exam? (Other instructions) Do not use any powder, lotion or deodorant under your arms or on your breast. If you do, we will ask you to remove it before your exam.  What should I wear: Wear comfortable, two-piece clothing.  How long does the exam take: Most scans will take 15 minutes.  What should I bring: Bring any previous mammograms from other facilities or have them mailed to the breast center.  Do I need a :  No  is needed.  What do I need to tell my doctor: If you have any allergies, tell your care team.  What should I do after the exam: No restrictions, You may resume normal activities.  What is this test: This test is an x-ray of the breast to look for breast disease. The breast is pressed between two plates to flatten and spread the tissue. An X-ray is taken of the breast from different angles.  Who should I call with questions: If you have any questions, please call the Imaging Department where you will have your exam. Directions, parking instructions, and other information is available on our website, [email or web address].  Other information about my exam Three-dimensional (3D) mammograms are available at [place] locations in [place], [place], [facility], [facility], [place], and [place].  Health locations include [place] and [facility] in [place].  Benefits of 3D mammograms include: * Improved rate of cancer detection * Decreases your chance of  "having to go back for more tests, which means fewer: * \"False-positive\" results (This means that there is an abnormal area but it isn't cancer.) * Invasive testing procedures, such as a biopsy or surgery * Can provide clearer images of the breast if you have dense breast tissue.  *3D mammography is an optional exam that anyone can have with a 2D mammogram. It doesn't replace or take the place of a 2D mammogram. 2D mammograms remain an effective screening test for all women.  Not all insurance companies cover the cost of a 3D mammogram. Check with your insurance.              Who to contact     If you have questions or need follow up information about today's clinic visit or your schedule please contact [facility] directly at [phone].  Normal or non-critical lab and imaging results will be communicated to you by MyChart, letter or phone within 4 business days after the clinic has received the results. If you do not hear from us within 7 days, please contact the clinic through MyChart or phone. If you have a critical or abnormal lab result, we will notify you by phone as soon as possible.  Submit refill requests through [employer] or call your pharmacy and they will forward the refill request to us. Please allow 3 business days for your refill to be completed.          Additional Information About Your Visit        Care EveryWhere ID     This is your Care EveryWhere ID. This could be used by other organizations to access your [place] medical records  [id number]        Your Vitals Were     Pulse Temperature Respirations Height Pulse Oximetry BMI (Body Mass Index)    87 98.2  F (36.8  C) (Oral) 16 5' 6\" (1.676 m) 95% 31.86 kg/m2       Blood Pressure from Last 3 Encounters:   10/11/18 116/70   08/30/18 138/80   08/23/18 128/78    Weight from Last 3 Encounters:   10/11/18 197 lb 6.4 oz (89.5 kg)   08/30/18 194 lb (88 kg)   08/23/18 197 lb 9.6 oz (89.6 kg)              We Performed the Following     " Basic metabolic panel     CBC with platelets        Primary Care Provider Office Phone # Fax #    Mary Ellen White [phone]       [address]  [facility] [address]        Equal Access to Services     JEAN RUSH : Hadii mateusz ku hadjuan manuelo Soomaali, waaxda luqadaha, qaybta kaalmada adeegyada, angel chopran jose eduardogail gillespieleoncio shoemaker. So [facility] [phone].    ATENCIÓN: Si habla español, tiene a hill disposición servicios gratuitos de asistencia lingüística. Llame al [phone].    We comply with applicable federal civil rights laws and [place] laws. We do not discriminate on the basis of race, color, national origin, age, disability, sex, sexual orientation, or gender identity.            Thank you!     Thank you for choosing [facility]  for your care. Our goal is always to provide you with excellent care. Hearing back from our patients is one way we can continue to improve our services. Please take a few minutes to complete the written survey that you may receive in the mail after your visit with us. Thank you!             Your Updated Medication List - Protect others around you: Learn how to safely use, store and throw away your medicines at www.disposemymeds.org.      Notice  As of 10/11/2018  4:16 PM    You have not been prescribed any medications.

## 2018-10-11 NOTE — PROGRESS NOTES
[place] [address]  [facility] [address]  [phone]  Dept: [phone]    PRE-OP EVALUATION:  Today's date: 10/11/2018    Marlen Solano (: 1975) presents for pre-operative evaluation assessment as requested by Dr. Cho.  She requires evaluation and anesthesia risk assessment prior to undergoing surgery/procedure for treatment of R breast lumpectomy .    Fax number for surgical facility: [facility]  Primary Physician: Mary Ellen White  Type of Anesthesia Anticipated: General    Patient has a Health Care Directive or Living Will:  NO    Preop Questions 10/11/2018   Date of Surgery/Procedure: [facility]   Facility or Hospital where procedure/surgery will be performed: [facility]   1.  Do you have a history of Heart attack, stroke, stent, coronary bypass surgery, or other heart surgery? No   2.  Do you ever have any pain or discomfort in your chest? No   3.  Do you have a history of  Heart Failure? No   4.   Are you troubled by shortness of breath when:  walking on a level surface, or up a slight hill, or at night? No   5.  Do you currently have a cold, bronchitis or other respiratory infection? No   6.  Do you have a cough, shortness of breath, or wheezing? No   7.  Do you sometimes get pains in the calves of your legs when you walk? No   8. Do you or anyone in your family have previous history of blood clots? No   9.  Do you or does anyone in your family have a serious bleeding problem such as prolonged bleeding following surgeries or cuts? No   10. Have you ever had problems with anemia or been told to take iron pills? No   11. Have you had any abnormal blood loss such as black, tarry or bloody stools, or abnormal vaginal bleeding? No   12. Have you ever had a blood transfusion? No   13. Have you or any of your relatives ever had problems with anesthesia? No   14. Do you have sleep apnea, excessive snoring or daytime drowsiness? No   15. Do you have any prosthetic  heart valves? No   16. Do you have prosthetic joints? No   17. Is there any chance that you may be pregnant? No         HPI:     HPI related to upcoming procedure: R breast lumpectomy      See problem list for active medical problems.  Problems all longstanding and stable, except as noted/documented.  See ROS for pertinent symptoms related to these conditions.                                                                                                                                                          .    MEDICAL HISTORY:     Patient Active Problem List    Diagnosis Date Noted     CARDIOVASCULAR SCREENING; LDL GOAL LESS THAN 130 2016     Priority: Medium      Past Medical History:   Diagnosis Date     NO ACTIVE PROBLEMS      Past Surgical History:   Procedure Laterality Date     ABDOMEN SURGERY           ABDOMEN SURGERY           ABDOMEN SURGERY           No current outpatient prescriptions on file.     OTC products: None, except as noted above    No Known Allergies   Latex Allergy: NO    Social History   Substance Use Topics     Smoking status: Never Smoker     Smokeless tobacco: Never Used     Alcohol use No     History   Drug Use No       REVIEW OF SYSTEMS:   CONSTITUTIONAL: NEGATIVE for fever, chills, change in weight  ENT/MOUTH: NEGATIVE for ear, mouth and throat problems  RESP: NEGATIVE for significant cough or SOB  CV: NEGATIVE for chest pain, palpitations or peripheral edema  GI: NEGATIVE for nausea, abdominal pain, heartburn, or change in bowel habits  : NEGATIVE for frequency, dysuria, or hematuria  MUSCULOSKELETAL: NEGATIVE for significant arthralgias or myalgia  NEURO: NEGATIVE for weakness, dizziness or paresthesias  ENDOCRINE: NEGATIVE for temperature intolerance, skin/hair changes  HEME: NEGATIVE for bleeding problems    EXAM:   /70 (BP Location: Right arm, Patient Position: Sitting, Cuff Size: Adult Large)  Pulse 87  Temp 98.2  F (36.8  " C) (Oral)  Resp 16  Ht 5' 6\" (1.676 m)  Wt 197 lb 6.4 oz (89.5 kg)  SpO2 95%  BMI 31.86 kg/m2    GENERAL APPEARANCE: healthy, alert and no distress     NECK: no adenopathy, no asymmetry, masses, or scars and thyroid normal to palpation     RESP: lungs clear to auscultation - no rales, rhonchi or wheezes     CV: regular rates and rhythm, normal S1 S2, no S3 or S4 and no murmur, click or rub     ABDOMEN:  soft, nontender, no HSM or masses and bowel sounds normal     MS: extremities normal- no gross deformities noted, no evidence of inflammation in joints, FROM in all extremities.     NEURO: Normal strength and tone, sensory exam grossly normal, mentation intact and speech normal     PSYCH: mentation appears normal. and affect normal/bright    DIAGNOSTICS:   EKG: Not indicated due to non-vascular surgery and low risk of event (age <65 and without cardiac risk factors)    Recent Labs   Lab Test  07/02/18   0818  10/02/17   0816  06/22/16   0923   HGB   --   14.4  14.5   PLT   --   264  251   NA  140  142  142   POTASSIUM  3.9  4.0  3.5   CR  0.90  1.06*  0.86        IMPRESSION:   Reason for surgery/procedure: R breast lumpectomy    The proposed surgical procedure is considered LOW risk.    REVISED CARDIAC RISK INDEX  The patient has the following serious cardiovascular risks for perioperative complications such as (MI, PE, VFib and 3  AV Block):  No serious cardiac risks  INTERPRETATION: 0 risks: Class I (very low risk - 0.4% complication rate)    The patient has the following additional risks for perioperative complications:  No identified additional risks      ICD-10-CM    1. Preop general physical exam Z01.818      (Z01.818) Preop general physical exam  (primary encounter diagnosis)  Comment:   Plan: CBC with platelets, Basic metabolic panel              RECOMMENDATIONS:     --Consult hospital rounder / IM to assist post-op medical management    --Patient is to take all scheduled medications on the day of " surgery EXCEPT for modifications listed below.    APPROVAL GIVEN to proceed with proposed procedure, without further diagnostic evaluation       Signed Electronically by: Aure Clemente NP    Copy of this evaluation report is provided to requesting physician.    [place] Preop Guidelines    Revised Cardiac Risk Index

## 2018-10-12 LAB
ANION GAP SERPL CALCULATED.3IONS-SCNC: 9 MMOL/L (ref 3–14)
BUN SERPL-MCNC: 13 MG/DL (ref 7–30)
CALCIUM SERPL-MCNC: 8.7 MG/DL (ref 8.5–10.1)
CHLORIDE SERPL-SCNC: 106 MMOL/L (ref 94–109)
CO2 SERPL-SCNC: 26 MMOL/L (ref 20–32)
CREAT SERPL-MCNC: 0.84 MG/DL (ref 0.52–1.04)
GFR SERPL CREATININE-BSD FRML MDRD: 73 ML/MIN/1.7M2
GLUCOSE SERPL-MCNC: 106 MG/DL (ref 70–99)
POTASSIUM SERPL-SCNC: 3.4 MMOL/L (ref 3.4–5.3)
SODIUM SERPL-SCNC: 141 MMOL/L (ref 133–144)

## 2018-10-16 ENCOUNTER — TELEPHONE (OUTPATIENT)
Dept: MAMMOGRAPHY | Facility: CLINIC | Age: 43
End: 2018-10-16

## 2018-10-18 NOTE — H&P (VIEW-ONLY)
[place] [address]  [facility] [address]  [phone]  Dept: [phone]    PRE-OP EVALUATION:  Today's date: 10/11/2018    Marlen Solano (: 1975) presents for pre-operative evaluation assessment as requested by Dr. Cho.  She requires evaluation and anesthesia risk assessment prior to undergoing surgery/procedure for treatment of R breast lumpectomy .    Fax number for surgical facility: [facility]  Primary Physician: Mary Ellen White  Type of Anesthesia Anticipated: General    Patient has a Health Care Directive or Living Will:  NO    Preop Questions 10/11/2018   Date of Surgery/Procedure: [facility]   Facility or Hospital where procedure/surgery will be performed: [facility]   1.  Do you have a history of Heart attack, stroke, stent, coronary bypass surgery, or other heart surgery? No   2.  Do you ever have any pain or discomfort in your chest? No   3.  Do you have a history of  Heart Failure? No   4.   Are you troubled by shortness of breath when:  walking on a level surface, or up a slight hill, or at night? No   5.  Do you currently have a cold, bronchitis or other respiratory infection? No   6.  Do you have a cough, shortness of breath, or wheezing? No   7.  Do you sometimes get pains in the calves of your legs when you walk? No   8. Do you or anyone in your family have previous history of blood clots? No   9.  Do you or does anyone in your family have a serious bleeding problem such as prolonged bleeding following surgeries or cuts? No   10. Have you ever had problems with anemia or been told to take iron pills? No   11. Have you had any abnormal blood loss such as black, tarry or bloody stools, or abnormal vaginal bleeding? No   12. Have you ever had a blood transfusion? No   13. Have you or any of your relatives ever had problems with anesthesia? No   14. Do you have sleep apnea, excessive snoring or daytime drowsiness? No   15. Do you have any prosthetic  heart valves? No   16. Do you have prosthetic joints? No   17. Is there any chance that you may be pregnant? No         HPI:     HPI related to upcoming procedure: R breast lumpectomy      See problem list for active medical problems.  Problems all longstanding and stable, except as noted/documented.  See ROS for pertinent symptoms related to these conditions.                                                                                                                                                          .    MEDICAL HISTORY:     Patient Active Problem List    Diagnosis Date Noted     CARDIOVASCULAR SCREENING; LDL GOAL LESS THAN 130 2016     Priority: Medium      Past Medical History:   Diagnosis Date     NO ACTIVE PROBLEMS      Past Surgical History:   Procedure Laterality Date     ABDOMEN SURGERY           ABDOMEN SURGERY           ABDOMEN SURGERY           No current outpatient prescriptions on file.     OTC products: None, except as noted above    No Known Allergies   Latex Allergy: NO    Social History   Substance Use Topics     Smoking status: Never Smoker     Smokeless tobacco: Never Used     Alcohol use No     History   Drug Use No       REVIEW OF SYSTEMS:   CONSTITUTIONAL: NEGATIVE for fever, chills, change in weight  ENT/MOUTH: NEGATIVE for ear, mouth and throat problems  RESP: NEGATIVE for significant cough or SOB  CV: NEGATIVE for chest pain, palpitations or peripheral edema  GI: NEGATIVE for nausea, abdominal pain, heartburn, or change in bowel habits  : NEGATIVE for frequency, dysuria, or hematuria  MUSCULOSKELETAL: NEGATIVE for significant arthralgias or myalgia  NEURO: NEGATIVE for weakness, dizziness or paresthesias  ENDOCRINE: NEGATIVE for temperature intolerance, skin/hair changes  HEME: NEGATIVE for bleeding problems    EXAM:   /70 (BP Location: Right arm, Patient Position: Sitting, Cuff Size: Adult Large)  Pulse 87  Temp 98.2  F (36.8  " C) (Oral)  Resp 16  Ht 5' 6\" (1.676 m)  Wt 197 lb 6.4 oz (89.5 kg)  SpO2 95%  BMI 31.86 kg/m2    GENERAL APPEARANCE: healthy, alert and no distress     NECK: no adenopathy, no asymmetry, masses, or scars and thyroid normal to palpation     RESP: lungs clear to auscultation - no rales, rhonchi or wheezes     CV: regular rates and rhythm, normal S1 S2, no S3 or S4 and no murmur, click or rub     ABDOMEN:  soft, nontender, no HSM or masses and bowel sounds normal     MS: extremities normal- no gross deformities noted, no evidence of inflammation in joints, FROM in all extremities.     NEURO: Normal strength and tone, sensory exam grossly normal, mentation intact and speech normal     PSYCH: mentation appears normal. and affect normal/bright    DIAGNOSTICS:   EKG: Not indicated due to non-vascular surgery and low risk of event (age <65 and without cardiac risk factors)    Recent Labs   Lab Test  07/02/18   0818  10/02/17   0816  06/22/16   0923   HGB   --   14.4  14.5   PLT   --   264  251   NA  140  142  142   POTASSIUM  3.9  4.0  3.5   CR  0.90  1.06*  0.86        IMPRESSION:   Reason for surgery/procedure: R breast lumpectomy    The proposed surgical procedure is considered LOW risk.    REVISED CARDIAC RISK INDEX  The patient has the following serious cardiovascular risks for perioperative complications such as (MI, PE, VFib and 3  AV Block):  No serious cardiac risks  INTERPRETATION: 0 risks: Class I (very low risk - 0.4% complication rate)    The patient has the following additional risks for perioperative complications:  No identified additional risks      ICD-10-CM    1. Preop general physical exam Z01.818      (Z01.818) Preop general physical exam  (primary encounter diagnosis)  Comment:   Plan: CBC with platelets, Basic metabolic panel              RECOMMENDATIONS:     --Consult hospital rounder / IM to assist post-op medical management    --Patient is to take all scheduled medications on the day of " surgery EXCEPT for modifications listed below.    APPROVAL GIVEN to proceed with proposed procedure, without further diagnostic evaluation       Signed Electronically by: Aure Clemente NP    Copy of this evaluation report is provided to requesting physician.    [place] Preop Guidelines    Revised Cardiac Risk Index

## 2018-10-19 ENCOUNTER — SURGERY (OUTPATIENT)
Age: 43
End: 2018-10-19

## 2018-10-19 ENCOUNTER — HOSPITAL ENCOUNTER (OUTPATIENT)
Facility: CLINIC | Age: 43
Discharge: HOME | End: 2018-10-19
Attending: SURGERY | Admitting: SURGERY
Payer: COMMERCIAL

## 2018-10-19 ENCOUNTER — HOSPITAL ENCOUNTER (OUTPATIENT)
Dept: ULTRASOUND IMAGING | Facility: CLINIC | Age: 43
End: 2018-10-19
Attending: SURGERY | Admitting: SURGERY
Payer: COMMERCIAL

## 2018-10-19 ENCOUNTER — APPOINTMENT (OUTPATIENT)
Dept: SURGERY | Facility: PHYSICIAN GROUP | Age: 43
End: 2018-10-19
Payer: COMMERCIAL

## 2018-10-19 ENCOUNTER — ANESTHESIA (OUTPATIENT)
Dept: SURGERY | Facility: CLINIC | Age: 43
End: 2018-10-19
Payer: COMMERCIAL

## 2018-10-19 ENCOUNTER — ANESTHESIA EVENT (OUTPATIENT)
Dept: SURGERY | Facility: CLINIC | Age: 43
End: 2018-10-19
Payer: COMMERCIAL

## 2018-10-19 ENCOUNTER — APPOINTMENT (OUTPATIENT)
Dept: MAMMOGRAPHY | Facility: CLINIC | Age: 43
End: 2018-10-19
Attending: SURGERY
Payer: COMMERCIAL

## 2018-10-19 ENCOUNTER — HOSPITAL ENCOUNTER (OUTPATIENT)
Dept: MAMMOGRAPHY | Facility: CLINIC | Age: 43
End: 2018-10-19
Attending: SURGERY | Admitting: SURGERY
Payer: COMMERCIAL

## 2018-10-19 VITALS
RESPIRATION RATE: 12 BRPM | DIASTOLIC BLOOD PRESSURE: 94 MMHG | WEIGHT: 193.4 LBS | OXYGEN SATURATION: 96 % | HEIGHT: 64 IN | SYSTOLIC BLOOD PRESSURE: 146 MMHG | BODY MASS INDEX: 33.02 KG/M2 | TEMPERATURE: 96.8 F

## 2018-10-19 DIAGNOSIS — N63.10 BREAST MASS, RIGHT: ICD-10-CM

## 2018-10-19 DIAGNOSIS — N63.10 LUMP OF RIGHT BREAST: Primary | ICD-10-CM

## 2018-10-19 PROCEDURE — 40000986 MA POST PROCEDURE RIGHT

## 2018-10-19 PROCEDURE — 71000012 ZZH RECOVERY PHASE 1 LEVEL 1 FIRST HR: Performed by: SURGERY

## 2018-10-19 PROCEDURE — 36000060 ZZH SURGERY LEVEL 3 W FLUORO 1ST 30 MIN: Performed by: SURGERY

## 2018-10-19 PROCEDURE — 37000009 ZZH ANESTHESIA TECHNICAL FEE, EACH ADDTL 15 MIN: Performed by: SURGERY

## 2018-10-19 PROCEDURE — 40000268 MA BREAST SPECIMEN RIGHT OR

## 2018-10-19 PROCEDURE — 25000125 ZZHC RX 250: Performed by: SURGERY

## 2018-10-19 PROCEDURE — 27210794 ZZH OR GENERAL SUPPLY STERILE: Performed by: SURGERY

## 2018-10-19 PROCEDURE — 25000125 ZZHC RX 250: Performed by: NURSE ANESTHETIST, CERTIFIED REGISTERED

## 2018-10-19 PROCEDURE — 19125 EXCISION BREAST LESION: CPT | Mod: RT | Performed by: SURGERY

## 2018-10-19 PROCEDURE — 88305 TISSUE EXAM BY PATHOLOGIST: CPT | Mod: 26 | Performed by: SURGERY

## 2018-10-19 PROCEDURE — 71000027 ZZH RECOVERY PHASE 2 EACH 15 MINS: Performed by: SURGERY

## 2018-10-19 PROCEDURE — 25000128 H RX IP 250 OP 636: Performed by: SURGERY

## 2018-10-19 PROCEDURE — 37000008 ZZH ANESTHESIA TECHNICAL FEE, 1ST 30 MIN: Performed by: SURGERY

## 2018-10-19 PROCEDURE — 11406 EXC TR-EXT B9+MARG >4.0 CM: CPT | Mod: 51 | Performed by: SURGERY

## 2018-10-19 PROCEDURE — 88307 TISSUE EXAM BY PATHOLOGIST: CPT | Mod: 26 | Performed by: SURGERY

## 2018-10-19 PROCEDURE — 25000566 ZZH SEVOFLURANE, EA 15 MIN: Performed by: SURGERY

## 2018-10-19 PROCEDURE — 36000058 ZZH SURGERY LEVEL 3 EA 15 ADDTL MIN: Performed by: SURGERY

## 2018-10-19 PROCEDURE — 88307 TISSUE EXAM BY PATHOLOGIST: CPT | Performed by: SURGERY

## 2018-10-19 PROCEDURE — 25000128 H RX IP 250 OP 636: Performed by: NURSE ANESTHETIST, CERTIFIED REGISTERED

## 2018-10-19 PROCEDURE — 71000013 ZZH RECOVERY PHASE 1 LEVEL 1 EA ADDTL HR: Performed by: SURGERY

## 2018-10-19 PROCEDURE — 40000306 ZZH STATISTIC PRE PROC ASSESS II: Performed by: SURGERY

## 2018-10-19 PROCEDURE — 25000128 H RX IP 250 OP 636: Performed by: ANESTHESIOLOGY

## 2018-10-19 PROCEDURE — 88305 TISSUE EXAM BY PATHOLOGIST: CPT | Performed by: SURGERY

## 2018-10-19 PROCEDURE — A4641 RADIOPHARM DX AGENT NOC: HCPCS

## 2018-10-19 RX ORDER — LIDOCAINE 40 MG/G
CREAM TOPICAL
Status: DISCONTINUED | OUTPATIENT
Start: 2018-10-19 | End: 2018-10-19 | Stop reason: HOSPADM

## 2018-10-19 RX ORDER — NALOXONE HYDROCHLORIDE 0.4 MG/ML
.1-.4 INJECTION, SOLUTION INTRAMUSCULAR; INTRAVENOUS; SUBCUTANEOUS
Status: DISCONTINUED | OUTPATIENT
Start: 2018-10-19 | End: 2018-10-19 | Stop reason: HOSPADM

## 2018-10-19 RX ORDER — SODIUM CHLORIDE, SODIUM LACTATE, POTASSIUM CHLORIDE, CALCIUM CHLORIDE 600; 310; 30; 20 MG/100ML; MG/100ML; MG/100ML; MG/100ML
INJECTION, SOLUTION INTRAVENOUS CONTINUOUS
Status: DISCONTINUED | OUTPATIENT
Start: 2018-10-19 | End: 2018-10-19 | Stop reason: HOSPADM

## 2018-10-19 RX ORDER — LIDOCAINE HYDROCHLORIDE 10 MG/ML
INJECTION, SOLUTION INFILTRATION; PERINEURAL PRN
Status: DISCONTINUED | OUTPATIENT
Start: 2018-10-19 | End: 2018-10-19

## 2018-10-19 RX ORDER — DEXAMETHASONE SODIUM PHOSPHATE 4 MG/ML
INJECTION, SOLUTION INTRA-ARTICULAR; INTRALESIONAL; INTRAMUSCULAR; INTRAVENOUS; SOFT TISSUE PRN
Status: DISCONTINUED | OUTPATIENT
Start: 2018-10-19 | End: 2018-10-19

## 2018-10-19 RX ORDER — LABETALOL HYDROCHLORIDE 5 MG/ML
10 INJECTION, SOLUTION INTRAVENOUS
Status: DISCONTINUED | OUTPATIENT
Start: 2018-10-19 | End: 2018-10-19 | Stop reason: HOSPADM

## 2018-10-19 RX ORDER — BUPIVACAINE HYDROCHLORIDE AND EPINEPHRINE 2.5; 5 MG/ML; UG/ML
INJECTION, SOLUTION EPIDURAL; INFILTRATION; INTRACAUDAL; PERINEURAL PRN
Status: DISCONTINUED | OUTPATIENT
Start: 2018-10-19 | End: 2018-10-19 | Stop reason: HOSPADM

## 2018-10-19 RX ORDER — FENTANYL CITRATE 50 UG/ML
25-50 INJECTION, SOLUTION INTRAMUSCULAR; INTRAVENOUS
Status: DISCONTINUED | OUTPATIENT
Start: 2018-10-19 | End: 2018-10-19 | Stop reason: HOSPADM

## 2018-10-19 RX ORDER — ONDANSETRON 2 MG/ML
INJECTION INTRAMUSCULAR; INTRAVENOUS PRN
Status: DISCONTINUED | OUTPATIENT
Start: 2018-10-19 | End: 2018-10-19

## 2018-10-19 RX ORDER — FENTANYL CITRATE 50 UG/ML
INJECTION, SOLUTION INTRAMUSCULAR; INTRAVENOUS PRN
Status: DISCONTINUED | OUTPATIENT
Start: 2018-10-19 | End: 2018-10-19

## 2018-10-19 RX ORDER — ONDANSETRON 2 MG/ML
4 INJECTION INTRAMUSCULAR; INTRAVENOUS EVERY 30 MIN PRN
Status: DISCONTINUED | OUTPATIENT
Start: 2018-10-19 | End: 2018-10-19 | Stop reason: HOSPADM

## 2018-10-19 RX ORDER — MEPERIDINE HYDROCHLORIDE 50 MG/ML
12.5 INJECTION INTRAMUSCULAR; INTRAVENOUS; SUBCUTANEOUS
Status: DISCONTINUED | OUTPATIENT
Start: 2018-10-19 | End: 2018-10-19 | Stop reason: HOSPADM

## 2018-10-19 RX ORDER — GLYCOPYRROLATE 0.2 MG/ML
INJECTION, SOLUTION INTRAMUSCULAR; INTRAVENOUS PRN
Status: DISCONTINUED | OUTPATIENT
Start: 2018-10-19 | End: 2018-10-19

## 2018-10-19 RX ORDER — OXYCODONE HYDROCHLORIDE 5 MG/1
5-10 TABLET ORAL EVERY 4 HOURS PRN
Qty: 12 TABLET | Refills: 0 | Status: SHIPPED | OUTPATIENT
Start: 2018-10-19 | End: 2018-11-19

## 2018-10-19 RX ORDER — PROPOFOL 10 MG/ML
INJECTION, EMULSION INTRAVENOUS PRN
Status: DISCONTINUED | OUTPATIENT
Start: 2018-10-19 | End: 2018-10-19

## 2018-10-19 RX ORDER — HYDRALAZINE HYDROCHLORIDE 20 MG/ML
2.5-5 INJECTION INTRAMUSCULAR; INTRAVENOUS EVERY 10 MIN PRN
Status: DISCONTINUED | OUTPATIENT
Start: 2018-10-19 | End: 2018-10-19 | Stop reason: HOSPADM

## 2018-10-19 RX ORDER — ONDANSETRON 4 MG/1
4 TABLET, ORALLY DISINTEGRATING ORAL EVERY 30 MIN PRN
Status: DISCONTINUED | OUTPATIENT
Start: 2018-10-19 | End: 2018-10-19 | Stop reason: HOSPADM

## 2018-10-19 RX ORDER — HYDROMORPHONE HYDROCHLORIDE 1 MG/ML
.3-.5 INJECTION, SOLUTION INTRAMUSCULAR; INTRAVENOUS; SUBCUTANEOUS EVERY 10 MIN PRN
Status: DISCONTINUED | OUTPATIENT
Start: 2018-10-19 | End: 2018-10-19 | Stop reason: HOSPADM

## 2018-10-19 RX ORDER — CEFAZOLIN SODIUM 2 G/100ML
2 INJECTION, SOLUTION INTRAVENOUS
Status: COMPLETED | OUTPATIENT
Start: 2018-10-19 | End: 2018-10-19

## 2018-10-19 RX ORDER — CEFAZOLIN SODIUM 1 G/3ML
1 INJECTION, POWDER, FOR SOLUTION INTRAMUSCULAR; INTRAVENOUS SEE ADMIN INSTRUCTIONS
Status: DISCONTINUED | OUTPATIENT
Start: 2018-10-19 | End: 2018-10-19 | Stop reason: HOSPADM

## 2018-10-19 RX ORDER — OXYCODONE HYDROCHLORIDE 5 MG/1
5 TABLET ORAL
Status: DISCONTINUED | OUTPATIENT
Start: 2018-10-19 | End: 2018-10-19 | Stop reason: HOSPADM

## 2018-10-19 RX ADMIN — ONDANSETRON 4 MG: 2 INJECTION INTRAMUSCULAR; INTRAVENOUS at 12:39

## 2018-10-19 RX ADMIN — FENTANYL CITRATE 100 MCG: 50 INJECTION, SOLUTION INTRAMUSCULAR; INTRAVENOUS at 12:11

## 2018-10-19 RX ADMIN — FENTANYL CITRATE 25 MCG: 50 INJECTION, SOLUTION INTRAMUSCULAR; INTRAVENOUS at 12:43

## 2018-10-19 RX ADMIN — FENTANYL CITRATE 50 MCG: 50 INJECTION, SOLUTION INTRAMUSCULAR; INTRAVENOUS at 13:04

## 2018-10-19 RX ADMIN — MIDAZOLAM 2 MG: 1 INJECTION INTRAMUSCULAR; INTRAVENOUS at 12:02

## 2018-10-19 RX ADMIN — FENTANYL CITRATE 25 MCG: 50 INJECTION, SOLUTION INTRAMUSCULAR; INTRAVENOUS at 12:41

## 2018-10-19 RX ADMIN — SODIUM CHLORIDE, POTASSIUM CHLORIDE, SODIUM LACTATE AND CALCIUM CHLORIDE: 600; 310; 30; 20 INJECTION, SOLUTION INTRAVENOUS at 11:30

## 2018-10-19 RX ADMIN — PROPOFOL 170 MG: 10 INJECTION, EMULSION INTRAVENOUS at 12:11

## 2018-10-19 RX ADMIN — BUPIVACAINE HYDROCHLORIDE AND EPINEPHRINE BITARTRATE 20 ML: 2.5; .0091 INJECTION, SOLUTION EPIDURAL; INFILTRATION; INTRACAUDAL; PERINEURAL at 13:00

## 2018-10-19 RX ADMIN — CEFAZOLIN SODIUM 2 G: 2 INJECTION, SOLUTION INTRAVENOUS at 12:10

## 2018-10-19 RX ADMIN — LIDOCAINE HYDROCHLORIDE 40 MG: 10 INJECTION, SOLUTION INFILTRATION; PERINEURAL at 12:11

## 2018-10-19 RX ADMIN — GLYCOPYRROLATE 0.2 MG: 0.2 INJECTION, SOLUTION INTRAMUSCULAR; INTRAVENOUS at 12:11

## 2018-10-19 RX ADMIN — LIDOCAINE HYDROCHLORIDE 5 ML: 10 INJECTION, SOLUTION INFILTRATION; PERINEURAL at 10:57

## 2018-10-19 RX ADMIN — DEXAMETHASONE SODIUM PHOSPHATE 4 MG: 4 INJECTION, SOLUTION INTRA-ARTICULAR; INTRALESIONAL; INTRAMUSCULAR; INTRAVENOUS; SOFT TISSUE at 12:11

## 2018-10-19 NOTE — ANESTHESIA PREPROCEDURE EVALUATION
PAC NOTE:       ANESTHESIA PRE EVALUATION:  Anesthesia Evaluation     .             ROS/MED HX    ENT/Pulmonary:       Neurologic:  - neg neurologic ROS     Cardiovascular:         METS/Exercise Tolerance:     Hematologic:  - neg hematologic  ROS       Musculoskeletal:  - neg musculoskeletal ROS       GI/Hepatic:  - neg GI/hepatic ROS       Renal/Genitourinary:  - ROS Renal section negative       Endo:  - neg endo ROS       Psychiatric:  - neg psychiatric ROS       Infectious Disease:  - neg infectious disease ROS       Malignancy:         Other:                     Physical Exam  Normal systems: cardiovascular and pulmonary    Airway   Mallampati: II  TM distance: >3 FB  Neck ROM: full    Dental     Cardiovascular       Pulmonary              Anesthesia Plan      History & Physical Review  History and physical reviewed and following examination; no interval change.    ASA Status:  2 .    NPO Status:  > 8 hours    Plan for General and LMA with Intravenous and Propofol induction. Maintenance will be Balanced.    PONV prophylaxis:  Ondansetron (or other 5HT-3) and Dexamethasone or Solumedrol       Postoperative Care  Postoperative pain management:  IV analgesics.      Consents  Anesthetic plan, risks, benefits and alternatives discussed with:  Patient.  Use of blood products discussed: Yes.   Use of blood products discussed with Patient.  Consented to blood products.  .                            .

## 2018-10-19 NOTE — IP AVS SNAPSHOT
MRN:[id number]                      After Visit Summary   10/19/2018    Marlen Solano    MRN: [id number]           Thank you!     Thank you for choosing [facility] for your care. Our goal is always to provide you with excellent care. Hearing back from our patients is one way we can continue to improve our services. Please take a few minutes to complete the written survey that you may receive in the mail after you visit. If you would like to speak to someone directly about your visit please contact Patient Relations at [phone]. Thank you!          Patient Information     Date Of Birth          1975        About your hospital stay     You were admitted on:  October 19, 2018 You last received care in the:  [facility] [place] PreOP/PostOP    You were discharged on:  October 19, 2018       Who to Call     For medical emergencies, please call 911.  For non-urgent questions about your medical care, please call your primary care provider or clinic, [phone]  For questions related to your surgery, please call your surgery clinic        Attending Provider     Provider Specialty    Hosea Fernandez MD General Surgery       Primary Care Provider Office Phone # Fax #    Mary Ellen White [phone]      Further instructions from your care team       HOME CARE FOLLOWING BREAST BIOPSY  NILESH Lou, BURTON Valentin, ELIZABETH Gómez, GARIMA Clark    RESULTS:  You may call for your final pathology report after 1p.m. two working days after surgery.    INCISIONAL CARE:    If you have a dressing in place, keep clean and dry for 48 hours; you may replace the gauze if it becomes soiled.    After 48 hours you may remove the dressing and shower.  Do not submerse incision in water for 1 week.    Sutures will absorb and do not need to be removed.    If present, leave the steri-strips (white paper tapes) in place for 14 days after surgery.    You may expect a small  amount of drainage from your incision.    A lump/ridge under the incision is normal and will gradually resolve.    SUPPORT:  Wear a bra for support and comfort for 3-7 days, day and night.    ACTIVITY:  Cautiously resume exercise and strenuous activities such as jogging, tennis, aerobics, etc. Also, be careful of stretching activities with operative side for two weeks.    DIET:  No restrictions.  Increased fluid intake is recommended. While taking pain medications, increase dietary fiber or add a fiber supplementation like Metamucil or Citrucel to help prevent constipation - a possible side effect of pain medications.    DISCOMFORT:  Local anesthetic placed at surgery should provide relief for 4-8 hours.  Begin taking pain pills before discomfort is severe.  Take the pain medication with some food, when possible, to minimize side effects.  Intermittent use of ice packs may help during the first 48 hours.  Expect gradual improvement.    RETURN APPOINTMENT:  Schedule a follow-up visit 2-3 weeks post-op.  Office Phone:  [phone]     CONTACT US IF THE FOLLOWING DEVELOPS:   1. A fever that is above 101     2. If there is a large amount of drainage, bleeding, or swelling.   3. Severe pain that is not relieved by your prescription.   4. Drainage that is thick, cloudy, yellow, green or white.   5. Any other questions not answered by  Frequently Asked Questions  sheet.      FREQUENTLY ASKED QUESTIONS:    Q:  How should my incision look?    A:  Normally your incision will appear slightly swollen with light redness directly along the incision itself as it heals.  It may feel like a bump or ridge as the healing/scarring happens, and over time (3-4 months) this bump or ridge feeling should slowly go away.  In general, clear or pink watery drainage can be normal at first as your incision heals, but should decrease over time.    Q:  How do I know if my incision is infected?  A:  Look at your incision for signs of infection,  like redness around the incision spreading to surrounding skin, or drainage of cloudy or foul-smelling drainage.  If you feel warm, check your temperature to see if you are running a fever.    **If any of these things occur, please notify the nurse at our office.  We may need you to come into the office for an incision check.      Q:  How do I take care of my incision?  A:  If you have a dressing in place - Starting the day after surgery, replace the dressing 1-2 times a day until there is no further drainage from the incision.  At that time, a dressing is no longer needed.  Try to minimize tape on the skin if irritation is occurring at the tape sites.  If you have significant irritation from tape on the skin, please call the office to discuss other method of dressing your incision.    Small pieces of tape called  steri-strips  may be present directly overlying your incision; these may be removed 10 days after surgery unless otherwise specified by your surgeon.  If these tapes start to loosen at the ends, you may trim them back until they fall off or are removed.      Q:  There is a piece of tape or a sticky  lead  still on my skin.  Can I remove this?  A:  Sometimes the sticky  leads  used for monitoring during surgery or for evaluation in the emergency department are not all removed while you are in the hospital.  These sometimes have a tab or metal dot on them.  You can easily remove these on your own, like taking off a band-aid.  If there is a gel substance under the  lead , simply wipe/clean it off with a washcloth or paper towel.      Q:  What can I do to minimize constipation (very hard stools, or lack of stools)?  A:  Stay well hydrated.  Increase your dietary fiber intake or take a fiber supplement -with plenty of water.  Walk around frequently.  You may consider an over-the-counter stool-softener.  Your Pharmacist can assist you with choosing one that is stocked at your pharmacy.  Constipation is also one  of the most common side effects of pain medication.  If you are using pain medication, be pro-active and try to PREVENT problems with constipation by taking the steps above BEFORE constipation becomes a problem.    Q:  What do I do if I need more pain medications?  A:  Call the office to receive refills.  Be aware that certain pain meds cannot be called into a pharmacy and actually require a paper prescription.  A change may be made in your pain med as you progress thru your recovery period or if you have side effects to certain meds.    --Pain meds are NOT refilled after 5pm on weekdays, and NOT AT ALL on the weekends, so please look ahead to prevent problems.      Q:  Why am I having a hard time sleeping now that I am at home?  A:  Many medications you receive while you are in the hospital can impact your sleep for a number of days after your surgery/hospitalization.  Decreased level of activity and naps during the day may also make sleeping at night difficult.  Try to minimize day-time naps, and get up frequently during the day to walk around your home during your recovery time.  Sleep aides may be of some help, but are not recommended for long-term use.      Q:  I am having some back discomfort.  What should I do?  A:  This may be related to certain positioning that was required for your surgery, extended periods of time in bed, or other changes in your overall activity level.  You may try ice, heat, acetaminophen, or ibuprofen to treat this temporarily.  Note that many pain medications have acetaminophen in them and would state this on the prescription bottle.  Be sure not to exceed the maximum of 4000mg per day of acetaminophen.     **If the pain you are having does not resolve, is severe, or is a flare of back pain you have had on other occasions prior to surgery, please contact your primary physician for further recommendations or for an appointment to be examined at their office.    Q:  Why am I having  headaches?  A:  Headaches can be caused by many things:  caffeine withdrawal, use of pain meds, dehydration, high blood pressure, lack of sleep, over-activity/exhaustion, flare-up of usual migraine headaches.  If you feel this is related to muscle tension (a band-like feeling around the head, or a pressure at the low-back of the head) you may try ice or heat to this area.  You may need to drink more fluids (try electrolyte drink like Gatorade), rest, or take your usual migraine medications.   **If your headaches do not resolve, worsen, are accompanied by other symptoms, or if your blood pressure is high, please call your primary physician for recommendation and/or examination.    Q:  I am unable to urinate.  What do I do?  A:  A small percentage of people can have difficulty urinating initially after surgery.  This includes being able to urinate only a very small amount at a time and feeling discomfort or pressure in the very low abdomen.  This is called  urinary retention , and is actually an urgent situation.  Proceed to your nearest Emergency department for evaluation (not an Urgent Care Center).  Sometimes the bladder does not work correctly after certain medications you receive during surgery, or related to certain procedures.  You may need to have a catheter placed until your bladder recovers.  When planning to go to an Emergency department, it may help to call the ER to let them know you are coming in for this problem after a surgery.  This may help you get in quicker to be evaluated.  **If you have symptoms of a urinary tract infection, please contact your primary physician for the proper evaluation and treatment.          If you have other questions, please call the office Monday thru Friday between 8am and 5pm to discuss with the nurse or physician assistant.  #[phone]    There is a surgeon ON CALL on weekday evenings and over the weekend in case of urgent need only, and may be contacted at the same  number.    If you are having an emergency, call 911 or proceed to your nearest emergency department.      GENERAL ANESTHESIA OR SEDATION ADULT DISCHARGE INSTRUCTIONS   SPECIAL PRECAUTIONS FOR 24 HOURS AFTER SURGERY    IT IS NOT UNUSUAL TO FEEL LIGHT-HEADED OR FAINT, UP TO 24 HOURS AFTER SURGERY OR WHILE TAKING PAIN MEDICATION.  IF YOU HAVE THESE SYMPTOMS; SIT FOR A FEW MINUTES BEFORE STANDING AND HAVE SOMEONE ASSIST YOU WHEN YOU GET UP TO WALK OR USE THE BATHROOM.    YOU SHOULD REST AND RELAX FOR THE NEXT 24 HOURS AND YOU MUST MAKE ARRANGEMENTS TO HAVE SOMEONE STAY WITH YOU FOR AT LEAST 24 HOURS AFTER YOUR DISCHARGE.  AVOID HAZARDOUS AND STRENUOUS ACTIVITIES.  DO NOT MAKE IMPORTANT DECISIONS FOR 24 HOURS.    DO NOT DRIVE ANY VEHICLE OR OPERATE MECHANICAL EQUIPMENT FOR 24 HOURS FOLLOWING THE END OF YOUR SURGERY.  EVEN THOUGH YOU MAY FEEL NORMAL, YOUR REACTIONS MAY BE AFFECTED BY THE MEDICATION YOU HAVE RECEIVED.    DO NOT DRINK ALCOHOLIC BEVERAGES FOR 24 HOURS FOLLOWING YOUR SURGERY.    DRINK CLEAR LIQUIDS (APPLE JUICE, GINGER ALE, 7-UP, BROTH, ETC.).  PROGRESS TO YOUR REGULAR DIET AS YOU FEEL ABLE.    YOU MAY HAVE A DRY MOUTH, A SORE THROAT, MUSCLES ACHES OR TROUBLE SLEEPING.  THESE SHOULD GO AWAY AFTER 24 HOURS.    CALL YOUR DOCTOR FOR ANY OF THE FOLLOWING:  SIGNS OF INFECTION (FEVER, GROWING TENDERNESS AT THE SURGERY SITE, A LARGE AMOUNT OF DRAINAGE OR BLEEDING, SEVERE PAIN, FOUL-SMELLING DRAINAGE, REDNESS OR SWELLING.    IT HAS BEEN OVER 8 TO 10 HOURS SINCE SURGERY AND YOU ARE STILL NOT ABLE TO URINATE (PASS WATER).       Pending Results     Date and Time Order Name Status Description    10/19/2018 1233 Surgical pathology exam In process             Admission Information     Date & Time Provider Department Dept. Phone    10/19/2018 Hosea Fernandez MD [facility] PreOP/PostOP [phone]      Your Vitals Were     Blood Pressure Temperature Respirations Height Weight Pulse Oximetry    133/95 97.9  F (36.6  C)  "12 1.626 m (5' 4\") 87.7 kg (193 lb 6.4 oz) 96%    BMI (Body Mass Index)                   33.2 kg/m2           Care EveryWhere ID     This is your Care EveryWhere ID. This could be used by other organizations to access your [place] medical records  [id number]        Equal Access to Services     JEAN RUSH : Hadii mateusz ku hadjuan manuelo Soomaali, waaxda luqadaha, qaybta kaalmada jose eduardokalen, angel juarez keyshamarie whittgail joseph mario shoemaker. So [facility] [phone].    ATENCIÓN: Si habla español, tiene a hill disposición servicios gratuitos de asistencia lingüística. Bina al [phone].    We comply with applicable federal civil rights laws and [place] laws. We do not discriminate on the basis of race, color, national origin, age, disability, sex, sexual orientation, or gender identity.               Review of your medicines      START taking        Dose / Directions    oxyCODONE IR 5 MG tablet   Commonly known as:  ROXICODONE   Used for:  Lump of right breast        Dose:  5-10 mg   Take 1-2 tablets (5-10 mg) by mouth every 4 hours as needed for moderate to severe pain   Quantity:  12 tablet   Refills:  0            Where to get your medicines      Some of these will need a paper prescription and others can be bought over the counter. Ask your nurse if you have questions.     Bring a paper prescription for each of these medications     oxyCODONE IR 5 MG tablet                Protect others around you: Learn how to safely use, store and throw away your medicines at www.disposemymeds.org.        Information about OPIOIDS     PRESCRIPTION OPIOIDS: WHAT YOU NEED TO KNOW   We gave you an opioid (narcotic) pain medicine. It is important to manage your pain, but opioids are not always the best choice. You should first try all the other options your care team gave you. Take this medicine for as short a time (and as few doses) as possible.    Some activities can increase your pain, such as bandage changes or therapy sessions. It may help " to take your pain medicine 30 to 60 minutes before these activities. Reduce your stress by getting enough sleep, working on hobbies you enjoy and practicing relaxation or meditation. Talk to your care team about ways to manage your pain beyond prescription opioids.    These medicines have risks:    DO NOT drive when on new or higher doses of pain medicine. These medicines can affect your alertness and reaction times, and you could be arrested for driving under the influence (DUI). If you need to use opioids long-term, talk to your care team about driving.    DO NOT operate heavy machinery    DO NOT do any other dangerous activities while taking these medicines.    DO NOT drink any alcohol while taking these medicines.     If the opioid prescribed includes acetaminophen, DO NOT take with any other medicines that contain acetaminophen. Read all labels carefully. Look for the word  acetaminophen  or  Tylenol.  Ask your pharmacist if you have questions or are unsure.    You can get addicted to pain medicines, especially if you have a history of addiction (chemical, alcohol or substance dependence). Talk to your care team about ways to reduce this risk.    All opioids tend to cause constipation. Drink plenty of water and eat foods that have a lot of fiber, such as fruits, vegetables, prune juice, apple juice and high-fiber cereal. Take a laxative (Miralax, milk of magnesia, Colace, Senna) if you don t move your bowels at least every other day. Other side effects include upset stomach, sleepiness, dizziness, throwing up, tolerance (needing more of the medicine to have the same effect), physical dependence and slowed breathing.    Store your pills in a secure place, locked if possible. We will not replace any lost or stolen medicine. If you don t finish your medicine, please throw away (dispose) as directed by your pharmacist. The [place] [employer]ution [employer] has more information about safe disposal:  https://www.pca.[facility].[place].us/living-green/managing-unwanted-medications             Medication List: This is a list of all your medications and when to take them. Check marks below indicate your daily home schedule. Keep this list as a reference.      Medications           Morning Afternoon Evening Bedtime As Needed    oxyCODONE IR 5 MG tablet   Commonly known as:  ROXICODONE   Take 1-2 tablets (5-10 mg) by mouth every 4 hours as needed for moderate to severe pain

## 2018-10-19 NOTE — OP NOTE
General Surgery Operative Note      Pre-operative diagnosis: Left breast painful skin lesion, right breast mass with ALH on core biopsy   Post-operative diagnosis: Same   Procedure: Right seed-localized breast biopsy, left skin lesion excision (6 cm)    Surgeon: Hosea Fernandez MD   Assistant(s): Corie Rogers PA-C  The Physician Assistant was medically necessary for their expertise in prepping, camera management, suctioning, suturing and retraction.   Anesthesia: General    Estimated blood loss:   10 cc     Specimens:   ID Type Source Tests Collected by Time Destination   A : right breast seed localized biopsy  Tissue Breast, Right SURGICAL PATHOLOGY EXAM Hosea Fernandez MD 10/19/2018 12:32 PM    B : skin lesion left breast  Tissue Breast, Left SURGICAL PATHOLOGY EXAM Hosea Fernandez MD 10/19/2018 12:41 PM           INDICATIONS:  Right breast mass, 9 o'clock.  Percutaneous biopsy reveals ALH.     DESCRIPTION OF PROCEDURE:  The patient was placed on the table in supine position.  Anesthetic was administered.  Both breasts were prepped and draped in standard sterile fashion.  We used the seed placed in the [facility] as well as the post-seed mammograms to localize the area of interest.  After anesthetizing the skin we made an incision centered at the 09:30 o'clock position.  We carried the incision down into the breast tissue and excised the area of interest, including the seed.  A specimen mammogram was performed and sent via PACS to the Breast Center.  The breast radiologist confirmed the presence of the area of interest including the seed and the clip which had been placed at the time of her biopsy.  A new clip was placed Hemostasis was maintained throughout with electrocautery.  The skin was closed with running 4-0 Vicryl subcuticular suture and steri-strips.  The patient tolerated the procedure well.  Sponge and instrument counts were correct.  We then turned our attention to the left breast.  The skin  lesion had been marked with her in preinduction.  An elliptical incision was made parallel to the lines of skin tension which was also parallel to the long axis of the skin lesion.  The 6 cm skin lesion was then excised.  Closure was performed with 3-0 and 4-0 PDS with full-length Steri-Strips applied followed by a sterile dressing.  The specimen was marked with a suture at 12:00 to orient pathology.  She was then returned to the recovery room in excellent condition with all sponge and needle counts correct having tolerated the procedure well.    Hosea Fernandez MD

## 2018-10-19 NOTE — PROGRESS NOTES
SPIRITUAL HEALTH SERVICES  [facility]  PRE-SURGERY VISIT    Pre-surgical visit with pt.  Provided spiritual support, prayer.       Jaswant Gonsalves MA  Staff   Pager: [phone]  Phone: [phone]

## 2018-10-19 NOTE — ANESTHESIA CARE TRANSFER NOTE
Patient: Marlen Solano    Procedure(s):  right breast seed localized biopsy with excision left breast skin lesion    Diagnosis: Right breast mass, left breast skin lesion  Diagnosis Additional Information: No value filed.    Anesthesia Type:   General, ETT     Note:  Airway :Face Mask  Patient transferred to:PACU  Comments: Patient with spontaneous respirations and adequate tidal volumes. Patient awake and responsive. LMA removed in OR to 8 L face tent. To PACU ventilating well. VSS. Report given.Handoff Report: Identifed the Patient, Identified the Reponsible Provider, Reviewed the pertinent medical history, Discussed the surgical course, Reviewed Intra-OP anesthesia mangement and issues during anesthesia, Set expectations for post-procedure period and Allowed opportunity for questions and acknowledgement of understanding      Vitals: (Last set prior to Anesthesia Care Transfer)    CRNA VITALS  10/19/2018 1232 - 10/19/2018 1309      10/19/2018             Pulse: 66    SpO2: 100 %    Resp Rate (observed): (!)  5                Electronically Signed By: JONATHAN Carrizales CRNA  October 19, 2018  1:09 PM

## 2018-10-19 NOTE — DISCHARGE INSTRUCTIONS
HOME CARE FOLLOWING BREAST BIOPSY  NILESH Lou, BURTON Valentin R. O Donnell, J. Shaheen    RESULTS:  You may call for your final pathology report after 1p.m. two working days after surgery.    INCISIONAL CARE:    If you have a dressing in place, keep clean and dry for 48 hours; you may replace the gauze if it becomes soiled.    After 48 hours you may remove the dressing and shower.  Do not submerse incision in water for 1 week.    Sutures will absorb and do not need to be removed.    If present, leave the steri-strips (white paper tapes) in place for 14 days after surgery.    You may expect a small amount of drainage from your incision.    A lump/ridge under the incision is normal and will gradually resolve.    SUPPORT:  Wear a bra for support and comfort for 3-7 days, day and night.    ACTIVITY:  Cautiously resume exercise and strenuous activities such as jogging, tennis, aerobics, etc. Also, be careful of stretching activities with operative side for two weeks.    DIET:  No restrictions.  Increased fluid intake is recommended. While taking pain medications, increase dietary fiber or add a fiber supplementation like Metamucil or Citrucel to help prevent constipation - a possible side effect of pain medications.    DISCOMFORT:  Local anesthetic placed at surgery should provide relief for 4-8 hours.  Begin taking pain pills before discomfort is severe.  Take the pain medication with some food, when possible, to minimize side effects.  Intermittent use of ice packs may help during the first 48 hours.  Expect gradual improvement.    RETURN APPOINTMENT:  Schedule a follow-up visit 2-3 weeks post-op.  Office Phone:  [phone]     CONTACT US IF THE FOLLOWING DEVELOPS:   1. A fever that is above 101     2. If there is a large amount of drainage, bleeding, or swelling.   3. Severe pain that is not relieved by your prescription.   4. Drainage that is thick, cloudy, yellow, green or white.   5. Any  other questions not answered by  Frequently Asked Questions  sheet.      FREQUENTLY ASKED QUESTIONS:    Q:  How should my incision look?    A:  Normally your incision will appear slightly swollen with light redness directly along the incision itself as it heals.  It may feel like a bump or ridge as the healing/scarring happens, and over time (3-4 months) this bump or ridge feeling should slowly go away.  In general, clear or pink watery drainage can be normal at first as your incision heals, but should decrease over time.    Q:  How do I know if my incision is infected?  A:  Look at your incision for signs of infection, like redness around the incision spreading to surrounding skin, or drainage of cloudy or foul-smelling drainage.  If you feel warm, check your temperature to see if you are running a fever.    **If any of these things occur, please notify the nurse at our office.  We may need you to come into the office for an incision check.      Q:  How do I take care of my incision?  A:  If you have a dressing in place - Starting the day after surgery, replace the dressing 1-2 times a day until there is no further drainage from the incision.  At that time, a dressing is no longer needed.  Try to minimize tape on the skin if irritation is occurring at the tape sites.  If you have significant irritation from tape on the skin, please call the office to discuss other method of dressing your incision.    Small pieces of tape called  steri-strips  may be present directly overlying your incision; these may be removed 10 days after surgery unless otherwise specified by your surgeon.  If these tapes start to loosen at the ends, you may trim them back until they fall off or are removed.      Q:  There is a piece of tape or a sticky  lead  still on my skin.  Can I remove this?  A:  Sometimes the sticky  leads  used for monitoring during surgery or for evaluation in the emergency department are not all removed while you are  in the hospital.  These sometimes have a tab or metal dot on them.  You can easily remove these on your own, like taking off a band-aid.  If there is a gel substance under the  lead , simply wipe/clean it off with a washcloth or paper towel.      Q:  What can I do to minimize constipation (very hard stools, or lack of stools)?  A:  Stay well hydrated.  Increase your dietary fiber intake or take a fiber supplement -with plenty of water.  Walk around frequently.  You may consider an over-the-counter stool-softener.  Your Pharmacist can assist you with choosing one that is stocked at your pharmacy.  Constipation is also one of the most common side effects of pain medication.  If you are using pain medication, be pro-active and try to PREVENT problems with constipation by taking the steps above BEFORE constipation becomes a problem.    Q:  What do I do if I need more pain medications?  A:  Call the office to receive refills.  Be aware that certain pain meds cannot be called into a pharmacy and actually require a paper prescription.  A change may be made in your pain med as you progress thru your recovery period or if you have side effects to certain meds.    --Pain meds are NOT refilled after 5pm on weekdays, and NOT AT ALL on the weekends, so please look ahead to prevent problems.      Q:  Why am I having a hard time sleeping now that I am at home?  A:  Many medications you receive while you are in the hospital can impact your sleep for a number of days after your surgery/hospitalization.  Decreased level of activity and naps during the day may also make sleeping at night difficult.  Try to minimize day-time naps, and get up frequently during the day to walk around your home during your recovery time.  Sleep aides may be of some help, but are not recommended for long-term use.      Q:  I am having some back discomfort.  What should I do?  A:  This may be related to certain positioning that was required for your  surgery, extended periods of time in bed, or other changes in your overall activity level.  You may try ice, heat, acetaminophen, or ibuprofen to treat this temporarily.  Note that many pain medications have acetaminophen in them and would state this on the prescription bottle.  Be sure not to exceed the maximum of 4000mg per day of acetaminophen.     **If the pain you are having does not resolve, is severe, or is a flare of back pain you have had on other occasions prior to surgery, please contact your primary physician for further recommendations or for an appointment to be examined at their office.    Q:  Why am I having headaches?  A:  Headaches can be caused by many things:  caffeine withdrawal, use of pain meds, dehydration, high blood pressure, lack of sleep, over-activity/exhaustion, flare-up of usual migraine headaches.  If you feel this is related to muscle tension (a band-like feeling around the head, or a pressure at the low-back of the head) you may try ice or heat to this area.  You may need to drink more fluids (try electrolyte drink like Gatorade), rest, or take your usual migraine medications.   **If your headaches do not resolve, worsen, are accompanied by other symptoms, or if your blood pressure is high, please call your primary physician for recommendation and/or examination.    Q:  I am unable to urinate.  What do I do?  A:  A small percentage of people can have difficulty urinating initially after surgery.  This includes being able to urinate only a very small amount at a time and feeling discomfort or pressure in the very low abdomen.  This is called  urinary retention , and is actually an urgent situation.  Proceed to your nearest Emergency department for evaluation (not an Urgent Care Center).  Sometimes the bladder does not work correctly after certain medications you receive during surgery, or related to certain procedures.  You may need to have a catheter placed until your bladder  recovers.  When planning to go to an Emergency department, it may help to call the ER to let them know you are coming in for this problem after a surgery.  This may help you get in quicker to be evaluated.  **If you have symptoms of a urinary tract infection, please contact your primary physician for the proper evaluation and treatment.          If you have other questions, please call the office Monday thru Friday between 8am and 5pm to discuss with the nurse or physician assistant.  [phone]    There is a surgeon ON CALL on weekday evenings and over the weekend in case of urgent need only, and may be contacted at the same number.    If you are having an emergency, call 911 or proceed to your nearest emergency department.      GENERAL ANESTHESIA OR SEDATION ADULT DISCHARGE INSTRUCTIONS   SPECIAL PRECAUTIONS FOR 24 HOURS AFTER SURGERY    IT IS NOT UNUSUAL TO FEEL LIGHT-HEADED OR FAINT, UP TO 24 HOURS AFTER SURGERY OR WHILE TAKING PAIN MEDICATION.  IF YOU HAVE THESE SYMPTOMS; SIT FOR A FEW MINUTES BEFORE STANDING AND HAVE SOMEONE ASSIST YOU WHEN YOU GET UP TO WALK OR USE THE BATHROOM.    YOU SHOULD REST AND RELAX FOR THE NEXT 24 HOURS AND YOU MUST MAKE ARRANGEMENTS TO HAVE SOMEONE STAY WITH YOU FOR AT LEAST 24 HOURS AFTER YOUR DISCHARGE.  AVOID HAZARDOUS AND STRENUOUS ACTIVITIES.  DO NOT MAKE IMPORTANT DECISIONS FOR 24 HOURS.    DO NOT DRIVE ANY VEHICLE OR OPERATE MECHANICAL EQUIPMENT FOR 24 HOURS FOLLOWING THE END OF YOUR SURGERY.  EVEN THOUGH YOU MAY FEEL NORMAL, YOUR REACTIONS MAY BE AFFECTED BY THE MEDICATION YOU HAVE RECEIVED.    DO NOT DRINK ALCOHOLIC BEVERAGES FOR 24 HOURS FOLLOWING YOUR SURGERY.    DRINK CLEAR LIQUIDS (APPLE JUICE, GINGER ALE, 7-UP, BROTH, ETC.).  PROGRESS TO YOUR REGULAR DIET AS YOU FEEL ABLE.    YOU MAY HAVE A DRY MOUTH, A SORE THROAT, MUSCLES ACHES OR TROUBLE SLEEPING.  THESE SHOULD GO AWAY AFTER 24 HOURS.    CALL YOUR DOCTOR FOR ANY OF THE FOLLOWING:  SIGNS OF INFECTION (FEVER,  GROWING TENDERNESS AT THE SURGERY SITE, A LARGE AMOUNT OF DRAINAGE OR BLEEDING, SEVERE PAIN, FOUL-SMELLING DRAINAGE, REDNESS OR SWELLING.    IT HAS BEEN OVER 8 TO 10 HOURS SINCE SURGERY AND YOU ARE STILL NOT ABLE TO URINATE (PASS WATER).

## 2018-10-19 NOTE — IP AVS SNAPSHOT
[facility] PreOP/PostOP    [address]    [facility] [address]    Phone:  [phone]    Fax:  [phone]                                       After Visit Summary   10/19/2018    Marlen Solano    MRN: [id number]           After Visit Summary Signature Page     I have received my discharge instructions, and my questions have been answered. I have discussed any challenges I see with this plan with the nurse or doctor.    ..........................................................................................................................................  Patient/Patient Representative Signature      ..........................................................................................................................................  Patient Representative Print Name and Relationship to Patient    ..................................................               ................................................  Date                                   Time    ..........................................................................................................................................  Reviewed by Signature/Title    ...................................................              ..............................................  Date                                               Time          22EPIC Rev 08/18

## 2018-10-19 NOTE — PROGRESS NOTES
SBAR Seed Localization    SITUATION:  Patient to breast imaging center for imaging guided seed localizations before breast lumpectomy or excision biopsy with sentinel node injection.    BACKGROUND:  Breast imaging cancer, breast abnormality  Ordered procedure completed: Yes  Special needs identified: No     ASSESSMENT:  SBAR report called to patient care unit because of unexpected event in radiology: No  Allergies and medication list reviewed prior to procedure. Yes  Skin cleansed with ChloraPrep One-Step.  Anesthesia: approximately 5ml of 1% Lidocaine injection subcutaneous before seed insertion administered by the radiologist.   Gauze dressing over insertion site(s).  Post procedure mammogram completed: Yes    Patient tolerance:well tolerated    RECOMMENDATIONS:  Patient transferred to Same Day Surgery in stable condition via wheelchair with Breast Imaging Staff.  Copy of note given to patient and instructions to hand this note to surgery staff.    Please call [facility] [phone] if there are any questions.

## 2018-10-19 NOTE — ANESTHESIA POSTPROCEDURE EVALUATION
Patient: Marlen Solano    Procedure(s):  right breast seed localized biopsy with excision left breast skin lesion    Diagnosis:Right breast mass, left breast skin lesion  Diagnosis Additional Information: Op Note by Hosea Fernandez MD at 10/19/2018 12:59 PM  Version 1 of 1    Author: Hosea Fernandez MD Service: Surgery Author Type: Physician    Filed: 10/19/2018  1:00 PM Date of Service: 10/19/2018 12:59 PM Creation Time: 10/19/2018 12:55 PM    Stat, us: Signed : Hosea Fernandez MD (Physician)         General Surgery Operative Note        Pre-operative diagnosis: Left breast painful skin lesion, right breast mass with ALH on core biopsy  Post-operative diagnosis: Same  Procedure: Right see, d-localized breast biopsy, left skin lesion excision (6 cm)   Surgeon: Hosea Fernandez MD  Assistant(s): Corie Rogers PA-C  The Physician Assistant was medically necessary for their expertise in prepping, camera management, suctioning, suturing,  and retraction.  Anesthesia: General   Estimated blood loss: 10 cc  Specimens:      ID               Anesthesia Type:  General, LMA    Note:  Anesthesia Post Evaluation    Patient location during evaluation: PACU  Patient participation: Able to fully participate in evaluation  Level of consciousness: awake  Pain management: adequate  Airway patency: patent  Cardiovascular status: acceptable  Respiratory status: acceptable  Hydration status: acceptable  PONV: none     Anesthetic complications: None          Last vitals:  Vitals:    10/19/18 0955 10/19/18 1305   BP: (!) 148/109    Resp: 16    Temp: 97  F (36.1  C) 96.5  F (35.8  C)   SpO2: 100%          Electronically Signed By: Vic Haley MD  October 19, 2018  1:10 PM

## 2018-10-22 LAB — COPATH REPORT: NORMAL

## 2018-11-19 ENCOUNTER — OFFICE VISIT (OUTPATIENT)
Dept: SURGERY | Facility: CLINIC | Age: 43
End: 2018-11-19
Payer: COMMERCIAL

## 2018-11-19 VITALS
SYSTOLIC BLOOD PRESSURE: 124 MMHG | BODY MASS INDEX: 32.95 KG/M2 | DIASTOLIC BLOOD PRESSURE: 86 MMHG | WEIGHT: 193 LBS | OXYGEN SATURATION: 98 % | HEART RATE: 68 BPM | RESPIRATION RATE: 16 BRPM | HEIGHT: 64 IN

## 2018-11-19 DIAGNOSIS — Z09 SURGICAL FOLLOWUP VISIT: Primary | ICD-10-CM

## 2018-11-19 PROCEDURE — 99024 POSTOP FOLLOW-UP VISIT: CPT | Performed by: SURGERY

## 2018-11-19 NOTE — MR AVS SNAPSHOT
"              After Visit Summary   11/19/2018    Marlen Solano    MRN: [id number]           Patient Information     Date Of Birth          1975        Visit Information        Provider Department      11/19/2018 2:30 PM Hosea Fernandez MD [facility] General Surgery      Today's Diagnoses     Surgical followup visit    -  1       Follow-ups after your visit        Who to contact     If you have questions or need follow up information about today's clinic visit or your schedule please contact [facility] directly at [phone].  Normal or non-critical lab and imaging results will be communicated to you by MyChart, letter or phone within 4 business days after the clinic has received the results. If you do not hear from us within 7 days, please contact the clinic through MyChart or phone. If you have a critical or abnormal lab result, we will notify you by phone as soon as possible.  Submit refill requests through [employer] or call your pharmacy and they will forward the refill request to us. Please allow 3 business days for your refill to be completed.          Additional Information About Your Visit        Care EveryWhere ID     This is your Care EveryWhere ID. This could be used by other organizations to access your [place] medical records  [id number]        Your Vitals Were     Pulse Respirations Height Pulse Oximetry Breastfeeding? BMI (Body Mass Index)    68 16 5' 4\" (1.626 m) 98% No 33.13 kg/m2       Blood Pressure from Last 3 Encounters:   11/19/18 124/86   10/19/18 (!) 146/94   10/11/18 116/70    Weight from Last 3 Encounters:   11/19/18 193 lb (87.5 kg)   10/19/18 193 lb 6.4 oz (87.7 kg)   10/11/18 197 lb 6.4 oz (89.5 kg)              Today, you had the following     No orders found for display       Primary Care Provider Office Phone # Fax #    Mary Ellen White [phone]       [address] [place] [address] " [place] [address]        Equal Access to Services     [facility] : Hadii aad ku hadjuan manueljan Kaminianastasia, walatanya chinchilla, ozjon manriquemakatya raygoza, angel shoemaker. So [facility] [phone].    ATENCIÓN: Si habla español, tiene a hill disposición servicios gratuitos de asistencia lingüística. Llame al [phone].    We comply with applicable federal civil rights laws and [place] laws. We do not discriminate on the basis of race, color, national origin, age, disability, sex, sexual orientation, or gender identity.            Thank you!     Thank you for choosing [facility]  for your care. Our goal is always to provide you with excellent care. Hearing back from our patients is one way we can continue to improve our services. Please take a few minutes to complete the written survey that you may receive in the mail after your visit with us. Thank you!             Your Updated Medication List - Protect others around you: Learn how to safely use, store and throw away your medicines at www.disposemymeds.org.      Notice  As of 11/19/2018  2:51 PM    You have not been prescribed any medications.

## 2018-11-19 NOTE — PROGRESS NOTES
She returns in follow-up after right breast biopsy and left breast skin lesion excision.  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  The breast biopsy however shows a complex fibroepithelial lesion with focal atypia.  For this reason, I have recommended that she contact oncology to discuss with them breast cancer risk reduction and surveillance.    Exam: Incisions appear to be healing nicely, there is no sign of complication, infection, seroma or hematoma    Impression: Excellent postop recovery, atypia on pathology    Plan: She would like to see Dr. Franks, I have contacted their office and I have also given her card to the patient.    Hosea Fernandez MD  11/19/2018 2:48 PM    Please route or send letter to:  Primary Care Provider (PCP) and Dr. Franks ([facility] oncology clinic)

## 2018-11-19 NOTE — LETTER
2018    RE: Marlen Solano, : 1975      She returns in follow-up after right breast biopsy and left breast skin lesion excision.  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  The breast biopsy however shows a complex fibroepithelial lesion with focal atypia.  For this reason, I have recommended that she contact oncology to discuss with them breast cancer risk reduction and surveillance.     Exam: Incisions appear to be healing nicely, there is no sign of complication, infection, seroma or hematoma     Impression: Excellent postop recovery, atypia on pathology     Plan: She would like to see Dr. Franks, I have contacted their office and I have also given her card to the patient.     Hosea Fernandez MD

## 2018-11-26 ENCOUNTER — TELEPHONE (OUTPATIENT)
Dept: ONCOLOGY | Facility: CLINIC | Age: 43
End: 2018-11-26

## 2018-11-26 NOTE — TELEPHONE ENCOUNTER
----- Message from Ashley Barth RN sent at 11/26/2018  2:29 PM CST -----  Hello--    I had called last week and talked with someone in the intake dept. 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   Referred by Dr Fernandez.    I don't see that the pt has been scheduled, and I don't see an intake note in her chart either.  Could you please let me know if someone has tried to reach the pt?  Thank you,  Ashley CRANDALL

## 2018-12-04 ENCOUNTER — TELEPHONE (OUTPATIENT)
Dept: ONCOLOGY | Facility: CLINIC | Age: 43
End: 2018-12-04

## 2018-12-04 NOTE — TELEPHONE ENCOUNTER
Left message for patient to return call and confirm time & date w/Dr. Franks 12/12 at 11:00 at  Mailed out schedule

## 2018-12-07 ENCOUNTER — NURSE TRIAGE (OUTPATIENT)
Dept: NURSING | Facility: CLINIC | Age: 43
End: 2018-12-07

## 2018-12-07 NOTE — TELEPHONE ENCOUNTER
Marlen provided with upcoming appointment time of 12/12 at 11:00 Am. She verbalized understanding.   Patito Cuevas RN/FNA    Additional Information    [1] Follow-up call to recent contact AND [2] information only call, no triage required    Protocols used: INFORMATION ONLY CALL-ADULT-

## 2018-12-12 ENCOUNTER — ONCOLOGY VISIT (OUTPATIENT)
Dept: ONCOLOGY | Facility: CLINIC | Age: 43
End: 2018-12-12
Attending: INTERNAL MEDICINE
Payer: COMMERCIAL

## 2018-12-12 ENCOUNTER — HOSPITAL ENCOUNTER (OUTPATIENT)
Facility: CLINIC | Age: 43
Setting detail: SPECIMEN
Discharge: HOME | End: 2018-12-12
Attending: INTERNAL MEDICINE | Admitting: INTERNAL MEDICINE
Payer: COMMERCIAL

## 2018-12-12 VITALS
HEART RATE: 78 BPM | HEIGHT: 64 IN | OXYGEN SATURATION: 97 % | WEIGHT: 193 LBS | DIASTOLIC BLOOD PRESSURE: 91 MMHG | SYSTOLIC BLOOD PRESSURE: 134 MMHG | RESPIRATION RATE: 16 BRPM | BODY MASS INDEX: 32.95 KG/M2 | TEMPERATURE: 98 F

## 2018-12-12 DIAGNOSIS — N60.92 ATYPICAL LOBULAR HYPERPLASIA (ALH) OF LEFT BREAST: Primary | ICD-10-CM

## 2018-12-12 LAB
ALBUMIN SERPL-MCNC: 3.4 G/DL (ref 3.4–5)
ALP SERPL-CCNC: 70 U/L (ref 40–150)
ALT SERPL W P-5'-P-CCNC: 24 U/L (ref 0–50)
ANION GAP SERPL CALCULATED.3IONS-SCNC: 2 MMOL/L (ref 3–14)
AST SERPL W P-5'-P-CCNC: 18 U/L (ref 0–45)
BILIRUB SERPL-MCNC: 0.5 MG/DL (ref 0.2–1.3)
BUN SERPL-MCNC: 10 MG/DL (ref 7–30)
CALCIUM SERPL-MCNC: 8.4 MG/DL (ref 8.5–10.1)
CHLORIDE SERPL-SCNC: 109 MMOL/L (ref 94–109)
CO2 SERPL-SCNC: 29 MMOL/L (ref 20–32)
CREAT SERPL-MCNC: 0.84 MG/DL (ref 0.52–1.04)
ERYTHROCYTE [DISTWIDTH] IN BLOOD BY AUTOMATED COUNT: 14.2 % (ref 10–15)
GFR SERPL CREATININE-BSD FRML MDRD: 73 ML/MIN/1.7M2
GLUCOSE SERPL-MCNC: 89 MG/DL (ref 70–99)
HCT VFR BLD AUTO: 42.9 % (ref 35–47)
HGB BLD-MCNC: 14.9 G/DL (ref 11.7–15.7)
MCH RBC QN AUTO: 27.7 PG (ref 26.5–33)
MCHC RBC AUTO-ENTMCNC: 34.7 G/DL (ref 31.5–36.5)
MCV RBC AUTO: 80 FL (ref 78–100)
PLATELET # BLD AUTO: 292 10E9/L (ref 150–450)
POTASSIUM SERPL-SCNC: 4.1 MMOL/L (ref 3.4–5.3)
PROT SERPL-MCNC: 7.3 G/DL (ref 6.8–8.8)
RBC # BLD AUTO: 5.38 10E12/L (ref 3.8–5.2)
SODIUM SERPL-SCNC: 140 MMOL/L (ref 133–144)
WBC # BLD AUTO: 4.6 10E9/L (ref 4–11)

## 2018-12-12 PROCEDURE — 36415 COLL VENOUS BLD VENIPUNCTURE: CPT

## 2018-12-12 PROCEDURE — 85027 COMPLETE CBC AUTOMATED: CPT | Performed by: INTERNAL MEDICINE

## 2018-12-12 PROCEDURE — 99205 OFFICE O/P NEW HI 60 MIN: CPT | Performed by: INTERNAL MEDICINE

## 2018-12-12 PROCEDURE — G0463 HOSPITAL OUTPT CLINIC VISIT: HCPCS

## 2018-12-12 PROCEDURE — 80053 COMPREHEN METABOLIC PANEL: CPT | Performed by: INTERNAL MEDICINE

## 2018-12-12 RX ORDER — TAMOXIFEN CITRATE 20 MG/1
20 TABLET ORAL DAILY
Qty: 90 TABLET | Refills: 3 | Status: SHIPPED | OUTPATIENT
Start: 2018-12-12 | End: 2019-05-22

## 2018-12-12 ASSESSMENT — MIFFLIN-ST. JEOR: SCORE: 1505.44

## 2018-12-12 ASSESSMENT — PAIN SCALES - GENERAL: PAINLEVEL: NO PAIN (0)

## 2018-12-12 NOTE — NURSING NOTE
"Oncology Rooming Note    December 12, 2018 10:57 AM   Marlen Solano is a 43 year old female who presents for:    Chief Complaint   Patient presents with     Oncology Clinic Visit     New Patient Consult     Initial Vitals: BP (!) 134/91   Pulse 78   Temp 98  F (36.7  C) (Tympanic)   Resp 16   Ht 1.626 m (5' 4\")   Wt 87.5 kg (193 lb)   SpO2 97%   BMI 33.13 kg/m   Estimated body mass index is 33.13 kg/m  as calculated from the following:    Height as of this encounter: 1.626 m (5' 4\").    Weight as of this encounter: 87.5 kg (193 lb). Body surface area is 1.99 meters squared.  No Pain (0) Comment: Data Unavailable   No LMP recorded. Patient is postmenopausal.  Allergies reviewed: Yes  Medications reviewed: Yes    Medications: Medication refills not needed today.  Pharmacy name entered into EPIC:    [facility] [address] [facility] [address] [place] [address] [address] [facility] [address] [facility] & [address] [facility] [address]  [place] [facility] [address] [facility]    Clinical concerns: New Patient Consult    8 minutes for nursing intake (face to face time)     Karo Bermeo CMA            DISCHARGE PLAN:  Next appointments: See patient instruction section  Departure Mode: Ambulatory  Accompanied by: self  0 minutes for nursing discharge (face to face time)   Karo Bermeo CMA      "

## 2018-12-12 NOTE — LETTER
12/12/2018         RE: Marlen Solano  [address] [place] [address]        Dear Colleague,    Thank you for referring your patient, Marlen Solano, to the [facility]. Please see a copy of my visit note below.    Visit Date:   12/12/2018      HISTORY OF PRESENT ILLNESS:  Marlen Solano is a 43-year-old postmenopausal patient who has been referred today by Dr. Fernandez for a medical oncology consultation for a new diagnosis of atypical lobular hyperplasia.      HISTORY OF PRESENTING COMPLAINT.  Marlen is a 43-year-old postmenopausal patient who went for routine imaging with a mammogram in 08/2018.  She was having some pain in her left breast at the time of the imaging.  She had a nodule in the upper medial aspect of the left breast and some vague asymmetry in the lower breast, but there was also a well-circumscribed nodule in the region of the right breast, retroareolar.  She had an ultrasound done to follow up with this mammogram.  There was nothing seen in the lower left breast, but on the right side, there was a well-circumscribed nodule measuring 2.5 cm.  A biopsy was recommended.  There was also some skin thickening in the left upper outer breast, thought to be benign.  The patient went on to have an ultrasound core biopsy of the right breast lump, also in 08/2018.  Pathology on the right revealed atypical lobular hyperplasia and a surgical consultation was recommended.  The patient saw Dr. Fernandez and had her left breast skin lesion removed and also a right breast biopsy.  The left breast skin lesion was benign keloid with ossification.  The right breast biopsy, however, showed a complex epithelial lesion with focal atypical lobular hyperplasia.  Because of the focal atypical lobular hyperplasia, she has been referred here to discuss her risk of breast cancer and what we can do to lower the risk.      PAST MEDICAL HISTORY:  Essentially unremarkable.      FAMILY  HISTORY:  No history of breast cancer.  No history of ovarian cancer.  No history of colon cancer.      SOCIAL HISTORY:  The patient is .  She has got 4 children, 2 boys and 2 girls.  Some of her children suffer from asthma.  The patient is a nonsmoker, does not drink alcohol.  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  The patient herself has 4 brothers and 3 sisters, none of whom have had cancer.      MEDICATIONS AND ALLERGIES:  Outlined in the nursing records.      REVIEW OF SYSTEMS:  A 14-point comprehensive review of systems has been done with her today.  Overall, she denies any respiratory, cardiovascular, genitourinary, gastrointestinal, neurological or musculoskeletal symptoms that are worrisome.      PHYSICAL EXAMINATION:  She is a well-appearing lady in no acute distress.   VITAL SIGNS:  Blood pressure is 134/91, pulse 78, respiratory rate 16, temperature 98.  Her weight is 193 pounds.     HEENT:  Oropharynx clear, mucous membranes moist.   NECK:  No masses or goiter.  There is no cervical, supraclavicular, infraclavicular adenopathy.   CHEST:  Clear to auscultation and percussion bilaterally.  Heart sounds 1, 2 normal.  No added sounds or murmurs.  Right breast, status post a right-sided lumpectomy in the retroareolar area of the right breast.  Right axilla negative.  Left breast is status post removal of a left breast skin lesion which turned out to be a keloid.  No palpable masses on the left side.  Left axilla negative.      DATA REVIEW:  I have personally reviewed today her imaging and also her pathology as well as her surgical records.      IMPRESSION:  This is a 43-year-old postmenopausal patient with a diagnosis of atypical lobular hyperplasia of the right breast.  She is status post right breast biopsy.  I have discussed with her the meaning of atypical lobular hyperplasia and we have discussed that it is a risk factor for the development of  breast cancer and as a result, we have techniques where we try to lower one's risk of breast cancer.  We have discussed our close observation track which would be an incorporation of monthly breast self exams, imaging potentially every 6 months.  I would like her to have a mammogram done for postsurgical followup in 2019.  She does not have particularly dense breast tissue so we may get away with doing a 3D mammogram on her for the future, instead of doing MRI scans.  We then went on to discuss chemo prevention and we have discussed preventive drugs such as tamoxifen and also aromatase inhibitors.  We discussed both drugs in detail with their side effect profiles.  After discussion, we have discussed the benefit of these drugs which would reduce her risk of breast cancer by about 45% -50%.  At the end of our discussion, we have decided to start her on tamoxifen 20 mg p.o. daily.  She is aware of all the risks, benefits and side effects.  She is to call me if she is having any problems with the tamoxifen.  Otherwise, I would see her back in about 4-6 months to assess toxicity and she will be due for a bilateral diagnostic mammogram in 2019 to follow up post-surgically.  Based on that mammogram, I will decide what further imaging, she might need.  All of the above has been discussed at length with her today.      Consultation time today has been 1 hour and 10 minutes, the majority of time was spent on counseling and direction of patient care.         KERRIE FRANKS MD             D: 2018   T: 2018   MT: SERGO      Name:     SURAJ ANAND   MRN:      [id number]        Account:      [id number]   :      1975           Visit Date:   2018      Document: [id number]       Again, thank you for allowing me to participate in the care of your patient.        Sincerely,        Kerrie Franks MD

## 2018-12-13 NOTE — PROGRESS NOTES
Visit Date:   12/12/2018      HISTORY OF PRESENT ILLNESS:  Marlen Solano is a 43-year-old postmenopausal patient who has been referred today by Dr. Fernandez for a medical oncology consultation for a new diagnosis of atypical lobular hyperplasia.      HISTORY OF PRESENTING COMPLAINT.  Marlen is a 43-year-old postmenopausal patient who went for routine imaging with a mammogram in 08/2018.  She was having some pain in her left breast at the time of the imaging.  She had a nodule in the upper medial aspect of the left breast and some vague asymmetry in the lower breast, but there was also a well-circumscribed nodule in the region of the right breast, retroareolar.  She had an ultrasound done to follow up with this mammogram.  There was nothing seen in the lower left breast, but on the right side, there was a well-circumscribed nodule measuring 2.5 cm.  A biopsy was recommended.  There was also some skin thickening in the left upper outer breast, thought to be benign.  The patient went on to have an ultrasound core biopsy of the right breast lump, also in 08/2018.  Pathology on the right revealed atypical lobular hyperplasia and a surgical consultation was recommended.  The patient saw Dr. Fernandez and had her left breast skin lesion removed and also a right breast biopsy.  The left breast skin lesion was benign keloid with ossification.  The right breast biopsy, however, showed a complex epithelial lesion with focal atypical lobular hyperplasia.  Because of the focal atypical lobular hyperplasia, she has been referred here to discuss her risk of breast cancer and what we can do to lower the risk.      PAST MEDICAL HISTORY:  Essentially unremarkable.      FAMILY HISTORY:  No history of breast cancer.  No history of ovarian cancer.  No history of colon cancer.      SOCIAL HISTORY:  The patient is .  She has got 4 children, 2 boys and 2 girls.  Some of her children suffer from asthma.  The patient is a nonsmoker, does  not drink alcohol.  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  The patient herself has 4 brothers and 3 sisters, none of whom have had cancer.      MEDICATIONS AND ALLERGIES:  Outlined in the nursing records.      REVIEW OF SYSTEMS:  A 14-point comprehensive review of systems has been done with her today.  Overall, she denies any respiratory, cardiovascular, genitourinary, gastrointestinal, neurological or musculoskeletal symptoms that are worrisome.      PHYSICAL EXAMINATION:  She is a well-appearing lady in no acute distress.   VITAL SIGNS:  Blood pressure is 134/91, pulse 78, respiratory rate 16, temperature 98.  Her weight is 193 pounds.     HEENT:  Oropharynx clear, mucous membranes moist.   NECK:  No masses or goiter.  There is no cervical, supraclavicular, infraclavicular adenopathy.   CHEST:  Clear to auscultation and percussion bilaterally.  Heart sounds 1, 2 normal.  No added sounds or murmurs.  Right breast, status post a right-sided lumpectomy in the retroareolar area of the right breast.  Right axilla negative.  Left breast is status post removal of a left breast skin lesion which turned out to be a keloid.  No palpable masses on the left side.  Left axilla negative.      DATA REVIEW:  I have personally reviewed today her imaging and also her pathology as well as her surgical records.      IMPRESSION:  This is a 43-year-old postmenopausal patient with a diagnosis of atypical lobular hyperplasia of the right breast.  She is status post right breast biopsy.  I have discussed with her the meaning of atypical lobular hyperplasia and we have discussed that it is a risk factor for the development of breast cancer and as a result, we have techniques where we try to lower one's risk of breast cancer.  We have discussed our close observation track which would be an incorporation of monthly breast self exams, imaging potentially every 6 months.  I would like her  to have a mammogram done for postsurgical followup in 2019.  She does not have particularly dense breast tissue so we may get away with doing a 3D mammogram on her for the future, instead of doing MRI scans.  We then went on to discuss chemo prevention and we have discussed preventive drugs such as tamoxifen and also aromatase inhibitors.  We discussed both drugs in detail with their side effect profiles.  After discussion, we have discussed the benefit of these drugs which would reduce her risk of breast cancer by about 45% -50%.  At the end of our discussion, we have decided to start her on tamoxifen 20 mg p.o. daily.  She is aware of all the risks, benefits and side effects.  She is to call me if she is having any problems with the tamoxifen.  Otherwise, I would see her back in about 4-6 months to assess toxicity and she will be due for a bilateral diagnostic mammogram in 2019 to follow up post-surgically.  Based on that mammogram, I will decide what further imaging, she might need.  All of the above has been discussed at length with her today.      Consultation time today has been 1 hour and 10 minutes, the majority of time was spent on counseling and direction of patient care.         KERRIE PACHECO MD             D: 2018   T: 2018   MT: SERGO      Name:     SURAJ ANAND   MRN:      [id number]        Account:      [id number]   :      1975           Visit Date:   2018      Document: [id number]

## 2018-12-17 ENCOUNTER — TELEPHONE (OUTPATIENT)
Dept: SURGERY | Facility: CLINIC | Age: 43
End: 2018-12-17

## 2018-12-17 NOTE — TELEPHONE ENCOUNTER
GENERAL SURGERY NURSE PHONE TRIAGE   Marlen MIMS Kpayedo    MRN# [id number]  AGE:  43 year old  YOB: 1975  [phone] (home)   Surgeon: Dr. Fernandez  Surgical Assist:  Corie Rogers PA-C     Surgery type: right breast seed localized biopsy with excision left breast skin lesion     Surgery Date: October / 19 / 2018     POD: y 2 months ago     CHIEF CONCERN:  Open draining incision     HISTORY OF PRESENT ILLNESS:   Patient calling concerned, right biopsy open and draining.  Had discomfort that became acute pain this past Friday 12/14/18.  Spontaneous drainage today, with pain relief.    PLAN:   Clinic appointment with clinic [facility] tomorrow am.  Pt is in agreement with this plan.  Rita Schreiber RN on 12/17/2018 at 3:35 PM

## 2018-12-18 ENCOUNTER — OFFICE VISIT (OUTPATIENT)
Dept: SURGERY | Facility: CLINIC | Age: 43
End: 2018-12-18
Payer: COMMERCIAL

## 2018-12-18 VITALS
RESPIRATION RATE: 16 BRPM | OXYGEN SATURATION: 98 % | HEART RATE: 77 BPM | SYSTOLIC BLOOD PRESSURE: 118 MMHG | WEIGHT: 193 LBS | BODY MASS INDEX: 32.95 KG/M2 | DIASTOLIC BLOOD PRESSURE: 68 MMHG | HEIGHT: 64 IN

## 2018-12-18 DIAGNOSIS — Z09 FOLLOW-UP EXAMINATION FOLLOWING SURGERY: Primary | ICD-10-CM

## 2018-12-18 PROCEDURE — 99024 POSTOP FOLLOW-UP VISIT: CPT | Performed by: PHYSICIAN ASSISTANT

## 2018-12-18 ASSESSMENT — MIFFLIN-ST. JEOR: SCORE: 1505.44

## 2018-12-18 NOTE — PROGRESS NOTES
"12/18/2018    [facility] Note     Subjective:  Marlen Solano is here for evaluation of \"opened incision\". She underwent right seed-localized breast biopsy and excision of left breast keloid lesion by Dr. Fernandez on 10/19/2018. Today she  tells me she has been feeling well since surgery. She was seen by Dr. Fernandez for postop visit on 11/19/2018 and was doing well.  Marlen reports noticing 3 days ago that her left breast incision appears opened.  She reports persistent discomfort at the incision since surgery but this has not worsened.  She denies redness or drainage.      Objective:  Right breast incision - fully healed  Left breast incision - fully healed, no drainage.  Scar is approximately 0.5 cm wide.  Of note, this incision lies at junction of left breast and chest wall where bra underwire rests.    Assessment:  S/p right seed-localized breast biopsy and excision of left breast keloid lesion.  Keloid scar with mild persistent discomfort    Plan:  Explained to patient that her incision is well-healed without signs of infection. Surgical site discomfort likely related to its location as bra likely rubs on the area.  RTC PRN      Yessenia Galvan PA-C      Please route or send letter to:  Primary Care Provider (PCP)        "

## 2019-01-31 ENCOUNTER — OFFICE VISIT (OUTPATIENT)
Dept: SURGERY | Facility: CLINIC | Age: 44
End: 2019-01-31
Payer: COMMERCIAL

## 2019-01-31 VITALS — HEIGHT: 64 IN | BODY MASS INDEX: 32.95 KG/M2 | WEIGHT: 193 LBS | RESPIRATION RATE: 16 BRPM

## 2019-01-31 DIAGNOSIS — Z09 SURGICAL FOLLOWUP VISIT: Primary | ICD-10-CM

## 2019-01-31 PROCEDURE — 99212 OFFICE O/P EST SF 10 MIN: CPT | Performed by: SURGERY

## 2019-01-31 ASSESSMENT — MIFFLIN-ST. JEOR: SCORE: 1505.44

## 2019-01-31 NOTE — PROGRESS NOTES
She returns today to discuss her incision.  She notes pain in her left breast incision when she lies on her left side in bed.  She also notes some widening.  She had a significant keloid at this site prior to her excisional biopsy.  Is concerned that a keloid is recurring.    Exam: The incision appears to be healing without any sign of complications such as infection hematoma or seroma.  There is hypertrophic scar along the majority of the length of the incision it measures about 8 mm at this time.  It remains mildly hyperemic.    Impression: developing keloid, continued increased blood flow/metabolism at the scar-likely the cause of her persistent discomfort    Plan: She will try Silastic sheeting over-the-counter.  I will see if we can get some scar cream for her as well.  I will also discussed the potential for steroid injection.  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    Hosea Fernandez MD  1/31/2019 3:57 PM    Please route or send letter to:  Include Progress Note

## 2019-04-24 ENCOUNTER — HOSPITAL ENCOUNTER (OUTPATIENT)
Dept: MAMMOGRAPHY | Facility: CLINIC | Age: 44
Discharge: HOME | End: 2019-04-24
Attending: INTERNAL MEDICINE | Admitting: INTERNAL MEDICINE
Payer: COMMERCIAL

## 2019-04-24 DIAGNOSIS — N60.92 ATYPICAL LOBULAR HYPERPLASIA (ALH) OF LEFT BREAST: ICD-10-CM

## 2019-04-24 PROCEDURE — 77066 DX MAMMO INCL CAD BI: CPT

## 2019-04-24 PROCEDURE — G0279 TOMOSYNTHESIS, MAMMO: HCPCS

## 2019-05-22 ENCOUNTER — ONCOLOGY VISIT (OUTPATIENT)
Dept: ONCOLOGY | Facility: CLINIC | Age: 44
End: 2019-05-22
Attending: INTERNAL MEDICINE
Payer: COMMERCIAL

## 2019-05-22 ENCOUNTER — HOSPITAL ENCOUNTER (OUTPATIENT)
Facility: CLINIC | Age: 44
Setting detail: SPECIMEN
End: 2019-05-22
Attending: INTERNAL MEDICINE
Payer: COMMERCIAL

## 2019-05-22 VITALS
BODY MASS INDEX: 33.46 KG/M2 | RESPIRATION RATE: 16 BRPM | SYSTOLIC BLOOD PRESSURE: 140 MMHG | OXYGEN SATURATION: 100 % | HEART RATE: 77 BPM | WEIGHT: 196 LBS | TEMPERATURE: 97.2 F | DIASTOLIC BLOOD PRESSURE: 87 MMHG | HEIGHT: 64 IN

## 2019-05-22 DIAGNOSIS — N60.92 ATYPICAL LOBULAR HYPERPLASIA (ALH) OF LEFT BREAST: ICD-10-CM

## 2019-05-22 PROCEDURE — 99213 OFFICE O/P EST LOW 20 MIN: CPT | Performed by: INTERNAL MEDICINE

## 2019-05-22 RX ORDER — TAMOXIFEN CITRATE 20 MG/1
20 TABLET ORAL DAILY
Qty: 90 TABLET | Refills: 3 | Status: SHIPPED | OUTPATIENT
Start: 2019-05-22 | End: 2019-12-03

## 2019-05-22 ASSESSMENT — PAIN SCALES - GENERAL: PAINLEVEL: NO PAIN (0)

## 2019-05-22 ASSESSMENT — MIFFLIN-ST. JEOR: SCORE: 1514.05

## 2019-05-22 NOTE — LETTER
5/22/2019         RE: Marlen Solano  [address] [place] [address]        Dear Colleague,    Thank you for referring your patient, Marlen Solano, to the [facility]. Please see a copy of my visit note below.    Visit Date:   05/22/2019      HISTORY OF PRESENT ILLNESS:  Marlen Solano is a 44-year-old postmenopausal patient with a diagnosis of atypical lobular hyperplasia of the right breast, status post right breast lumpectomy.  She is currently on tamoxifen for prevention of breast cancer, comes in today for interim followup.  She is feeling good on the tamoxifen.  She does get some leg cramps, so I have given her some hints on how to alleviate those leg cramps.  We have been following her mammograms, because there was a lesion on the left side, and she recently had a mammogram done on 04/24/2019, which was done with a 3D.  She has scattered fibroglandular densities on the left side, she has a stable nodular density in the upper medial portion of the left breast.  It was stable, and a mammogram was recommended again in 1 year.  There was nothing suspicious seen.  I have discussed the mammogram results with her today.  She is up-to-date on lab work, so I do not need lab work today.  She has no other complaints today, apart from leg cramps on the tamoxifen.  She denies cough, shortness of breath, bone pain, any worrisome symptomatology.      REVIEW OF SYSTEMS:  A 14-point comprehensive review of systems is otherwise unremarkable.      FAMILY AND SOCIAL HISTORY:  As of my record of 12/2018.      MEDICATIONS AND ALLERGIES:  Outlined in the nursing records.  I have refilled tamoxifen today.      PHYSICAL EXAMINATION:   GENERAL:  She is a well-appearing lady in no acute distress.   VITAL SIGNS:  Stable.   HEENT:  Oropharynx clear, mucous membranes moist.   NECK:  Has no masses or goiter.   LYMPH:  There is no cervical, supraclavicular or infraclavicular adenopathy.    CHEST:  Clear to auscultation and percussion bilaterally.   HEART:  S1, S2 normal.  No added sounds or murmurs.   BREASTS:  The patient is status post right-sided lumpectomy, the scar looks good.  Right axilla negative.  The patient had a skin lesion removed from the left breast.  She has no palpable masses on the left side.  Left axilla negative.      IMPRESSION:  A 44-year-old patient with a diagnosis of atypical lobular hyperplasia of the right breast, currently on tamoxifen for prevention.  I have reviewed a mammogram today which has a stable density on the left side and there was a recommended mammogram for 1 years' time.  She has no other worrisome symptomatology.  I have started her on some tonic water for leg cramps from the tamoxifen.  I will see her back in my clinic in 6 months.         KERREI RFANKS MD             D: 2019   T: 2019   MT: SL      Name:     SURAJ ANAND   MRN:      [id number]        Account:      [id number]   :      1975           Visit Date:   2019      Document: [id number]       Again, thank you for allowing me to participate in the care of your patient.        Sincerely,        Kerrie Franks MD

## 2019-05-22 NOTE — NURSING NOTE
"Oncology Rooming Note    May 22, 2019 3:45 PM   Marlen Solano is a 44 year old female who presents for:    Chief Complaint   Patient presents with     Oncology Clinic Visit     Atypical lobular hyperplasia (ALH) of left breast      Initial Vitals: /87   Pulse 77   Temp 97.2  F (36.2  C) (Tympanic)   Resp 16   Ht 1.626 m (5' 4\")   Wt 88.9 kg (196 lb)   SpO2 100%   BMI 33.64 kg/m   Estimated body mass index is 33.64 kg/m  as calculated from the following:    Height as of this encounter: 1.626 m (5' 4\").    Weight as of this encounter: 88.9 kg (196 lb). Body surface area is 2 meters squared.  No Pain (0) Comment: Data Unavailable   No LMP recorded. Patient is postmenopausal.  Allergies reviewed: Yes  Medications reviewed: Yes    Medications: Medication refills not needed today.  Pharmacy name entered into EPIC:    [facility] [place], [place] - [address] [facility] [address] [place] [address] - [address] [facility] [address] [facility] [address] [facility] [place], [place] - [address]  [facility] [place] - [address] [facility]    Clinical concerns: follow up       Venice Cheatham CMA              "

## 2019-05-23 NOTE — PROGRESS NOTES
Visit Date:   05/22/2019      HISTORY OF PRESENT ILLNESS:  Marlen Solano is a 44-year-old postmenopausal patient with a diagnosis of atypical lobular hyperplasia of the right breast, status post right breast lumpectomy.  She is currently on tamoxifen for prevention of breast cancer, comes in today for interim followup.  She is feeling good on the tamoxifen.  She does get some leg cramps, so I have given her some hints on how to alleviate those leg cramps.  We have been following her mammograms, because there was a lesion on the left side, and she recently had a mammogram done on 04/24/2019, which was done with a 3D.  She has scattered fibroglandular densities on the left side, she has a stable nodular density in the upper medial portion of the left breast.  It was stable, and a mammogram was recommended again in 1 year.  There was nothing suspicious seen.  I have discussed the mammogram results with her today.  She is up-to-date on lab work, so I do not need lab work today.  She has no other complaints today, apart from leg cramps on the tamoxifen.  She denies cough, shortness of breath, bone pain, any worrisome symptomatology.      REVIEW OF SYSTEMS:  A 14-point comprehensive review of systems is otherwise unremarkable.      FAMILY AND SOCIAL HISTORY:  As of my record of 12/2018.      MEDICATIONS AND ALLERGIES:  Outlined in the nursing records.  I have refilled tamoxifen today.      PHYSICAL EXAMINATION:   GENERAL:  She is a well-appearing lady in no acute distress.   VITAL SIGNS:  Stable.   HEENT:  Oropharynx clear, mucous membranes moist.   NECK:  Has no masses or goiter.   LYMPH:  There is no cervical, supraclavicular or infraclavicular adenopathy.   CHEST:  Clear to auscultation and percussion bilaterally.   HEART:  S1, S2 normal.  No added sounds or murmurs.   BREASTS:  The patient is status post right-sided lumpectomy, the scar looks good.  Right axilla negative.  The patient had a skin lesion removed from  the left breast.  She has no palpable masses on the left side.  Left axilla negative.      IMPRESSION:  A 44-year-old patient with a diagnosis of atypical lobular hyperplasia of the right breast, currently on tamoxifen for prevention.  I have reviewed a mammogram today which has a stable density on the left side and there was a recommended mammogram for 1 years' time.  She has no other worrisome symptomatology.  I have started her on some tonic water for leg cramps from the tamoxifen.  I will see her back in my clinic in 6 months.         KERRIE PACHECO MD             D: 2019   T: 2019   MT: MARIA C      Name:     SURAJ ANAND   MRN:      [id number]        Account:      [id number]   :      1975           Visit Date:   2019      Document: [id number]

## 2019-10-30 ENCOUNTER — OFFICE VISIT (OUTPATIENT)
Dept: INTERNAL MEDICINE | Facility: CLINIC | Age: 44
End: 2019-10-30
Payer: COMMERCIAL

## 2019-10-30 VITALS
OXYGEN SATURATION: 98 % | DIASTOLIC BLOOD PRESSURE: 80 MMHG | TEMPERATURE: 98.3 F | HEART RATE: 80 BPM | BODY MASS INDEX: 34.6 KG/M2 | RESPIRATION RATE: 20 BRPM | SYSTOLIC BLOOD PRESSURE: 120 MMHG | HEIGHT: 64 IN | WEIGHT: 202.7 LBS

## 2019-10-30 DIAGNOSIS — M79.672 HEEL PAIN, BILATERAL: Primary | ICD-10-CM

## 2019-10-30 DIAGNOSIS — M79.671 HEEL PAIN, BILATERAL: Primary | ICD-10-CM

## 2019-10-30 PROCEDURE — 99213 OFFICE O/P EST LOW 20 MIN: CPT | Performed by: FAMILY MEDICINE

## 2019-10-30 ASSESSMENT — MIFFLIN-ST. JEOR: SCORE: 1544.44

## 2019-10-30 NOTE — PATIENT INSTRUCTIONS
Stretching and massage, as discussed.    Ibuprofen x 7 days, only as discussed.    Wear appropriate shoes, with good arch support.    Avoid walking barefoot.    Use inserts or orthotics, as discussed.    Follow-up with Podiatry if worsening symptoms or not improving over the next 1-2 weeks.

## 2019-10-30 NOTE — PROGRESS NOTES
SUBJECTIVE:   Marlen Solano is a 44 year old female presenting with a chief complaint of   Chief Complaint   Patient presents with     Foot Pain     Bilateral heel pain with weight bearing first thing in the morning for 2 weeks. Denies injury.       She is an established patient of [facility].    HPI: The patient is a 44-year-old female, who works as an .  Patient spends a lot of time on her feet at work, due to her job.  Patient has experienced bilateral heel pain the past 2 weeks.  Pain is 0/10 at rest, but it is severe for 5 minutes after prolonged sitting or getting out of bed in the morning. No history of trauma/injury, erythema, edema, paresthesias, or weakness.  Patient has not tried any interventions. Patient was hoping to see a Podiatry specialist today, but she was scheduled in primary care.    Patient denies having foot or heel problems previously.    Patient presents 13 minutes late for her appointment today.     Review of Systems   No fever, chills, nausea, or vomiting.  No chest pain, shortness of breath, or abdominal pain.    Patient Active Problem List   Diagnosis     CARDIOVASCULAR SCREENING; LDL GOAL LESS THAN 130     Lump of right breast       Past Medical History:   Diagnosis Date     Lump of right breast        No Known Allergies    Family History   Problem Relation Age of Onset     Family History Negative Mother      Cerebrovascular Disease Father      Hypertension Father      Hypertension Brother      Diabetes Paternal Grandfather        Current Outpatient Medications   Medication Sig Dispense Refill     tamoxifen (NOLVADEX) 20 MG tablet Take 1 tablet (20 mg) by mouth daily 90 tablet 3       Social History     Tobacco Use     Smoking status: Never Smoker     Smokeless tobacco: Never Used   Substance Use Topics     Alcohol use: No     Alcohol/week: 0.0 standard drinks       OBJECTIVE  /80 (BP Location: Right arm, Patient Position: Chair, Cuff Size: Adult Large)    "Pulse 80   Temp 98.3  F (36.8  C) (Oral)   Resp 20   Ht 1.626 m (5' 4\")   Wt 91.9 kg (202 lb 11.2 oz)   SpO2 98%   Breastfeeding? No   BMI 34.79 kg/m      Physical Exam    GENERAL APPEARANCE:  Awake, alert, and in no acute distress.  PSYCHIATRIC:  Pleasant affect.  HEENT:  Sclera anicteric.  No conjunctivitis.    NECK:  Spontaneous full range of motion.    HEART:  Normal S1, S2.  Regular rate and rhythm.  No murmurs, rubs, or gallops.  LUNGS:  No respiratory distress.  No wheezes, rales, or rhonchi.  EXTREMITIES:  Moves 4 extremities symmetrically.   Patient has mild to moderate tenderness to palpation noted involving her bilateral heels and posterior plantar fascia.  The patient's feet, ankles, and lower legs are otherwise not tender to palpation.  No gross abnormalities, joint synovitis, or ecchymosis.  Achilles tendons palpate intact.  Anterior drawers (ankles) negative bilaterally.  Distal pulses are intact, with capillary refill less than 3 seconds.  NEUROLOGIC:  Gait within normal limits.      Labs:  No results found for this or any previous visit (from the past 24 hour(s)).    ASSESSMENT:      ICD-10-CM    1. Heel pain, bilateral M79.671 PODIATRY/FOOT & ANKLE SURGERY REFERRAL    M79.672      Suspect plantar fasciitis, based on history.    PLAN:    Patient declines x-ray studies or further evaluation today, but she is in agreement with the following plan:    Patient Instructions     Stretching and massage, as discussed.    Ibuprofen x 7 days, only as discussed.    Wear appropriate shoes, with good arch support.    Avoid walking barefoot.    Use inserts or orthotics, as discussed.    Follow-up with Podiatry if worsening symptoms or not improving over the next 1-2 weeks.    Discussed risks and benefits of treatment strategies, as noted in the Assessment and Plan sections.    The patient was discharged ambulatory and in stable condition post discussion of follow up.     The above dictation was composed " using Dragon software.    Tanya Rick MD

## 2019-12-03 ENCOUNTER — ONCOLOGY VISIT (OUTPATIENT)
Dept: ONCOLOGY | Facility: CLINIC | Age: 44
End: 2019-12-03
Attending: INTERNAL MEDICINE
Payer: COMMERCIAL

## 2019-12-03 VITALS
WEIGHT: 200.4 LBS | BODY MASS INDEX: 34.4 KG/M2 | TEMPERATURE: 98.6 F | DIASTOLIC BLOOD PRESSURE: 92 MMHG | SYSTOLIC BLOOD PRESSURE: 154 MMHG | OXYGEN SATURATION: 100 % | RESPIRATION RATE: 16 BRPM | HEART RATE: 65 BPM

## 2019-12-03 DIAGNOSIS — N60.91 ATYPICAL LOBULAR HYPERPLASIA (ALH) OF RIGHT BREAST: ICD-10-CM

## 2019-12-03 DIAGNOSIS — N60.92 ATYPICAL LOBULAR HYPERPLASIA (ALH) OF LEFT BREAST: Primary | ICD-10-CM

## 2019-12-03 PROCEDURE — 99214 OFFICE O/P EST MOD 30 MIN: CPT | Performed by: INTERNAL MEDICINE

## 2019-12-03 PROCEDURE — G0463 HOSPITAL OUTPT CLINIC VISIT: HCPCS

## 2019-12-03 RX ORDER — TAMOXIFEN CITRATE 20 MG/1
20 TABLET ORAL DAILY
Qty: 90 TABLET | Refills: 3 | Status: SHIPPED | OUTPATIENT
Start: 2019-12-03 | End: 2020-06-11

## 2019-12-03 ASSESSMENT — PAIN SCALES - GENERAL: PAINLEVEL: NO PAIN (0)

## 2019-12-03 NOTE — NURSING NOTE
"Oncology Rooming Note    December 3, 2019 2:31 PM   Marlen Solano is a 44 year old female who presents for:    Chief Complaint   Patient presents with     Oncology Clinic Visit     Atypical lobular hyperplasia     Initial Vitals: BP (!) 154/92   Pulse 65   Temp 98.6  F (37  C) (Tympanic)   Resp 16   Wt 90.9 kg (200 lb 6.4 oz)   SpO2 100%   BMI 34.40 kg/m   Estimated body mass index is 34.4 kg/m  as calculated from the following:    Height as of 10/30/19: 1.626 m (5' 4\").    Weight as of this encounter: 90.9 kg (200 lb 6.4 oz). Body surface area is 2.03 meters squared.  No Pain (0) Comment: Data Unavailable   No LMP recorded. Patient is postmenopausal.  Allergies reviewed: Yes  Medications reviewed: Yes    Medications: Medication refills not needed today.  Pharmacy name entered into EPIC:    [facility] [address] [id number] - [place], [place] - [address] [facility] [address] [facility] [address] [facility] [address]  [facility] [place], [place] - [address]  [facility] [address] [facility]    Clinical concerns: Follow Up     Karo Bermeo CMA              "

## 2019-12-03 NOTE — PROGRESS NOTES
Visit Date:   12/03/2019      HISTORY OF PRESENT ILLNESS:  Marlen Solano a 44-year-old patient who comes in today for interim followup.  She has a diagnosis of atypical lobular hyperplasia of the right breast, status post right-sided lumpectomy and is on tamoxifen for prevention.  She comes in today for interim followup.  Tamoxifen is going well.  She gets occasional hot flashes from it and some leg cramps, but overall she is tolerating it quite well.  She is due for a repeat mammogram in 04/2020, so I have ordered that for her today.  She has a stable nodular density in the upper portion of the medial left breast, but she has had nothing suspicious seen on the last few mammograms.  She has got no other complaints today.      REVIEW OF SYSTEMS:  A 14-point comprehensive review of systems is otherwise unremarkable.      MEDICATIONS AND ALLERGIES:  Outlined in the nursing records.      SOCIAL HISTORY:  The patient continues to work full-time in healthcare.  She is a nonsmoker.      PAIN ASSESSMENT:  The patient is in no pain today.      PHYSICAL EXAMINATION:   GENERAL:  She is a well-appearing lady in no acute distress.   VITAL SIGNS:  Stable.   HEENT:  Oropharynx clear, mucous membranes moist.   NECK:  Has no masses or goiter.   NECK:  There is no cervical, supraclavicular or infraclavicular adenopathy.   CHEST:  Clear to auscultation and percussion bilaterally.   HEART:  Sounds 1, 2 normal.  No added sounds or murmurs.   BREASTS:  The patient is status post right-sided lumpectomy.  The scar looks good, no palpable masses on the right side.  Right axilla negative.  Left breast normal, no palpable masses.  The patient had a skin lesion removed from the left side, so she has got a keloid scar there.  Left axilla negative.   LYMPHATIC:  No evidence of lymphedema.   NEUROLOGIC:  The patient is alert and oriented x3.      IMPRESSION:  A 44-year-old patient with a diagnosis of atypical lobular hyperplasia of the right  breast.  She is currently on active treatment with adjuvant tamoxifen.  Tamoxifen seems to be going well for her.  She does have some side effects from it.  We have discussed the side effect profile today.  I have ordered a mammogram for 2020.  Overall, I feel like she is doing well.  She will see me back in 6-9 months.         KERRIE PACHECO MD             D: 2019   T: 2019   MT: JOSÉ LUIS      Name:     SURAJ ANAND   MRN:      [id number]        Account:      [id number]   :      1975           Visit Date:   2019      Document: [id number]

## 2019-12-03 NOTE — LETTER
12/3/2019         RE: Marlen Solano  [address] [place] [address]        Dear Colleague,    Thank you for referring your patient, Marlen Solano, to the [facility]. Please see a copy of my visit note below.    Visit Date:   12/03/2019      HISTORY OF PRESENT ILLNESS:  Marlen Solano a 44-year-old patient who comes in today for interim followup.  She has a diagnosis of atypical lobular hyperplasia of the right breast, status post right-sided lumpectomy and is on tamoxifen for prevention.  She comes in today for interim followup.  Tamoxifen is going well.  She gets occasional hot flashes from it and some leg cramps, but overall she is tolerating it quite well.  She is due for a repeat mammogram in 04/2020, so I have ordered that for her today.  She has a stable nodular density in the upper portion of the medial left breast, but she has had nothing suspicious seen on the last few mammograms.  She has got no other complaints today.      REVIEW OF SYSTEMS:  A 14-point comprehensive review of systems is otherwise unremarkable.      MEDICATIONS AND ALLERGIES:  Outlined in the nursing records.      SOCIAL HISTORY:  The patient continues to work full-time in healthcare.  She is a nonsmoker.      PAIN ASSESSMENT:  The patient is in no pain today.      PHYSICAL EXAMINATION:   GENERAL:  She is a well-appearing lady in no acute distress.   VITAL SIGNS:  Stable.   HEENT:  Oropharynx clear, mucous membranes moist.   NECK:  Has no masses or goiter.   NECK:  There is no cervical, supraclavicular or infraclavicular adenopathy.   CHEST:  Clear to auscultation and percussion bilaterally.   HEART:  Sounds 1, 2 normal.  No added sounds or murmurs.   BREASTS:  The patient is status post right-sided lumpectomy.  The scar looks good, no palpable masses on the right side.  Right axilla negative.  Left breast normal, no palpable masses.  The patient had a skin lesion removed from the left side, so she  has got a keloid scar there.  Left axilla negative.   LYMPHATIC:  No evidence of lymphedema.   NEUROLOGIC:  The patient is alert and oriented x3.      IMPRESSION:  A 44-year-old patient with a diagnosis of atypical lobular hyperplasia of the right breast.  She is currently on active treatment with adjuvant tamoxifen.  Tamoxifen seems to be going well for her.  She does have some side effects from it.  We have discussed the side effect profile today.  I have ordered a mammogram for 2020.  Overall, I feel like she is doing well.  She will see me back in 6-9 months.         KERRIE FRANKS MD             D: 2019   T: 2019   MT: JOSÉ LUIS      Name:     SURAJ ANAND   MRN:      [id number]        Account:      [id number]   :      1975           Visit Date:   2019      Document: [id number]       Again, thank you for allowing me to participate in the care of your patient.        Sincerely,        Kerrie Franks MD

## 2019-12-26 ENCOUNTER — OFFICE VISIT (OUTPATIENT)
Dept: INTERNAL MEDICINE | Facility: CLINIC | Age: 44
End: 2019-12-26
Payer: COMMERCIAL

## 2019-12-26 VITALS
BODY MASS INDEX: 34.28 KG/M2 | HEART RATE: 93 BPM | RESPIRATION RATE: 20 BRPM | DIASTOLIC BLOOD PRESSURE: 62 MMHG | WEIGHT: 200.8 LBS | OXYGEN SATURATION: 100 % | HEIGHT: 64 IN | SYSTOLIC BLOOD PRESSURE: 124 MMHG

## 2019-12-26 DIAGNOSIS — M79.671 PAIN OF BOTH HEELS: ICD-10-CM

## 2019-12-26 DIAGNOSIS — M62.838 MUSCLE SPASM: ICD-10-CM

## 2019-12-26 DIAGNOSIS — M79.672 PAIN OF BOTH HEELS: ICD-10-CM

## 2019-12-26 DIAGNOSIS — Z00.00 ENCOUNTER FOR ROUTINE ADULT HEALTH EXAMINATION WITHOUT ABNORMAL FINDINGS: Primary | ICD-10-CM

## 2019-12-26 PROCEDURE — 99396 PREV VISIT EST AGE 40-64: CPT | Performed by: INTERNAL MEDICINE

## 2019-12-26 ASSESSMENT — MIFFLIN-ST. JEOR: SCORE: 1535.82

## 2019-12-26 ASSESSMENT — PAIN SCALES - GENERAL: PAINLEVEL: MODERATE PAIN (5)

## 2019-12-26 NOTE — PROGRESS NOTES
SUBJECTIVE:   CC: Marlen Solano is an 44 year old woman who presents for preventive health visit.     Healthy Habits:  Answers for HPI/ROS submitted by the patient on 12/26/2019   Annual Exam:  Frequency of exercise:: None  Getting at least 3 servings of Calcium per day:: Yes  Diet:: Regular (no restrictions)  Taking medications regularly:: Yes  Medication side effects:: None, Muscle aches  Bi-annual eye exam:: NO  Dental care twice a year:: Yes  Sleep apnea or symptoms of sleep apnea:: Excessive snoring  Additional concerns today:: No      She had had an abnormal mammogram, biopsy showed atypical lobular hyperplasia, she is following with oncology and is on tamoxifen and stable.  Mammogram will be done in April.  She reports that she has been having significant pain in both of her plantar heels.  This is been for several months, she has tried doing ice and heel supports without improvement.  She reports she gets some occasional cramps in her thighs, can be one or the other usually on the top of the thighs.  She is not sure if this is related to the heel pain.    Today's PHQ-2 Score:   PHQ-2 ( 1999 [employer]) 12/26/2019 10/11/2018   Q1: Little interest or pleasure in doing things 0 0   Q2: Feeling down, depressed or hopeless 0 0   PHQ-2 Score 0 0   Q1: Little interest or pleasure in doing things Not at all -   Q2: Feeling down, depressed or hopeless Not at all -   PHQ-2 Score 0 -       Abuse: Current or Past(Physical, Sexual or Emotional)- NO  Do you feel safe in your environment? Yes        Social History     Tobacco Use     Smoking status: Never Smoker     Smokeless tobacco: Never Used   Substance Use Topics     Alcohol use: No     Alcohol/week: 0.0 standard drinks     If you drink alcohol do you typically have >3 drinks per day or >7 drinks per week? No                     Reviewed orders with patient.  Reviewed health maintenance and updated orders accordingly - Yes      Alternate mammogram schedule due to  "atypical lobular hyperplasia    Pertinent mammograms are reviewed under the imaging tab.  History of abnormal Pap smear: NO - age 30-65 PAP every 5 years with negative HPV co-testing recommended  PAP / HPV Latest Ref Rng & Units 6/22/2016   PAP - NIL   HPV 16 DNA NEG Negative   HPV 18 DNA NEG Negative   OTHER HR HPV NEG Negative     Reviewed and updated as needed this visit by clinical staff  Tobacco  Allergies  Meds         Reviewed and updated as needed this visit by Provider        Patient Active Problem List   Diagnosis     CARDIOVASCULAR SCREENING; LDL GOAL LESS THAN 130     Atypical lobular hyperplasia (ALH) of right breast     Current Outpatient Medications   Medication Sig Dispense Refill     tamoxifen (NOLVADEX) 20 MG tablet Take 1 tablet (20 mg) by mouth daily 90 tablet 3        ROS:  CONSTITUTIONAL: NEGATIVE for fever, chills, change in weight  INTEGUMENTARU/SKIN: NEGATIVE for worrisome rashes, moles or lesions  EYES: NEGATIVE for vision changes or irritation  ENT: NEGATIVE for ear, mouth and throat problems  RESP: NEGATIVE for significant cough or SOB  BREAST: NEGATIVE for masses, tenderness or discharge  CV: NEGATIVE for chest pain, palpitations or peripheral edema  GI: NEGATIVE for nausea, abdominal pain, heartburn, or change in bowel habits  : NEGATIVE for unusual urinary or vaginal symptoms. Periods are regular.  MUSCULOSKELETAL: NEGATIVE for significant arthralgias or myalgia  NEURO: NEGATIVE for weakness, dizziness or paresthesias  PSYCHIATRIC: NEGATIVE for changes in mood or affect    OBJECTIVE:   /62 (BP Location: Left arm, Patient Position: Sitting, Cuff Size: Adult Large)   Pulse 93   Resp 20   Ht 1.626 m (5' 4\")   Wt 91.1 kg (200 lb 12.8 oz)   SpO2 100%   Breastfeeding No   BMI 34.47 kg/m    EXAM:    Patient alert, in no acute distress  /62 (BP Location: Left arm, Patient Position: Sitting, Cuff Size: Adult Large)   Pulse 93   Resp 20   Ht 1.626 m (5' 4\")   Wt 91.1 " "kg (200 lb 12.8 oz)   SpO2 100%   Breastfeeding No   BMI 34.47 kg/m      HEENT: extraocular movements are intact, pupils equal and reactive to light and accommodation, TMs clear, oropharynx clear  NECK: Neck supple. No adenopathy. Thyroid symmetric, normal size,, Carotids without bruits.  PULMONARY: clear to auscultation  CARDIAC: regular rate and rhythm and no murmurs, clicks, or gallops  PULSES: 2/2 throughout  BACK: no spinal or CVAT  ABDOMINAL: Soft, nontender.  Normal bowel sounds.  No hepatosplenomegaly or abnormal masses  BREAST: done by oncology  PELVIC: external genitalia normal,, bimanual exam with normal uterus and adnexa, rectovaginal exam unremarkable  REFLEXES: 2+ throughout         ASSESSMENT/PLAN:   1. Encounter for routine adult health examination without abnormal findings  Up to date    2. Pain of both heels  Refer podiatry  - ORTHO  REFERRAL    3.  Muscle spasms: Is possible this is related to walking differently because of her feel pain, given handouts that show some different ways to try to stretch the quadriceps muscles.    COUNSELING:   Reviewed preventive health counseling, as reflected in patient instructions    Estimated body mass index is 34.47 kg/m  as calculated from the following:    Height as of this encounter: 1.626 m (5' 4\").    Weight as of this encounter: 91.1 kg (200 lb 12.8 oz).    Weight management plan: Discussed healthy diet and exercise guidelines     reports that she has never smoked. She has never used smokeless tobacco.      Counseling Resources:  ATP IV Guidelines  Pooled Cohorts Equation Calculator  Breast Cancer Risk Calculator  FRAX Risk Assessment  ICSI Preventive Guidelines  Dietary Guidelines for Americans, 2010  USDA's MyPlate  ASA Prophylaxis  Lung CA Screening    Mary Ellen White MD  [facility]  "

## 2020-01-10 ENCOUNTER — OFFICE VISIT (OUTPATIENT)
Dept: PODIATRY | Facility: CLINIC | Age: 45
End: 2020-01-10
Payer: COMMERCIAL

## 2020-01-10 VITALS
WEIGHT: 200 LBS | SYSTOLIC BLOOD PRESSURE: 126 MMHG | BODY MASS INDEX: 34.15 KG/M2 | HEIGHT: 64 IN | DIASTOLIC BLOOD PRESSURE: 62 MMHG

## 2020-01-10 DIAGNOSIS — M79.672 FOOT PAIN, BILATERAL: Primary | ICD-10-CM

## 2020-01-10 DIAGNOSIS — M21.42 PES PLANUS OF BOTH FEET: ICD-10-CM

## 2020-01-10 DIAGNOSIS — M72.2 PLANTAR FASCIITIS, BILATERAL: ICD-10-CM

## 2020-01-10 DIAGNOSIS — M79.671 FOOT PAIN, BILATERAL: Primary | ICD-10-CM

## 2020-01-10 DIAGNOSIS — M21.41 PES PLANUS OF BOTH FEET: ICD-10-CM

## 2020-01-10 PROCEDURE — 99203 OFFICE O/P NEW LOW 30 MIN: CPT | Performed by: PODIATRIST

## 2020-01-10 RX ORDER — DEXAMETHASONE SODIUM PHOSPHATE 4 MG/ML
4 INJECTION, SOLUTION INTRA-ARTICULAR; INTRALESIONAL; INTRAMUSCULAR; INTRAVENOUS; SOFT TISSUE ONCE
Qty: 30 ML | Refills: 0 | Status: SHIPPED | OUTPATIENT
Start: 2020-01-10 | End: 2020-06-09

## 2020-01-10 ASSESSMENT — MIFFLIN-ST. JEOR: SCORE: 1532.19

## 2020-01-10 NOTE — LETTER
1/10/2020         RE: Marlen Solano  [address] [place] [address]        Dear Colleague,    Thank you for referring your patient, Marlen Solano, to the [facility]. Please see a copy of my visit note below.    PATIENT HISTORY:  Dr. White requested I see this patient for their foot issue.  Marlen Solano is a 44 year old female who presents to clinic for pain to both feet. Notes it has been going on for months. Denies injury. Pain is 5/10. Worse in the morning or when getting up from sitting. Has tried flip flops in the house, does not help. States it is a deep ache. Wondering what is causing the pain and what can be done for it.     Review of Systems:  Patient denies fever, chills, rash, wound, stiffness, numbness, weakness, heart burn, blood in stool, chest pain with activity, calf pain when walking, shortness of breath with activity, chronic cough, easy bleeding/bruising, swelling of ankles, excessive thirst, fatigue, depression, anxiety.  Patient admits to limping at times.     PAST MEDICAL HISTORY:   Past Medical History:   Diagnosis Date     Lump of right breast         PAST SURGICAL HISTORY:   Past Surgical History:   Procedure Laterality Date     ABDOMEN SURGERY           ABDOMEN SURGERY           ABDOMEN SURGERY           EXCISE LESION TRUNK Left 10/19/2018    Procedure: EXCISE LESION TRUNK;  Surgeon: Hosea Fernandez MD;  Location: [facility] OR     LUMPECTOMY BREAST WITH SEED LOCALIZATION Right 10/19/2018    Procedure: right breast seed localized biopsy with excision left breast skin lesion;  Surgeon: Hosea Fernandez MD;  Location: [facility] OR        MEDICATIONS:   Current Outpatient Medications:      tamoxifen (NOLVADEX) 20 MG tablet, Take 1 tablet (20 mg) by mouth daily, Disp: 90 tablet, Rfl: 3     ALLERGIES:  No Known Allergies     SOCIAL HISTORY:   Social History     Socioeconomic History     Marital status:      Spouse name:  "Not on file     Number of children: Not on file     Years of education: Not on file     Highest education level: Not on file   Occupational History     Not on file   Social Needs     Financial resource strain: Not on file     Food insecurity:     Worry: Not on file     Inability: Not on file     Transportation needs:     Medical: Not on file     Non-medical: Not on file   Tobacco Use     Smoking status: Never Smoker     Smokeless tobacco: Never Used   Substance and Sexual Activity     Alcohol use: No     Alcohol/week: 0.0 standard drinks     Drug use: No     Sexual activity: Yes     Partners: Male     Birth control/protection: Post-menopausal   Lifestyle     Physical activity:     Days per week: Not on file     Minutes per session: Not on file     Stress: Not on file   Relationships     Social connections:     Talks on phone: Not on file     Gets together: Not on file     Attends Rastafarian service: Not on file     Active member of club or organization: Not on file     Attends meetings of clubs or organizations: Not on file     Relationship status: Not on file     Intimate partner violence:     Fear of current or ex partner: Not on file     Emotionally abused: Not on file     Physically abused: Not on file     Forced sexual activity: Not on file   Other Topics Concern     Parent/sibling w/ CABG, MI or angioplasty before 65F 55M? Not Asked   Social History Narrative     Not on file        FAMILY HISTORY:   Family History   Problem Relation Age of Onset     Family History Negative Mother      Cerebrovascular Disease Father      Hypertension Father      Hypertension Brother      Diabetes Paternal Grandfather         EXAM:Vitals: /62   Ht 1.626 m (5' 4\")   Wt 90.7 kg (200 lb)   BMI 34.33 kg/m     BMI= Body mass index is 34.33 kg/m .    General appearance: Patient is alert and fully cooperative with history & exam.  No sign of distress is noted during the visit.     Psychiatric: Affect is pleasant & appropriate.  " Patient appears motivated to improve health.     Respiratory: Breathing is regular & unlabored while sitting.     HEENT: Hearing is intact to spoken word.  Speech is clear.  No gross evidence of visual impairment that would impact ambulation.     Dermatologic: Skin is intact to both lower extremities without significant lesions, rash or abrasion.  No paronychia or evidence of soft tissue infection is noted.     Vascular: DP & PT pulses are intact & regular bilaterally.  No significant edema or varicosities noted.  CFT and skin temperature is normal to both lower extremities.     Neurologic: Lower extremity sensation is intact to light touch.  No evidence of weakness or contracture in the lower extremities.  No evidence of neuropathy.     Musculoskeletal: Patient is ambulatory without assistive device or brace.  Decrease arch height. Pain on palpation of the heels bilateral.      ASSESSMENT:    Foot pain, bilateral  Pes planus of both feet  Plantar fasciitis, bilateral     PLAN:  Reviewed patient's chart in [facility]. The potential causes and nature of plantar fasciitis were discussed with the patient.  We reviewed the natural history/prognosis of the condition and risks if left untreated.  These include chronic pain, other sites of pain due to gait changes, and potential plantar fascial rupture.      We discussed possible causes of the condition as it relates to the patients specific situation.      Conservative treatment options were reviewed:  appropriate shoes, avoidance of barefoot walking, inserts/orthoses, stretching, ice, massage, immobilization and NSAIDs.     We also reviewed the options of injection therapy and surgery.  However, it was made clear that surgery is only considered when conservative therapy fails.  The risks and benefits of injection therapy, and surgery were discussed.     After thorough discussion and answering all questions, the patient elected to try arch supports, stretching, topica pain  cream, nsaids, PT with iontophoresis. Also recommend not going barefoot. If pain continues, recommend MRi, boot or injection.     Ramya Sanchez DPM, Podiatry/Foot and Ankle Surgery    Weight management plan: Patient was referred to their PCP to discuss a diet and exercise plan.      Again, thank you for allowing me to participate in the care of your patient.        Sincerely,        Ramya Sanchez DPM, Podiatry/Foot and Ankle Surgery

## 2020-01-10 NOTE — PROGRESS NOTES
PATIENT HISTORY:  Dr. White requested I see this patient for their foot issue.  Marlen Solano is a 44 year old female who presents to clinic for pain to both feet. Notes it has been going on for months. Denies injury. Pain is 5/10. Worse in the morning or when getting up from sitting. Has tried flip flops in the house, does not help. States it is a deep ache. Wondering what is causing the pain and what can be done for it.     Review of Systems:  Patient denies fever, chills, rash, wound, stiffness, numbness, weakness, heart burn, blood in stool, chest pain with activity, calf pain when walking, shortness of breath with activity, chronic cough, easy bleeding/bruising, swelling of ankles, excessive thirst, fatigue, depression, anxiety.  Patient admits to limping at times.     PAST MEDICAL HISTORY:   Past Medical History:   Diagnosis Date     Lump of right breast         PAST SURGICAL HISTORY:   Past Surgical History:   Procedure Laterality Date     ABDOMEN SURGERY           ABDOMEN SURGERY           ABDOMEN SURGERY           EXCISE LESION TRUNK Left 10/19/2018    Procedure: EXCISE LESION TRUNK;  Surgeon: Hosea Fernandez MD;  Location: [facility] OR     LUMPECTOMY BREAST WITH SEED LOCALIZATION Right 10/19/2018    Procedure: right breast seed localized biopsy with excision left breast skin lesion;  Surgeon: Hosea Fernandez MD;  Location: [facility] OR        MEDICATIONS:   Current Outpatient Medications:      tamoxifen (NOLVADEX) 20 MG tablet, Take 1 tablet (20 mg) by mouth daily, Disp: 90 tablet, Rfl: 3     ALLERGIES:  No Known Allergies     SOCIAL HISTORY:   Social History     Socioeconomic History     Marital status:      Spouse name: Not on file     Number of children: Not on file     Years of education: Not on file     Highest education level: Not on file   Occupational History     Not on file   Social Needs     Financial resource strain: Not on file     Food insecurity:     Worry:  "Not on file     Inability: Not on file     Transportation needs:     Medical: Not on file     Non-medical: Not on file   Tobacco Use     Smoking status: Never Smoker     Smokeless tobacco: Never Used   Substance and Sexual Activity     Alcohol use: No     Alcohol/week: 0.0 standard drinks     Drug use: No     Sexual activity: Yes     Partners: Male     Birth control/protection: Post-menopausal   Lifestyle     Physical activity:     Days per week: Not on file     Minutes per session: Not on file     Stress: Not on file   Relationships     Social connections:     Talks on phone: Not on file     Gets together: Not on file     Attends Gnosticism service: Not on file     Active member of club or organization: Not on file     Attends meetings of clubs or organizations: Not on file     Relationship status: Not on file     Intimate partner violence:     Fear of current or ex partner: Not on file     Emotionally abused: Not on file     Physically abused: Not on file     Forced sexual activity: Not on file   Other Topics Concern     Parent/sibling w/ CABG, MI or angioplasty before 65F 55M? Not Asked   Social History Narrative     Not on file        FAMILY HISTORY:   Family History   Problem Relation Age of Onset     Family History Negative Mother      Cerebrovascular Disease Father      Hypertension Father      Hypertension Brother      Diabetes Paternal Grandfather         EXAM:Vitals: /62   Ht 1.626 m (5' 4\")   Wt 90.7 kg (200 lb)   BMI 34.33 kg/m    BMI= Body mass index is 34.33 kg/m .    General appearance: Patient is alert and fully cooperative with history & exam.  No sign of distress is noted during the visit.     Psychiatric: Affect is pleasant & appropriate.  Patient appears motivated to improve health.     Respiratory: Breathing is regular & unlabored while sitting.     HEENT: Hearing is intact to spoken word.  Speech is clear.  No gross evidence of visual impairment that would impact ambulation.   "   Dermatologic: Skin is intact to both lower extremities without significant lesions, rash or abrasion.  No paronychia or evidence of soft tissue infection is noted.     Vascular: DP & PT pulses are intact & regular bilaterally.  No significant edema or varicosities noted.  CFT and skin temperature is normal to both lower extremities.     Neurologic: Lower extremity sensation is intact to light touch.  No evidence of weakness or contracture in the lower extremities.  No evidence of neuropathy.     Musculoskeletal: Patient is ambulatory without assistive device or brace.  Decrease arch height. Pain on palpation of the heels bilateral.      ASSESSMENT:    Foot pain, bilateral  Pes planus of both feet  Plantar fasciitis, bilateral     PLAN:  Reviewed patient's chart in [facility]. The potential causes and nature of plantar fasciitis were discussed with the patient.  We reviewed the natural history/prognosis of the condition and risks if left untreated.  These include chronic pain, other sites of pain due to gait changes, and potential plantar fascial rupture.      We discussed possible causes of the condition as it relates to the patients specific situation.      Conservative treatment options were reviewed:  appropriate shoes, avoidance of barefoot walking, inserts/orthoses, stretching, ice, massage, immobilization and NSAIDs.     We also reviewed the options of injection therapy and surgery.  However, it was made clear that surgery is only considered when conservative therapy fails.  The risks and benefits of injection therapy, and surgery were discussed.     After thorough discussion and answering all questions, the patient elected to try arch supports, stretching, topica pain cream, nsaids, PT with iontophoresis. Also recommend not going barefoot. If pain continues, recommend MRi, boot or injection.     Ramya Sanchez DPM, Podiatry/Foot and Ankle Surgery    Weight management plan: Patient was referred to their PCP to  discuss a diet and exercise plan.

## 2020-01-29 ENCOUNTER — THERAPY VISIT (OUTPATIENT)
Dept: PHYSICAL THERAPY | Facility: CLINIC | Age: 45
End: 2020-01-29
Payer: COMMERCIAL

## 2020-01-29 DIAGNOSIS — M21.41 PES PLANUS OF BOTH FEET: ICD-10-CM

## 2020-01-29 DIAGNOSIS — M79.671 FOOT PAIN, BILATERAL: ICD-10-CM

## 2020-01-29 DIAGNOSIS — M79.672 FOOT PAIN, BILATERAL: ICD-10-CM

## 2020-01-29 DIAGNOSIS — M21.42 PES PLANUS OF BOTH FEET: ICD-10-CM

## 2020-01-29 DIAGNOSIS — M72.2 PLANTAR FASCIITIS, BILATERAL: ICD-10-CM

## 2020-01-29 PROCEDURE — 97161 PT EVAL LOW COMPLEX 20 MIN: CPT | Mod: GP | Performed by: PHYSICAL THERAPIST

## 2020-01-29 PROCEDURE — 97110 THERAPEUTIC EXERCISES: CPT | Mod: GP | Performed by: PHYSICAL THERAPIST

## 2020-01-29 PROCEDURE — 97140 MANUAL THERAPY 1/> REGIONS: CPT | Mod: GP | Performed by: PHYSICAL THERAPIST

## 2020-01-29 NOTE — PROGRESS NOTES
[facility]: Physical Therapy Initial Evaluation   Jan 29, 2020    Subjective:   Chief Complaint: bilateral foot pain   Pain: all along the bottom on the foot on both sides   Numbness/Tingling: None   Weakness: None   Stiffness: None  New/Recurrent/Chronic: New  DOI/onset: 2 months ago   Referral Date: 1/10/2020 - Ramya Sanchez DPM  Mechanism of onset: Unknown  PMH/surgical history/trauma: No significant medical history reports  General health as reported by patient: Excellent    Medications: Tylenol,   Occupation:  at the hospital Job duties: lifting/carrying, prolonged standing,   Previous Treatment (Effect): tylenol (not very helpful) ;   Imaging: None  AM/PM: no difference morning versus evening  Quality of Pain: sharp  Pain: 0/10 at present, 0/10 at best, 7/10 at worst  Worse: standing up after sitting ;   Better: keep moving  Progression of Symptoms since onset: better   Sleeping: No disturbance unless she goes to use the bathroom.    Current Functional Status:   - standing up - first few steps after standing up are painful   Previous Functional Status: No restrictions  Current HEP/exercise regimen: Got a few exercises from PCP, but otherwise no exercise habits or routines in a while.   Transportation to Therapy: Independent with transportation   Live with Others:  and 4 kids,       Objective:    Standing Posture: bilateral pes planus    Gait: increased pelvic rotation    SL Balance: WNL    Ankle AROM (PROM): (* indicates patient's pain)   ROM R ROM L   Plantarflexion 45 55   DF, knee straight -10 (0) -15 (-10)   DF, knee bent 5 0       Strength: (* indicates patient's pain)   MMT R MMT L   DF/Inv 4+ 5   DF/Ev 5 4+   PF/Ev 5 4+   PF/Inv  4+ 5       Ankle/Foot Mobilizations (hyper vs hypo): Joint mobility normal unless otherwise noted.   - Talocrual: bilateral hypomobility  - Subtalar:  - Talonavicular:  - Calcaneocuboid:  - Cubonavicular-cuniform:    Palpation:  tenderness to palpation in the gastroc bilaterally    Special Tests:   R L   Windlass Test (-) (+)       Assessment/Plan:    Patient is a 44 year old female with bilateral foot complaints.    Patient has the following significant findings with corresponding treatment plan.                Referring Diagnosis: Foot pain, bilateral ; Pes planus of both feet ; Plantar fasciitis, bilateral  Pain -  hot/cold therapy, manual therapy, splint/taping/bracing/orthotics, self management, education and home program  Decreased ROM/flexibility - manual therapy, therapeutic exercise, therapeutic activity and home program  Decreased joint mobility - manual therapy, therapeutic exercise, therapeutic activity and home program  Decreased strength - therapeutic exercise, therapeutic activities and home program  Impaired gait - gait training and home program  Decreased function - therapeutic activities and home program  Impaired posture - neuro re-education, therapeutic activities and home program      Therapy Evaluation Codes:   1) History comprised of:   Personal factors that impact the plan of care:      None.    Comorbidity factors that impact the plan of care are:      None.     Medications impacting care: None.  2) Examination of Body Systems comprised of:   Body structures and functions that impact the plan of care:      Ankle and Foot.   Activity limitations that impact the plan of care are:      Standing and Walking.  3) Clinical presentation characteristics are:   Evolving/Changing.  4) Decision-Making    Low complexity using standardized patient assessment instrument and/or measureable assessment of functional outcome.  Cumulative Therapy Evaluation is: Low complexity.    Previous and current functional limitations:  (See Goal Flow Sheet for this information)    Short term and Long term goals: (See Goal Flow Sheet for this information)     Communication ability:  Patient appears to be able to clearly communicate and understand  verbal and written communication and follow directions correctly.  Treatment Explanation - The following has been discussed with the patient:   RX ordered/plan of care  Anticipated outcomes  Possible risks and side effects  This patient would benefit from PT intervention to resume normal activities.   Rehab potential is good.    Frequency:  1 X week, once daily  Duration:  for 6 weeks  Discharge Plan:  Achieve all LTG.  Independent in home treatment program.  Reach maximal therapeutic benefit.    Please refer to the daily flowsheet for treatment today, total treatment time and time spent performing 1:1 timed codes.

## 2020-01-30 PROBLEM — M79.672 FOOT PAIN, BILATERAL: Status: ACTIVE | Noted: 2020-01-30

## 2020-01-30 PROBLEM — M72.2 PLANTAR FASCIITIS, BILATERAL: Status: ACTIVE | Noted: 2020-01-30

## 2020-01-30 PROBLEM — M79.671 FOOT PAIN, BILATERAL: Status: ACTIVE | Noted: 2020-01-30

## 2020-03-17 PROBLEM — M72.2 PLANTAR FASCIITIS, BILATERAL: Status: RESOLVED | Noted: 2020-01-30 | Resolved: 2020-03-17

## 2020-03-17 PROBLEM — M79.671 FOOT PAIN, BILATERAL: Status: RESOLVED | Noted: 2020-01-30 | Resolved: 2020-03-17

## 2020-03-17 PROBLEM — M79.672 FOOT PAIN, BILATERAL: Status: RESOLVED | Noted: 2020-01-30 | Resolved: 2020-03-17

## 2020-03-17 NOTE — PROGRESS NOTES
DISCHARGE REPORT    Updated as of March 17, 2020.    Discharge report is from initial evaluation on Jan 29, 2020.       SUBJECTIVE  Subjective changes noted by patient: Patient has not returned since initial evaluation.   Changes in function:  Patient has not returned to clinic to assess.   Adverse reaction to treatment or activity: Patient has not returned to clinic to assess.     OBJECTIVE  Changes noted in objective findings:  Patient has failed to return to therapy so current objective findings are unknown.  Objective: See initial evaluation note.      ASSESSMENT/PLAN  STG/LTGs have been met or progress has been made towards goals:  Goal status unknown  Assessment of Progress: Patient has not returned to therapy.  Current status is unknown and discharge G code cannot be reported.  Vibha continues to require the following intervention to meet STG and LTG's:  Unknown, patient has not returned to therapy.     Recommendations:  No recommendations can accurately be made. Patient has failed to return to therapy.     Please refer to the daily flowsheet for treatment today, total treatment time and time spent performing 1:1 timed codes.

## 2020-05-07 ENCOUNTER — HOSPITAL ENCOUNTER (OUTPATIENT)
Dept: MAMMOGRAPHY | Facility: CLINIC | Age: 45
Discharge: HOME | End: 2020-05-07
Attending: INTERNAL MEDICINE | Admitting: INTERNAL MEDICINE
Payer: COMMERCIAL

## 2020-05-07 DIAGNOSIS — N60.91 ATYPICAL LOBULAR HYPERPLASIA (ALH) OF RIGHT BREAST: ICD-10-CM

## 2020-05-07 PROCEDURE — 77067 SCR MAMMO BI INCL CAD: CPT

## 2020-06-06 ENCOUNTER — OFFICE VISIT (OUTPATIENT)
Dept: URGENT CARE | Facility: URGENT CARE | Age: 45
End: 2020-06-06
Payer: COMMERCIAL

## 2020-06-06 ENCOUNTER — HOSPITAL ENCOUNTER (EMERGENCY)
Facility: CLINIC | Age: 45
Discharge: HOME | End: 2020-06-06
Attending: EMERGENCY MEDICINE | Admitting: EMERGENCY MEDICINE
Payer: COMMERCIAL

## 2020-06-06 ENCOUNTER — APPOINTMENT (OUTPATIENT)
Dept: CT IMAGING | Facility: CLINIC | Age: 45
End: 2020-06-06
Attending: EMERGENCY MEDICINE
Payer: COMMERCIAL

## 2020-06-06 VITALS
WEIGHT: 198.7 LBS | HEART RATE: 64 BPM | DIASTOLIC BLOOD PRESSURE: 94 MMHG | TEMPERATURE: 97 F | BODY MASS INDEX: 34.11 KG/M2 | SYSTOLIC BLOOD PRESSURE: 136 MMHG | OXYGEN SATURATION: 98 %

## 2020-06-06 VITALS
TEMPERATURE: 98.5 F | HEART RATE: 74 BPM | SYSTOLIC BLOOD PRESSURE: 136 MMHG | OXYGEN SATURATION: 98 % | DIASTOLIC BLOOD PRESSURE: 82 MMHG | RESPIRATION RATE: 15 BRPM

## 2020-06-06 DIAGNOSIS — G44.219 EPISODIC TENSION-TYPE HEADACHE, NOT INTRACTABLE: ICD-10-CM

## 2020-06-06 DIAGNOSIS — R03.0 ELEVATED BP WITHOUT DIAGNOSIS OF HYPERTENSION: ICD-10-CM

## 2020-06-06 DIAGNOSIS — R03.0 ELEVATED BLOOD PRESSURE READING WITHOUT DIAGNOSIS OF HYPERTENSION: Primary | ICD-10-CM

## 2020-06-06 LAB
ALBUMIN SERPL-MCNC: 3.7 G/DL (ref 3.4–5)
ALP SERPL-CCNC: 69 U/L (ref 40–150)
ALT SERPL W P-5'-P-CCNC: 26 U/L (ref 0–50)
ANION GAP SERPL CALCULATED.3IONS-SCNC: 3 MMOL/L (ref 3–14)
AST SERPL W P-5'-P-CCNC: 21 U/L (ref 0–45)
BASOPHILS # BLD AUTO: 0 10E9/L (ref 0–0.2)
BASOPHILS NFR BLD AUTO: 0.6 %
BILIRUB SERPL-MCNC: 0.8 MG/DL (ref 0.2–1.3)
BUN SERPL-MCNC: 12 MG/DL (ref 7–30)
CALCIUM SERPL-MCNC: 8.5 MG/DL (ref 8.5–10.1)
CHLORIDE SERPL-SCNC: 108 MMOL/L (ref 94–109)
CO2 SERPL-SCNC: 28 MMOL/L (ref 20–32)
CREAT SERPL-MCNC: 0.93 MG/DL (ref 0.52–1.04)
DIFFERENTIAL METHOD BLD: ABNORMAL
EOSINOPHIL # BLD AUTO: 0.1 10E9/L (ref 0–0.7)
EOSINOPHIL NFR BLD AUTO: 2.2 %
ERYTHROCYTE [DISTWIDTH] IN BLOOD BY AUTOMATED COUNT: 14.3 % (ref 10–15)
GFR SERPL CREATININE-BSD FRML MDRD: 73 ML/MIN/{1.73_M2}
GLUCOSE SERPL-MCNC: 163 MG/DL (ref 70–99)
HCT VFR BLD AUTO: 44 % (ref 35–47)
HGB BLD-MCNC: 15.3 G/DL (ref 11.7–15.7)
IMM GRANULOCYTES # BLD: 0 10E9/L (ref 0–0.4)
IMM GRANULOCYTES NFR BLD: 0.2 %
LYMPHOCYTES # BLD AUTO: 2.2 10E9/L (ref 0.8–5.3)
LYMPHOCYTES NFR BLD AUTO: 43.1 %
MCH RBC QN AUTO: 28.2 PG (ref 26.5–33)
MCHC RBC AUTO-ENTMCNC: 34.8 G/DL (ref 31.5–36.5)
MCV RBC AUTO: 81 FL (ref 78–100)
MONOCYTES # BLD AUTO: 0.2 10E9/L (ref 0–1.3)
MONOCYTES NFR BLD AUTO: 4.4 %
NEUTROPHILS # BLD AUTO: 2.5 10E9/L (ref 1.6–8.3)
NEUTROPHILS NFR BLD AUTO: 49.5 %
NRBC # BLD AUTO: 0 10*3/UL
NRBC BLD AUTO-RTO: 0 /100
PLATELET # BLD AUTO: 311 10E9/L (ref 150–450)
POTASSIUM SERPL-SCNC: 3.6 MMOL/L (ref 3.4–5.3)
PROT SERPL-MCNC: 7.7 G/DL (ref 6.8–8.8)
RBC # BLD AUTO: 5.43 10E12/L (ref 3.8–5.2)
SODIUM SERPL-SCNC: 139 MMOL/L (ref 133–144)
TROPONIN I SERPL-MCNC: <0.015 UG/L (ref 0–0.04)
TSH SERPL DL<=0.005 MIU/L-ACNC: 0.44 MU/L (ref 0.4–4)
WBC # BLD AUTO: 5 10E9/L (ref 4–11)

## 2020-06-06 PROCEDURE — 80053 COMPREHEN METABOLIC PANEL: CPT | Performed by: EMERGENCY MEDICINE

## 2020-06-06 PROCEDURE — 93005 ELECTROCARDIOGRAM TRACING: CPT

## 2020-06-06 PROCEDURE — 84484 ASSAY OF TROPONIN QUANT: CPT | Performed by: EMERGENCY MEDICINE

## 2020-06-06 PROCEDURE — 99285 EMERGENCY DEPT VISIT HI MDM: CPT | Mod: 25

## 2020-06-06 PROCEDURE — 85025 COMPLETE CBC W/AUTO DIFF WBC: CPT | Performed by: EMERGENCY MEDICINE

## 2020-06-06 PROCEDURE — 99207 ZZC NO BILLABLE SERVICE THIS VISIT: CPT | Performed by: FAMILY MEDICINE

## 2020-06-06 PROCEDURE — 84443 ASSAY THYROID STIM HORMONE: CPT | Performed by: EMERGENCY MEDICINE

## 2020-06-06 PROCEDURE — 70450 CT HEAD/BRAIN W/O DYE: CPT

## 2020-06-06 RX ORDER — AMLODIPINE BESYLATE 2.5 MG/1
2.5 TABLET ORAL DAILY
Qty: 30 TABLET | Refills: 0 | Status: SHIPPED | OUTPATIENT
Start: 2020-06-06 | End: 2020-10-13

## 2020-06-06 ASSESSMENT — ENCOUNTER SYMPTOMS
HEADACHES: 1
FEVER: 0
CHILLS: 0

## 2020-06-06 NOTE — ED NOTES
All patient questions have been answered, no further questions at this time. Discharge paperwork was gone over with patient. Patient verbalizes understanding of discharge teaching, medications, when to return and the importance of follow up.  Patient verbalizes feeling safe returning home at this time.

## 2020-06-06 NOTE — DISCHARGE INSTRUCTIONS
Discharge Instructions  Headache    You were seen today for a headache. Headaches may be caused by many different things such as muscle tension, sinus inflammation, anxiety and stress, having too little sleep, too much alcohol, some medical conditions or injury. You may have a migraine, which is caused by changes in the blood vessels in your head.  At this time your provider does not find that your headache is a sign of anything dangerous or life-threatening.  However, sometimes the signs of serious illness do not show up right away.      Generally, every Emergency Department visit should have a follow-up clinic visit with either a primary or a specialty clinic/provider. Please follow-up as instructed by your emergency provider today.    Return to the Emergency Department if:  You get a new fever of 100.4 F or higher.  Your headache gets much worse.  You get a stiff neck with your headache.  You get a new headache that is significantly different or worse than headaches you have had before.  You are vomiting (throwing up) and cannot keep food or water down.  You have blurry or double vision or other problems with your eyes.  You have a new weakness on one side of your body.  You have difficulty with balance which is new.  You or your family thinks you are confused.  You have a seizure.    What can I do to help myself?  Pain medications - You may take a pain medication such as Tylenol  (acetaminophen), Advil , Motrin  (ibuprofen) or Aleve  (naproxen).  Take a pain reliever as soon as you notice symptoms.  Starting medications as soon as you start to have symptoms may lessen the amount of pain you have.  Relaxing in a quiet, dark room may help.  Get enough sleep and eat meals regularly.  You may need to watch for certain foods or other things which may trigger your headaches.  Keeping a journal of your headaches and possible triggers may help you and your primary provider to identify things which you should avoid which  may be causing your headaches.  If you were given a prescription for medicine here today, be sure to read all of the information (including the package insert) that comes with your prescription.  This will include important information about the medicine, its side effects, and any warnings that you need to know about.  The pharmacist who fills the prescription can provide more information and answer questions you may have about the medicine.  If you have questions or concerns that the pharmacist cannot address, please call or return to the Emergency Department.   Remember that you can always come back to the Emergency Department if you are not able to see your regular provider in the amount of time listed above, if you get any new symptoms, or if there is anything that worries you.  Discharge Instructions  Hypertension - High Blood Pressure    During you visit to the Emergency Department, your blood pressure was higher than the recommended blood pressure.  This may be related to stress, pain, medication or other temporary conditions. In these cases, your blood pressure may return to normal on its own. If you have a history of high blood pressure, you may need to have your provider adjust your medications. Sometimes, your high measurement here may indicate that you have developed high blood pressure that will stay high unless it is treated. As a general rule, high blood pressure causes problems over years rather than days, weeks, or months. So, while it is important to treat blood pressure, it is rarely important to treat blood pressure immediately. Occasionally we will begin a medication in the Emergency Department; more often we will recommend close follow-up for medications with a primary doctor/clinic.    Generally, every Emergency Department visit should have a follow-up clinic visit with either a primary or a specialty clinic/provider. Please follow-up as instructed by your emergency provider today.    Return  to the Emergency Department if you start to have:  A severe headache.  Chest pain.  Shortness of breath.  Weakness or numbness that affects one part of the body.  Confusion.  Vision changes.  Significant swelling of legs and/or eyes.  A reaction to any medication started in the Emergency Department.    What can I do to help myself?  Avoid alcohol.  Take any blood pressure medicine that you are prescribed.  Get a good night s sleep.  Lower your salt intake.  Exercise.  Lose weight.  Manage stress.  See your doctor regularly    If blood pressure medication was started in the Emergency Department:  The medicine may not have an immediate effect. The body and brain determine what blood pressure you have. The medicine s job is to retrain the body s  thermostat  to a lower blood pressure.  You will need to follow up with your provider to see how this medicine is working for you.  If you were given a prescription for medicine here today, be sure to read all of the information (including the package insert) that comes with your prescription.  This will include important information about the medicine, its side effects, and any warnings that you need to know about.  The pharmacist who fills the prescription can provide more information and answer questions you may have about the medicine.  If you have questions or concerns that the pharmacist cannot address, please call or return to the Emergency Department.   Remember that you can always come back to the Emergency Department if you are not able to see your regular provider in the amount of time listed above, if you get any new symptoms, or if there is anything that worries you.

## 2020-06-06 NOTE — ED PROVIDER NOTES
History     Chief Complaint:  Hypertension      HPI   Marlen Solano is a 45 year old female with a history of right breast cancer s/p Tamoxifen therapy who presents to the emergency department for evaluation of hypertension. The patient reports that for the past month she has had a constant, moderate-severe headache. It intermittently improves with Aleve but relief is fleeting. For the last several days, she has been measuring her blood pressure which is persistently in the 180s/120s range while having a severe headaches so she went to Urgent Care for evaluation today but was sent here. She also called her PCP and has an appointment scheduled for 2020. No fevers or chills.     Allergies:  No Known Drug Allergies     Medications:    The patient is not currently taking any prescribed medications.    Past Medical History:    Right breast cancer     Past Surgical History:     x3  Trunk lesion excision   Right breast lumpectomy     Family History:    Cerebrovascular disease  Hypertension   Diabetes     Social History:  Tobacco Use: Never  Alcohol Use: No  PCP: Mary Ellen White  Marital Status:        Review of Systems   Constitutional: Negative for chills and fever.   Neurological: Positive for headaches.   All other systems reviewed and are negative.      Physical Exam     Patient Vitals for the past 24 hrs:   BP Temp Temp src Pulse Heart Rate Resp SpO2   20 1515 130/84 -- -- 59 61 15 95 %   20 1500 (!) 136/91 -- -- 70 70 22 97 %   20 1455 -- -- -- -- 67 26 97 %   20 1450 -- -- -- -- 78 22 99 %   20 1445 (!) 140/85 -- -- 66 -- (!) 38 97 %   20 1440 (!) 142/94 -- -- -- -- -- --   20 1432 133/86 98.5  F (36.9  C) Oral 77 -- 16 96 %     Physical Exam    Constitutional: Alert, attentive, GCS 15  HENT:    Nose: Nose normal.    Mouth/Throat: Oropharynx is clear, mucous membranes are moist  Eyes: EOM are normal, anicteric, conjugate gaze  CV: regular rate and  rhythm; no murmurs  Chest: Effort normal and breath sounds clear without wheezing or rales, symmetric bilaterally   GI:  non tender. No distension. No guarding or rebound.    MSK: No LE edema, no tenderness to palpation of BLE.  Neurological: Alert, attentive, moving all extremities equally.   Skin: Skin is warm and dry.    Emergency Department Course   ECG:  @ 1514  Indication: hypertension   Vent. Rate 60 bpm. NM interval 168 ms. QRS duration 92 ms. QT/QTc 422/422 ms. P-R-T axis 70 -71 57.   Normal sinus rhythm. Left axis deviation. Pulmonary disease pattern. V-wave. Abnormal ECG.   Read by Dr. Granados.    Imaging:  Head CT without contrast:  IMPRESSION: Normal CT scan of the head.   Report per radiology.     Radiographic findings were communicated with the patient who voiced understanding of the findings.    Laboratory:  CBC: WBC: 5.0, HGB: 15.3, PLT: 311  CMP: Glucose 163 (H), o/w WNL (Creatinine: 0.93)    TSH with free T4 reflex: 0.44    1454 Troponin I: <0.015    Interventions:  Medications - No data to display    Emergency Department Course:  165 Nursing notes and vitals reviewed. I performed an exam of the patient as documented above.     EKG was done, interpretation as above.    IV inserted. Medicine administered as documented above. Blood drawn. This was sent to the lab for further testing, results above.    The patient was sent for a head CT while in the emergency department, findings above.     1626 I rechecked the patient and discussed the results of her workup thus far.     Findings and plan explained to the Patient. Patient discharged home with instructions regarding supportive care, medications, and reasons to return. The importance of close follow-up was reviewed. The patient was prescribed norvasc.    I personally reviewed the laboratory and imaging results with the Patient and answered all related questions prior to discharge.     Impression & Plan    Medical Decision Makin-year-old woman  with past medical history significant for breast cancer currently on tamoxifen presenting for evaluation of 1 month intermittent headache she feels is associated with elevated blood pressure.  CT imaging of the head here negative, do not suspect intracranial hemorrhage or infection given duration of her symptoms, no indication for lumbar puncture further studies.  She has no focal neurologic deficits with low suspicion for CVA.  Her blood pressure here is mildly elevated however she gives a history of blood pressure as high as 180/100 associated with her headache.  EKG and troponin are negative, no evidence of renal injury.  Certainly symptomatic hypertension is possible, I will initiate her on very low-dose Norvasc and recommend PCP follow-up.  Return precautions were reviewed and patient was discharged home.    Diagnosis:    ICD-10-CM    1. Episodic tension-type headache, not intractable  G44.219    2. Elevated BP without diagnosis of hypertension  R03.0        Disposition:  Discharged to home    Discharge Medications:  New Prescriptions    AMLODIPINE (NORVASC) 2.5 MG TABLET    Take 1 tablet (2.5 mg) by mouth daily     Jun Granados MD  [employer]  4:21 PM 06/06/20     Scribe Disclosure:  I, Edgar Castillo, am serving as a scribe on 6/6/2020 at 2:51 PM to personally document services performed by Jun Torres MD based on my observations and the provider's statements to me.     Edgar Castillo  6/6/2020   [facility] EMERGENCY DEPARTMENT       Jun Granados MD  06/06/20 [id number]

## 2020-06-06 NOTE — ED AVS SNAPSHOT
[facility] Emergency Department  [address]  [facility] [address]  Phone:  [phone]  Fax:  [phone]                                    Marlen Solano   MRN: [id number]    Department:  [facility] Emergency Department   Date of Visit:  6/6/2020           After Visit Summary Signature Page    I have received my discharge instructions, and my questions have been answered. I have discussed any challenges I see with this plan with the nurse or doctor.    ..........................................................................................................................................  Patient/Patient Representative Signature      ..........................................................................................................................................  Patient Representative Print Name and Relationship to Patient    ..................................................               ................................................  Date                                   Time    ..........................................................................................................................................  Reviewed by Signature/Title    ...................................................              ..............................................  Date                                               Time          22EPIC Rev 08/18

## 2020-06-06 NOTE — ED TRIAGE NOTES
Patient presents to ER for high blood pressure the past couple days and has headache with high blood pressure. ABC's intact.

## 2020-06-06 NOTE — PROGRESS NOTES
THIS IS A TRIAGE ENCOUNTER    Marlen Solano is a 45 year old female with no history of hypertension who comes in for evaluation of a 3-day history of constant, worsening, pounding, severe generalized headache accompanied by recent blood pressure readings up to 186/121.  Patient will go to the emergency room now for further evaluation of a possible hypertensive urgency that may be causing the severe headache.     I told the patient that she would not be charge for the brief triage evaluation.      Ethan Umaña MD

## 2020-06-08 LAB — INTERPRETATION ECG - MUSE: NORMAL

## 2020-06-09 ENCOUNTER — VIRTUAL VISIT (OUTPATIENT)
Dept: INTERNAL MEDICINE | Facility: CLINIC | Age: 45
End: 2020-06-09
Payer: COMMERCIAL

## 2020-06-09 DIAGNOSIS — G44.209 TENSION HEADACHE: Primary | ICD-10-CM

## 2020-06-09 DIAGNOSIS — R03.0 ELEVATED BLOOD PRESSURE READING WITHOUT DIAGNOSIS OF HYPERTENSION: ICD-10-CM

## 2020-06-09 PROCEDURE — 99214 OFFICE O/P EST MOD 30 MIN: CPT | Mod: GT | Performed by: INTERNAL MEDICINE

## 2020-06-09 NOTE — PROGRESS NOTES
"Marlen Solano is a 45 year old female who is being evaluated via a billable video visit.      The patient has been notified of following:     \"This video visit will be conducted via a call between you and your physician/provider. We have found that certain health care needs can be provided without the need for an in-person physical exam.  This service lets us provide the care you need with a video conversation.  If a prescription is necessary we can send it directly to your pharmacy.  If lab work is needed we can place an order for that and you can then stop by our lab to have the test done at a later time.    Video visits are billed at different rates depending on your insurance coverage.  Please reach out to your insurance provider with any questions.    If during the course of the call the physician/provider feels a video visit is not appropriate, you will not be charged for this service.\"    Patient has given verbal consent for Video visit? Yes    How would you like to obtain your AVS? Mail a copy    Patient would like the video invitation sent by: Text to cell phone: [phone]    Will anyone else be joining your video visit? No      Subjective     Marlen Solano is a 45 year old female who presents today via video visit for the following health issues:    HPI  ED/UC Followup:    Facility:  [facility] ED  Date of visit: 06/06/2020  Reason for visit: Headache  Current Status: Improved     Video Start Time: 8:43    She had been having headache for a week, was fairly constant, pounding and throbbing.  She checked her blood pressure a couple of times and it was 180s/100s so she went to urgent care and they referred her to the ED.  Had negative head CT, normal kidney function.  She was put on amlodipine 2.5 mg daily.  She reports her headache has improved significantly.  She checked her blood pressure this morning is 130/101.  She had not had any blood pressure checks done between January and " "the past week when she had the headache.  She has not had a lot of trouble with headaches in the past.  She was taking some occasional Aleve but not high dosing or regular use.  She is not any other over-the-counter medications or supplements.    So far she has not had any side effects from the amlodipine.        Patient Active Problem List   Diagnosis     CARDIOVASCULAR SCREENING; LDL GOAL LESS THAN 130     Atypical lobular hyperplasia (ALH) of right breast     Current Outpatient Medications   Medication Sig Dispense Refill     amLODIPine (NORVASC) 2.5 MG tablet Take 1 tablet (2.5 mg) by mouth daily 30 tablet 0     dexamethasone (DECADRON) 4 MG/ML injection Apply 1 mL (4 mg) topically once for 1 dose For physical therapy, iontophoresis (Patient not taking: Reported on 6/6/2020) 30 mL 0     tamoxifen (NOLVADEX) 20 MG tablet Take 1 tablet (20 mg) by mouth daily (Patient not taking: Reported on 6/6/2020) 90 tablet 3      Social History     Tobacco Use     Smoking status: Never Smoker     Smokeless tobacco: Never Used   Substance Use Topics     Alcohol use: No     Alcohol/week: 0.0 standard drinks     Drug use: No        Reviewed and updated as needed this visit by Provider         Review of Systems   Minor headache, no chest pain or shortness breath      Objective    There were no vitals taken for this visit.  Estimated body mass index is 34.11 kg/m  as calculated from the following:    Height as of 1/10/20: 1.626 m (5' 4\").    Weight as of 6/6/20: 90.1 kg (198 lb 11.2 oz).  Physical Exam     GENERAL: Healthy, alert and no distress  EYES: Eyes grossly normal to inspection.  No discharge or erythema, or obvious scleral/conjunctival abnormalities.  RESP: No audible wheeze, cough, or visible cyanosis.  No visible retractions or increased work of breathing.    SKIN: Visible skin clear. No significant rash, abnormal pigmentation or lesions.  NEURO: Cranial nerves grossly intact.  Mentation and speech appropriate for " age.  PSYCH: Mentation appears normal, affect normal/bright, judgement and insight intact, normal speech and appearance well-groomed.              Assessment & Plan     1. Tension headache  Her headache may be tension type or mixed tension migraine.  At this point it is unclear if the headache was the cause of the blood pressure being elevated or if the elevated blood pressure was truly the cause of the headache.  Her headache has improved, advised on how to manage tension type headache including heat, gentle stretching, watching posture and position.  Recommend Tylenol preferred over NSAIDs.    2. Elevated blood pressure reading without diagnosis of hypertension  Blood pressure is improving on her current medication, at this point I recommend she continue the 2.5 mg but if her blood pressure starts increasing, particularly over the weekend her headache increases, she can go up to 5 mg daily.  Recommend do a few more blood pressure readings this week and call them to the nurse line early next week.  At that time we will consider whether we need to increase her medication or not.  This may represent true hypertension with fairly sudden onset.  Her renal function is normal so it is unlikely an acute kidney processes causing it.  If her blood pressure is difficult to manage, may need to do further evaluation for ruling out pheochromocytoma, renal artery stenosis but will hold off on that type of work-up at this time.           Video-Visit Details    Type of service:  Video Visit    Video End Time:8:57    Originating Location (pt. Location): Other work    Distant Location (provider location):Home    Platform used for Video Visit: Doximity    Return in about 2 months (around 8/9/2020).       Mary Ellen White MD

## 2020-06-11 ENCOUNTER — VIRTUAL VISIT (OUTPATIENT)
Dept: ONCOLOGY | Facility: CLINIC | Age: 45
End: 2020-06-11
Attending: INTERNAL MEDICINE
Payer: COMMERCIAL

## 2020-06-11 DIAGNOSIS — N60.92 ATYPICAL LOBULAR HYPERPLASIA (ALH) OF LEFT BREAST: ICD-10-CM

## 2020-06-11 PROCEDURE — 99214 OFFICE O/P EST MOD 30 MIN: CPT | Mod: TEL | Performed by: INTERNAL MEDICINE

## 2020-06-11 RX ORDER — TAMOXIFEN CITRATE 20 MG/1
20 TABLET ORAL DAILY
Qty: 90 TABLET | Refills: 3 | Status: SHIPPED | OUTPATIENT
Start: 2020-06-11 | End: 2021-02-18

## 2020-06-11 NOTE — LETTER
"    6/11/2020         RE: Marlen Solano  [address] [place] [address]        Dear Colleague,    Thank you for referring your patient, Marlen Solano, to the [facility]. Please see a copy of my visit note below.    Marlen Solano is a 45 year old female who is being evaluated via a billable telephone visit.      The patient has been notified of following:     \"This telephone visit will be conducted via a call between you and your physician/provider. We have found that certain health care needs can be provided without the need for a physical exam.  This service lets us provide the care you need with a short phone conversation.  If a prescription is necessary we can send it directly to your pharmacy.  If lab work is needed we can place an order for that and you can then stop by our lab to have the test done at a later time.    Telephone visits are billed at different rates depending on your insurance coverage. During this emergency period, for some insurers they may be billed the same as an in-person visit.  Please reach out to your insurance provider with any questions.    If during the course of the call the physician/provider feels a telephone visit is not appropriate, you will not be charged for this service.\"    Patient has given verbal consent for Telephone visit?  Yes    What phone number would you like to be contacted at? [phone]    How would you like to obtain your AVS? Mail a copy    Phone call duration: 15  minutes          Visit Date:   06/11/2020      Marlen Solano is a 45-year-old patient who has been evaluated today by a billable telephone visit due to a public health emergency with coronavirus.      HISTORY OF PRESENTING COMPLAINT:  Marlen is 45 years old.  She has a diagnosis of atypical lobular hyperplasia of the right breast.  She is status post right-sided lumpectomy and is on tamoxifen for prevention.  I have reviewed today a recent mammogram, which she " had done in 2020 and that was negative.  She was also admitted through the emergency room on 2020 because she had hypertension and she ended up having a CT scan of her head done because of headache and that was negative.  She has recently been started on Norvasc by her primary care physician.  She has got no symptoms that are worrisome for anything in the breast.  She denies any breast lumps any problems with the breast at this time.       REVIEW OF SYSTEMS:  A 10-point comprehensive review of systems is otherwise unremarkable.      MEDICATIONS AND ALLERGIES:  Outlined in the nursing records.      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      PAST MEDICAL HISTORY:  She has just recently been diagnosed with hypertension, started on Norvasc.      FAMILY HISTORY:  Remarkable for hypertension.      PAIN ASSESSMENT:  The patient denies pain today.      IMPRESSION:  A 45-year-old patient with atypical lobular hyperplasia of the right breast, on active treatment with tamoxifen.  The tamoxifen is going well for her.  She has had a recent diagnosis of hypertension.  She has just been started on Norvasc. There should be no interaction with the tamoxifen.  I have discussed that with her today.  She will continue on the tamoxifen and see me back in clinic in 6 months.  We will do labs when she comes back.  Telephone conversation with her today has been 15 minutes.         KERRIE FRANKS MD             D: 2020   T: 2020   MT:       Name:     SURAJ ANAND   MRN:      [id number]        Account:      [id number]   :      1975           Visit Date:   2020      Document: [id number]       Again, thank you for allowing me to participate in the care of your patient.        Sincerely,        Kerrie Franks MD

## 2020-06-11 NOTE — LETTER
"    6/11/2020         RE: Marlen Solano  [address] [place] [address]        Dear Colleague,    Thank you for referring your patient, Marlen Solano, to the [facility]. Please see a copy of my visit note below.    Marlen Solano is a 45 year old female who is being evaluated via a billable telephone visit.      The patient has been notified of following:     \"This telephone visit will be conducted via a call between you and your physician/provider. We have found that certain health care needs can be provided without the need for a physical exam.  This service lets us provide the care you need with a short phone conversation.  If a prescription is necessary we can send it directly to your pharmacy.  If lab work is needed we can place an order for that and you can then stop by our lab to have the test done at a later time.    Telephone visits are billed at different rates depending on your insurance coverage. During this emergency period, for some insurers they may be billed the same as an in-person visit.  Please reach out to your insurance provider with any questions.    If during the course of the call the physician/provider feels a telephone visit is not appropriate, you will not be charged for this service.\"    Patient has given verbal consent for Telephone visit?  Yes    What phone number would you like to be contacted at? [phone]    How would you like to obtain your AVS? Mail a copy    Phone call duration: 15  minutes          Visit Date:   06/11/2020      Marlen Solano is a 45-year-old patient who has been evaluated today by a billable telephone visit due to a public health emergency with coronavirus.      HISTORY OF PRESENTING COMPLAINT:  Marlen is 45 years old.  She has a diagnosis of atypical lobular hyperplasia of the right breast.  She is status post right-sided lumpectomy and is on tamoxifen for prevention.  I have reviewed today a recent mammogram, which she " had done in 2020 and that was negative.  She was also admitted through the emergency room on 2020 because she had hypertension and she ended up having a CT scan of her head done because of headache and that was negative.  She has recently been started on Norvasc by her primary care physician.  She has got no symptoms that are worrisome for anything in the breast.  She denies any breast lumps any problems with the breast at this time.       REVIEW OF SYSTEMS:  A 10-point comprehensive review of systems is otherwise unremarkable.      MEDICATIONS AND ALLERGIES:  Outlined in the nursing records.      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      PAST MEDICAL HISTORY:  She has just recently been diagnosed with hypertension, started on Norvasc.      FAMILY HISTORY:  Remarkable for hypertension.      PAIN ASSESSMENT:  The patient denies pain today.      IMPRESSION:  A 45-year-old patient with atypical lobular hyperplasia of the right breast, on active treatment with tamoxifen.  The tamoxifen is going well for her.  She has had a recent diagnosis of hypertension.  She has just been started on Norvasc. There should be no interaction with the tamoxifen.  I have discussed that with her today.  She will continue on the tamoxifen and see me back in clinic in 6 months.  We will do labs when she comes back.  Telephone conversation with her today has been 15 minutes.         KERRIE FRANKS MD             D: 2020   T: 2020   MT:       Name:     SURAJ ANAND   MRN:      [id number]        Account:      [id number]   :      1975           Visit Date:   2020      Document: [id number]       Again, thank you for allowing me to participate in the care of your patient.        Sincerely,        Kerrie Franks MD

## 2020-06-11 NOTE — PROGRESS NOTES
"Marlen Solano is a 45 year old female who is being evaluated via a billable telephone visit.      The patient has been notified of following:     \"This telephone visit will be conducted via a call between you and your physician/provider. We have found that certain health care needs can be provided without the need for a physical exam.  This service lets us provide the care you need with a short phone conversation.  If a prescription is necessary we can send it directly to your pharmacy.  If lab work is needed we can place an order for that and you can then stop by our lab to have the test done at a later time.    Telephone visits are billed at different rates depending on your insurance coverage. During this emergency period, for some insurers they may be billed the same as an in-person visit.  Please reach out to your insurance provider with any questions.    If during the course of the call the physician/provider feels a telephone visit is not appropriate, you will not be charged for this service.\"    Patient has given verbal consent for Telephone visit?  Yes    What phone number would you like to be contacted at? [phone]    How would you like to obtain your AVS? Mail a copy    Phone call duration: 15  minutes        "

## 2020-06-11 NOTE — PROGRESS NOTES
Visit Date:   06/11/2020      Marlen Solano is a 45-year-old patient who has been evaluated today by a billable telephone visit due to a public health emergency with coronavirus.      HISTORY OF PRESENTING COMPLAINT:  Marlen is 45 years old.  She has a diagnosis of atypical lobular hyperplasia of the right breast.  She is status post right-sided lumpectomy and is on tamoxifen for prevention.  I have reviewed today a recent mammogram, which she had done in 05/2020 and that was negative.  She was also admitted through the emergency room on 06/06/2020 because she had hypertension and she ended up having a CT scan of her head done because of headache and that was negative.  She has recently been started on Norvasc by her primary care physician.  She has got no symptoms that are worrisome for anything in the breast.  She denies any breast lumps any problems with the breast at this time.       REVIEW OF SYSTEMS:  A 10-point comprehensive review of systems is otherwise unremarkable.      MEDICATIONS AND ALLERGIES:  Outlined in the nursing records.      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      PAST MEDICAL HISTORY:  She has just recently been diagnosed with hypertension, started on Norvasc.      FAMILY HISTORY:  Remarkable for hypertension.      PAIN ASSESSMENT:  The patient denies pain today.      IMPRESSION:  A 45-year-old patient with atypical lobular hyperplasia of the right breast, on active treatment with tamoxifen.  The tamoxifen is going well for her.  She has had a recent diagnosis of hypertension.  She has just been started on Norvasc. There should be no interaction with the tamoxifen.  I have discussed that with her today.  She will continue on the tamoxifen and see me back in clinic in 6 months.  We will do labs when she comes back.  Telephone conversation with her today has been 15 minutes.         KERRIE PACHECO MD             D: 06/11/2020   T:  2020   MT:       Name:     SURAJ ANAND   MRN:      [id number]        Account:      [id number]   :      1975           Visit Date:   2020      Document: [id number]

## 2020-10-13 ENCOUNTER — HOSPITAL ENCOUNTER (EMERGENCY)
Facility: CLINIC | Age: 45
Discharge: HOME | End: 2020-10-13
Attending: EMERGENCY MEDICINE | Admitting: EMERGENCY MEDICINE
Payer: COMMERCIAL

## 2020-10-13 VITALS
DIASTOLIC BLOOD PRESSURE: 94 MMHG | BODY MASS INDEX: 31.65 KG/M2 | OXYGEN SATURATION: 97 % | SYSTOLIC BLOOD PRESSURE: 130 MMHG | WEIGHT: 190 LBS | TEMPERATURE: 98.2 F | HEIGHT: 65 IN | RESPIRATION RATE: 24 BRPM | HEART RATE: 75 BPM

## 2020-10-13 DIAGNOSIS — I10 ESSENTIAL HYPERTENSION: ICD-10-CM

## 2020-10-13 LAB
ANION GAP SERPL CALCULATED.3IONS-SCNC: 7 MMOL/L (ref 3–14)
BASOPHILS # BLD AUTO: 0 10E9/L (ref 0–0.2)
BASOPHILS NFR BLD AUTO: 0.7 %
BUN SERPL-MCNC: 12 MG/DL (ref 7–30)
CALCIUM SERPL-MCNC: 8.4 MG/DL (ref 8.5–10.1)
CHLORIDE SERPL-SCNC: 107 MMOL/L (ref 94–109)
CO2 SERPL-SCNC: 25 MMOL/L (ref 20–32)
CREAT SERPL-MCNC: 0.79 MG/DL (ref 0.52–1.04)
DIFFERENTIAL METHOD BLD: NORMAL
EOSINOPHIL # BLD AUTO: 0.1 10E9/L (ref 0–0.7)
EOSINOPHIL NFR BLD AUTO: 1.9 %
ERYTHROCYTE [DISTWIDTH] IN BLOOD BY AUTOMATED COUNT: 14.6 % (ref 10–15)
GFR SERPL CREATININE-BSD FRML MDRD: 88 ML/MIN/{1.73_M2}
GLUCOSE SERPL-MCNC: 105 MG/DL (ref 70–99)
HCT VFR BLD AUTO: 40.7 % (ref 35–47)
HGB BLD-MCNC: 14.2 G/DL (ref 11.7–15.7)
IMM GRANULOCYTES # BLD: 0 10E9/L (ref 0–0.4)
IMM GRANULOCYTES NFR BLD: 0.2 %
INTERPRETATION ECG - MUSE: NORMAL
LYMPHOCYTES # BLD AUTO: 1.5 10E9/L (ref 0.8–5.3)
LYMPHOCYTES NFR BLD AUTO: 35.4 %
MCH RBC QN AUTO: 27.9 PG (ref 26.5–33)
MCHC RBC AUTO-ENTMCNC: 34.9 G/DL (ref 31.5–36.5)
MCV RBC AUTO: 80 FL (ref 78–100)
MONOCYTES # BLD AUTO: 0.4 10E9/L (ref 0–1.3)
MONOCYTES NFR BLD AUTO: 8.3 %
NEUTROPHILS # BLD AUTO: 2.3 10E9/L (ref 1.6–8.3)
NEUTROPHILS NFR BLD AUTO: 53.5 %
NRBC # BLD AUTO: 0 10*3/UL
NRBC BLD AUTO-RTO: 0 /100
PLATELET # BLD AUTO: 296 10E9/L (ref 150–450)
POTASSIUM SERPL-SCNC: 3.8 MMOL/L (ref 3.4–5.3)
RBC # BLD AUTO: 5.09 10E12/L (ref 3.8–5.2)
SODIUM SERPL-SCNC: 139 MMOL/L (ref 133–144)
TROPONIN I SERPL-MCNC: <0.015 UG/L (ref 0–0.04)
WBC # BLD AUTO: 4.3 10E9/L (ref 4–11)

## 2020-10-13 PROCEDURE — 99284 EMERGENCY DEPT VISIT MOD MDM: CPT

## 2020-10-13 PROCEDURE — 80048 BASIC METABOLIC PNL TOTAL CA: CPT | Performed by: EMERGENCY MEDICINE

## 2020-10-13 PROCEDURE — 84484 ASSAY OF TROPONIN QUANT: CPT | Performed by: EMERGENCY MEDICINE

## 2020-10-13 PROCEDURE — 85025 COMPLETE CBC W/AUTO DIFF WBC: CPT | Performed by: EMERGENCY MEDICINE

## 2020-10-13 PROCEDURE — 93005 ELECTROCARDIOGRAM TRACING: CPT

## 2020-10-13 RX ORDER — AMLODIPINE BESYLATE 2.5 MG/1
5 TABLET ORAL DAILY
Qty: 30 TABLET | Refills: 0 | Status: SHIPPED | OUTPATIENT
Start: 2020-10-13 | End: 2020-10-29

## 2020-10-13 ASSESSMENT — MIFFLIN-ST. JEOR: SCORE: 1497.71

## 2020-10-13 ASSESSMENT — ENCOUNTER SYMPTOMS
DIZZINESS: 1
CARDIOVASCULAR NEGATIVE: 1
RESPIRATORY NEGATIVE: 1
FEVER: 0
HEADACHES: 1

## 2020-10-13 NOTE — ED TRIAGE NOTES
Pt has been seen here in the past for hypertension and placed on blood pressure medication for a period of thirty days which she did but never got the medication refilled by her primary MD. Just last week started to have a headache and so had a co-worker measure her blood pressure which has been consistently elevated. Patient states today she has also experienced dizziness which is intermittent in nature.    Denies chest pain or shortness of breath.

## 2020-10-13 NOTE — ED PROVIDER NOTES
History     Chief Complaint:  Hypertension and Dizziness      HPI   Marlen Solano is a 45 year old female who presents with concerns for elevated blood pressure.  She has been experiencing a headache for the past several days.  Today she also had some poorly characterized dizziness intermittently.  A coworker checked her blood pressure which was elevated prompting her visit to the emergency department.  She has had no associated neurologic complaints, chest pain, or shortness of breath.  She was seen in this emergency department in 2020 with complaints of a headache was found to be hypertensive.  Work-up at that time included a negative noncontrast head CT, normal laboratory tests, and she was prescribed low-dose amlodipine.  She did take the 30-day supply but did not obtain any refills.    Allergies:  No Known Drug Allergies      Medications:    Amlodipine  Tamoxifen    Past Medical History:    Atypical lobular hyperplasia of right breast    Past Surgical History:     section x3  Excise lesion trunk  Breast lumpectomy with seed localization    Family History:    Hypertension   Cerebrovascular disease    Social History:  Smoking status: No  Alcohol use: No  Drug use: No  Patient presents alone.  PCP: Mary Ellen White    Marital Status:        Review of Systems   Constitutional: Negative for fever.   Respiratory: Negative.    Cardiovascular: Negative.    Neurological: Positive for dizziness and headaches.   All other systems reviewed and are negative.    Physical Exam     Patient Vitals for the past 24 hrs:   BP Temp Temp src Pulse Resp SpO2 Height Weight   10/13/20 1300 (!) 140/86 -- -- 59 -- -- -- --   10/13/20 1255 -- -- -- 55 16 -- -- --   10/13/20 1250 -- -- -- 56 14 -- -- --   10/13/20 1245 136/78 -- -- 54 13 -- -- --   10/13/20 1240 -- -- -- 56 13 -- -- --   10/13/20 1235 -- -- -- 54 14 -- -- --   10/13/20 1230 133/81 -- -- 56 14 -- -- --   10/13/20 1225 -- -- -- 56 14 -- -- --   10/13/20  "1220 -- -- -- 57 14 -- -- --   10/13/20 1215 129/79 -- -- 57 16 -- -- --   10/13/20 1210 -- -- -- 60 18 -- -- --   10/13/20 1205 -- -- -- 67 26 -- -- --   10/13/20 1200 (!) 148/88 -- -- 63 26 -- -- --   10/13/20 1121 (!) 162/106 98.2  F (36.8  C) Temporal 70 16 97 % 1.651 m (5' 5\") 86.2 kg (190 lb)      Physical Exam  General: Patient is alert and cooperative.  HENT:  Normal nose, oropharynx. Moist oral mucosa.  Eyes: EOMI. No nystagmus.   Neck:  Normal range of motion and appearance.   Cardiovascular:  Normal rate, regular rhythm and normal heart sounds.   Pulmonary/Chest:  Effort normal. No wheezing or crackles.  Abdominal: Soft. No distension or tenderness.     Musculoskeletal: Normal range of motion. No edema or tenderness.   Neurological: oriented, normal strength, sensation, and coordination.   Skin: Warm and dry. No rash or bruising.   Psychiatric: Normal mood and affect. Normal behavior and judgement.      Emergency Department Course   ECG:  @ 1140  Vent. Rate 60 bpm. MN interval 158 ms. QRS duration 106 ms. QT/QTc 408/408 ms. P-R-T axis 33 -72 -11.   Sinus rhythm. Left axis deviation. T wave abnormality, consider inferolateral ischemia Abnormal ECG.    Read by Dr. Werner.     Laboratory:  CBC: WBC 4.3, HGB 14.2,     BMP: Glucose 105 (H), Calcium: 8.4 (L), o/w WNL (Creatinine: 0.79)     1201 Troponin I: <0.015       Emergency Department Course:  1132 Nursing notes and vitals reviewed. I performed an exam of the patient as documented above.     EKG was done, interpretation as above.    Blood drawn. This was sent to the lab for further testing, results above.    1342 I rechecked the patient and discussed the results of her workup thus far.     Findings and plan explained to the Patient. Patient discharged home with instructions regarding supportive care, medications, and reasons to return. The importance of close follow-up was reviewed.     I personally reviewed the laboratory results with the Patient " and answered all related questions prior to discharge.      Impression & Plan    Medical Decision Makin-year-old female was presented mainly for concerns of elevated blood pressure.  She has had a headache and some poorly characterized dizziness but no other symptoms.  She has a normal physical examination and is generally quite well-appearing.  Blood pressures have persistently been elevated, generally in the 150/ range.  Her work-up has included an undetectable troponin, normal CBC and BMP including normal renal function.  An EKG demonstrates a sinus rhythm with nonspecific ST changes.  She has had no associated chest pain or other potential anginal equivalents and her symptoms have been fairly constant for several days.  An ischemic process I believe is effectively been ruled out.  Records from previous ED visit were reviewed.  As noted, she did have a negative CT head at that time.  Given her normal neurologic examination, there is no indication for repeat neuroimaging at this time.  She is not interested in starting blood pressure medication at this time.  I recommended she begin keeping a home blood pressure diary and have given her another prescription for amlodipine to initiate at her discretion if blood pressures are persistently elevated and have recommended that she schedule follow-up appointment with her primary care clinic next available for further discussion of these issues.    Diagnosis:    ICD-10-CM    1. Essential hypertension  I10        Disposition:  discharged to home    Discharge Medications:  Current Discharge Medication List        Amlodipine 5 mg daily      Cristina Vogt  10/13/2020   [facility] EMERGENCY DEPT    Scribe Disclosure:  Cristina GUZMAN, am serving as a scribe at 11:32 AM on 10/13/2020 to document services personally performed by Robert Werner MD based on my observations and the provider's statements to me.         Robert Werner,  MD  10/14/20 0854

## 2020-10-13 NOTE — ED AVS SNAPSHOT
[facility] Emergency Dept  [address]  [facility] [address]  Phone: [phone]  Fax: [phone]                                    Marlen Solano   MRN: [id number]    Department: [facility] Emergency Dept   Date of Visit: 10/13/2020           After Visit Summary Signature Page    I have received my discharge instructions, and my questions have been answered. I have discussed any challenges I see with this plan with the nurse or doctor.    ..........................................................................................................................................  Patient/Patient Representative Signature      ..........................................................................................................................................  Patient Representative Print Name and Relationship to Patient    ..................................................               ................................................  Date                                   Time    ..........................................................................................................................................  Reviewed by Signature/Title    ...................................................              ..............................................  Date                                               Time          22EPIC Rev 08/18

## 2020-10-29 ENCOUNTER — OFFICE VISIT (OUTPATIENT)
Dept: INTERNAL MEDICINE | Facility: CLINIC | Age: 45
End: 2020-10-29
Payer: COMMERCIAL

## 2020-10-29 VITALS
HEIGHT: 65 IN | BODY MASS INDEX: 34.29 KG/M2 | TEMPERATURE: 99 F | SYSTOLIC BLOOD PRESSURE: 133 MMHG | DIASTOLIC BLOOD PRESSURE: 87 MMHG | HEART RATE: 95 BPM | OXYGEN SATURATION: 96 % | RESPIRATION RATE: 20 BRPM | WEIGHT: 205.8 LBS

## 2020-10-29 DIAGNOSIS — I10 BENIGN ESSENTIAL HYPERTENSION: Primary | ICD-10-CM

## 2020-10-29 PROCEDURE — 99213 OFFICE O/P EST LOW 20 MIN: CPT | Performed by: INTERNAL MEDICINE

## 2020-10-29 RX ORDER — AMLODIPINE BESYLATE 5 MG/1
5 TABLET ORAL DAILY
Qty: 90 TABLET | Refills: 1 | Status: SHIPPED | OUTPATIENT
Start: 2020-10-29 | End: 2021-05-27

## 2020-10-29 ASSESSMENT — MIFFLIN-ST. JEOR: SCORE: 1569.38

## 2020-10-29 NOTE — NURSING NOTE
"/87 (BP Location: Left arm, Patient Position: Sitting, Cuff Size: Adult Regular)   Pulse 95   Temp 99  F (37.2  C) (Oral)   Resp 20   Ht 1.651 m (5' 5\")   Wt 93.4 kg (205 lb 12.8 oz)   SpO2 96%   BMI 34.25 kg/m      "

## 2020-10-29 NOTE — PROGRESS NOTES
"Subjective     Marlen Solano is a 45 year old female who presents to clinic today for the following health issues:    HPI         ED/UC Followup:    Facility:  [facility] ED  Date of visit: 10/13/2020  Reason for visit: Hypertension, headache and dizziness.   Current Status: Stable     She had stopped her amlodipine in July, did not understand she was supposed to call back with some BP readings and did not realize she could call for refill of med. She just stopped when it ran out. She had checked on the medication and it was 130s/80s. She did not check it after stopping until she started to have dizziness and headache last week and it was 165/90s before going to ED. It was up to 162/106 when she went in. She reports she is taking the amlodipine now and her headache and dizziness have resolved. This headache was similar to the headache she had in June but not as severe.     No other concerns.       Patient Active Problem List   Diagnosis     CARDIOVASCULAR SCREENING; LDL GOAL LESS THAN 130     Atypical lobular hyperplasia (ALH) of right breast     Benign essential hypertension     Current Outpatient Medications   Medication Sig Dispense Refill     amLODIPine (NORVASC) 5 MG tablet Take 1 tablet (5 mg) by mouth daily 90 tablet 1     tamoxifen (NOLVADEX) 20 MG tablet Take 1 tablet (20 mg) by mouth daily (Patient not taking: Reported on 10/29/2020) 90 tablet 3          Review of Systems   No fever, chills, no neurologic symptoms, no chest pain, dyspnea       Objective    /87 (BP Location: Left arm, Patient Position: Sitting, Cuff Size: Adult Regular)   Pulse 95   Temp 99  F (37.2  C) (Oral)   Resp 20   Ht 1.651 m (5' 5\")   Wt 93.4 kg (205 lb 12.8 oz)   SpO2 96%   BMI 34.25 kg/m    Body mass index is 34.25 kg/m .  Physical Exam       Not examined.         Assessment & Plan     Benign essential hypertension  She likely does have true hypertension and the headache is probably due to BP as both " times (June and now), the headache did resolve with treatment and she did well while on med. Recommend continue the amlodpine 5 mg daily and advised she can call for refills when it runs out.   - amLODIPine (NORVASC) 5 MG tablet; Take 1 tablet (5 mg) by mouth daily       Return in about 3 months (around 1/29/2021) for Physical Exam.    Mary Ellen White MD  [facility]

## 2020-11-01 PROBLEM — I10 BENIGN ESSENTIAL HYPERTENSION: Status: ACTIVE | Noted: 2020-11-01

## 2021-02-18 ENCOUNTER — VIRTUAL VISIT (OUTPATIENT)
Dept: ONCOLOGY | Facility: CLINIC | Age: 46
End: 2021-02-18
Payer: COMMERCIAL

## 2021-02-18 DIAGNOSIS — N60.92 ATYPICAL LOBULAR HYPERPLASIA (ALH) OF LEFT BREAST: ICD-10-CM

## 2021-02-18 PROCEDURE — 99213 OFFICE O/P EST LOW 20 MIN: CPT | Mod: 95 | Performed by: INTERNAL MEDICINE

## 2021-02-18 PROCEDURE — 999N001193 HC VIDEO/TELEPHONE VISIT; NO CHARGE

## 2021-02-18 RX ORDER — TAMOXIFEN CITRATE 20 MG/1
20 TABLET ORAL DAILY
Qty: 90 TABLET | Refills: 3 | Status: SHIPPED | OUTPATIENT
Start: 2021-02-18 | End: 2021-10-27

## 2021-02-18 NOTE — PROGRESS NOTES
Marlen is a 45 year old who is being evaluated via a billable telephone visit.      What phone number would you like to be contacted at? [phone]  How would you like to obtain your AVS? Mail a copy

## 2021-02-19 NOTE — PROGRESS NOTES
Visit Date:   02/18/2021      Marlen Solano is a 45-year-old patient who is being evaluated today by a telephone visit due to a public health emergency with coronavirus.  She has given consent.      CHIEF COMPLAINT:  Atypical lobular hyperplasia of the left breast.      HISTORY OF PRESENTING COMPLAINT:  Marlen is 45 years old.  She has a diagnosis of atypical lobular hyperplasia of the right breast.  She had a right-sided lumpectomy and is currently on tamoxifen for prevention.  She seems to be tolerating the tamoxifen well, but she is complaining today of some leg crampiness.  I have discussed with her treatment for that and recommended tonic water, which tends to work well.  She had her last mammogram in 05/2020, so I have ordered another one of those.  I have also refilled her tamoxifen today.  She did have lab work done in 10/2020, which I have reviewed.  She will be due lab work when she comes back.  She denies today any respiratory, cardiovascular, genitourinary, musculoskeletal, or neurological side effects that are worrisome.      REVIEW OF SYSTEMS:  A 10-point comprehensive review of systems is otherwise unremarkable.      MEDICATIONS AND ALLERGIES:  Outlined in the nursing records.      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      PAST MEDICAL HISTORY:     1.  History of atypical lobular hyperplasia.   2.  History of hypertension.      PAIN ASSESSMENT:  She denies pain today.      PHYSICAL EXAMINATION:  Deferred, because this is a telephone visit.      IMPRESSION AND PLAN:  This is a 45-year-old patient with atypical lobular hyperplasia of the right breast, currently on preventative treatment with tamoxifen.  Tamoxifen is causing some leg crampiness.  I have added tonic water for that today, which works well.  She also has a history of hypertension and is on Norvasc.  Overall, she is doing very well from an oncology standpoint.  I am going to see her in person in 6  months with some labs, and I have also ordered a mammogram for 2021, and I have refilled her tamoxifen today.      TELEPHONE CONVERSATION:  15 minutes.         KERRIE PACHECO MD             D: 2021   T: 2021   MT: MARIAM      Name:     SURAJ ANAND   MRN:      [id number]        Account:      [id number]   :      1975           Visit Date:   2021      Document: [id number]

## 2021-05-27 ENCOUNTER — OFFICE VISIT (OUTPATIENT)
Dept: INTERNAL MEDICINE | Facility: CLINIC | Age: 46
End: 2021-05-27
Payer: COMMERCIAL

## 2021-05-27 VITALS
DIASTOLIC BLOOD PRESSURE: 87 MMHG | OXYGEN SATURATION: 95 % | HEART RATE: 90 BPM | RESPIRATION RATE: 20 BRPM | WEIGHT: 203.1 LBS | TEMPERATURE: 98.5 F | SYSTOLIC BLOOD PRESSURE: 131 MMHG | HEIGHT: 65 IN | BODY MASS INDEX: 33.84 KG/M2

## 2021-05-27 DIAGNOSIS — I10 BENIGN ESSENTIAL HYPERTENSION: ICD-10-CM

## 2021-05-27 PROCEDURE — 99213 OFFICE O/P EST LOW 20 MIN: CPT | Performed by: INTERNAL MEDICINE

## 2021-05-27 RX ORDER — AMLODIPINE BESYLATE 5 MG/1
5 TABLET ORAL DAILY
Qty: 90 TABLET | Refills: 1 | Status: SHIPPED | OUTPATIENT
Start: 2021-05-27 | End: 2021-11-24

## 2021-05-27 ASSESSMENT — MIFFLIN-ST. JEOR: SCORE: 1552.14

## 2021-05-27 NOTE — PROGRESS NOTES
Assessment & Plan     Benign essential hypertension  Blood pressures well controlled today though she is not taking the medication every day.  Recommend that she take it every day for now, suggested we could try decreasing the dose but she will stay on the current dose, monitor at home over the next few weeks we can reassess at her physical.  May consider decreasing if blood pressures are running less than 110 at home.  Given her severe headaches though without any medication for a while, I would strongly recommend staying on some medication and advised it is best to take some daily.  - amLODIPine (NORVASC) 5 MG tablet; Take 1 tablet (5 mg) by mouth daily      Return in about 3 weeks (around 6/17/2021) for Physical Exam.    Mary Ellen White MD  [facility]ee is a 46 year old who presents for the following health issues     HPI     Hypertension Follow-up  She reports she has been taking her medication a couple times a week.  She wants to see if she could go without blood pressure medication.  She has not been having any of the headache she had when her blood pressure was high. She needs her medication refilled.     Do you check your blood pressure regularly outside of the clinic? No     Are you following a low salt diet? Yes    Are your blood pressures ever more than 140 on the top number (systolic) OR more   than 90 on the bottom number (diastolic), for example 140/90? No      How many servings of fruits and vegetables do you eat daily?  2-3    On average, how many sweetened beverages do you drink each day (Examples: soda, juice, sweet tea, etc.  Do NOT count diet or artificially sweetened beverages)?   0    How many days per week do you exercise enough to make your heart beat faster? 3 or less    How many minutes a day do you exercise enough to make your heart beat faster? 9 or less  How many days per week do you miss taking your medication? 2    What makes it hard for  "you to take your medications?  Patient decides not to take her medications two times a week        Patient Active Problem List   Diagnosis     CARDIOVASCULAR SCREENING; LDL GOAL LESS THAN 130     Atypical lobular hyperplasia (ALH) of right breast     Benign essential hypertension       Current Outpatient Medications   Medication Sig Dispense Refill     amLODIPine (NORVASC) 5 MG tablet Take 1 tablet (5 mg) by mouth daily 90 tablet 1     tamoxifen (NOLVADEX) 20 MG tablet Take 1 tablet (20 mg) by mouth daily 90 tablet 3         Social History     Tobacco Use     Smoking status: Never Smoker     Smokeless tobacco: Never Used   Substance Use Topics     Alcohol use: No     Alcohol/week: 0.0 standard drinks          Review of Systems   No fever, chills, no dyspnea on exertion, no chest pain, palpitations, PND, orthopnea, edema, syncope, headache, abdominal pain       Objective    /87 (BP Location: Left arm, Patient Position: Sitting, Cuff Size: Adult Large)   Pulse 90   Temp 98.5  F (36.9  C) (Oral)   Resp 20   Ht 1.651 m (5' 5\")   Wt 92.1 kg (203 lb 1.6 oz)   SpO2 95%   BMI 33.80 kg/m    Body mass index is 33.8 kg/m .  Physical Exam           Not examined.         " [Post Hospitalization] : a post hospitalization visit

## 2021-05-27 NOTE — NURSING NOTE
"/87 (BP Location: Left arm, Patient Position: Sitting, Cuff Size: Adult Large)   Pulse 90   Temp 98.5  F (36.9  C) (Oral)   Resp 20   Ht 1.651 m (5' 5\")   Wt 92.1 kg (203 lb 1.6 oz)   SpO2 95%   BMI 33.80 kg/m      "

## 2021-06-02 ENCOUNTER — OFFICE VISIT (OUTPATIENT)
Dept: FAMILY MEDICINE | Facility: CLINIC | Age: 46
End: 2021-06-02
Payer: COMMERCIAL

## 2021-06-02 VITALS
RESPIRATION RATE: 12 BRPM | WEIGHT: 202 LBS | SYSTOLIC BLOOD PRESSURE: 143 MMHG | BODY MASS INDEX: 33.61 KG/M2 | TEMPERATURE: 98.4 F | HEART RATE: 61 BPM | OXYGEN SATURATION: 95 % | DIASTOLIC BLOOD PRESSURE: 86 MMHG

## 2021-06-02 DIAGNOSIS — Z23 NEED FOR IMMUNIZATION AGAINST TYPHOID: ICD-10-CM

## 2021-06-02 DIAGNOSIS — Z71.84 ENCOUNTER FOR COUNSELING FOR TRAVEL: Primary | ICD-10-CM

## 2021-06-02 PROCEDURE — 90691 TYPHOID VACCINE IM: CPT | Performed by: PHYSICIAN ASSISTANT

## 2021-06-02 PROCEDURE — 90471 IMMUNIZATION ADMIN: CPT | Performed by: PHYSICIAN ASSISTANT

## 2021-06-02 PROCEDURE — 99401 PREV MED CNSL INDIV APPRX 15: CPT | Mod: 25 | Performed by: PHYSICIAN ASSISTANT

## 2021-06-02 RX ORDER — MEFLOQUINE HYDROCHLORIDE 250 MG/1
TABLET ORAL
Qty: 12 TABLET | Refills: 0 | Status: SHIPPED | OUTPATIENT
Start: 2021-06-02 | End: 2021-10-27

## 2021-06-02 RX ORDER — AZITHROMYCIN 500 MG/1
TABLET, FILM COATED ORAL
Qty: 3 TABLET | Refills: 0 | Status: SHIPPED | OUTPATIENT
Start: 2021-06-02 | End: 2022-03-29

## 2021-06-02 NOTE — PROGRESS NOTES
SUBJECTIVE: Marlen Solano , a 46 year old  female, presents for counseling and information regarding upcoming travel to [facility]. Special medical concerns include: none. She anticipates the following unusual exposures: none.    Itinerary:  [facility]    Departure Date: 7/8/2021 Return date: 8/19/2021    Reason for travel (i.e. Business, pleasure): pleasure    Visiting an urban or rural area?: both    Accommodations (i.e. hotel, hostel, friends, family, etc): family    Women - First day of your last period: na    IMMUNIZATION HISTORY  Have you received any vaccinations in the past 4 weeks?  No  Have you ever fainted from having your blood drawn or from an injection?  No  Have you ever had a fever reaction to vaccination?  No  Have you ever had any bad reaction or side effect from any vaccination?  No  Have you ever had hepatitis A or B vaccine?  yes  Do you live (or work closely) with anyone who has AIDS, an AIDS-like condition, any other immune disorder or who is on chemotherapy for cancer?  No  Have you received any injection of immune globulin or any blood products during the past 12 months?  No    GENERAL MEDICAL HISTORY  Do you have a medical condition that warrants maintenance medication or physician follow-up?  No  Do you have a medical condition that is stable now, but that may recur while traveling?  No  Has your spleen been removed?  No  Have you had an acute illness or a fever in the past 48 hours?  No  Are you pregnant, or might you become pregnant on this trip?  Any chance of pregnancy?  No  Are you breastfeeding?  No  Do you have HIV, AIDS, an AIDS-like condition, any other immune disorder, leukemia or cancer?  No  Do you have a severe combined immunodeficiency disease?  No  Have you had your thymus gland removed or history of problems with your thymus, such as myasthenia gravis, DiGeorge syndrome, or thymoma?  No    Do you have severe thrombocytopenia (low platelet count) or a  coagulation disorder?  No  Have you ever had a convulsion, seizure, epilepsy, neurologic condition or brain infection?  No  Do you have any stomach conditions?  No  Do you have a G6PD deficiency?  No  Do you have severe renal or kidney impairment?  No  Do you have a history of psychiatric problems?  No  Do you have a problem with strange dreams and/or nightmares?  No  Do you have insomnia?  No  Do you have problems with vaginitis?  No  Do you have psoriasis?  No  Are you prone to motion sickness?  No  Have you ever had headaches, nausea, vomiting, or breathing problems from altitude exposure?  No      Past Medical History:   Diagnosis Date     Lump of right breast       Immunization History   Administered Date(s) Administered     COVID-19,PF,Pfizer 12/29/2020, 01/19/2021     HEPA 09/21/2012     Hep B, Peds or Adolescent 08/15/2003, 09/21/2012     HepA-Adult 06/29/2017     HepA-ped 2 Dose 09/21/2012     HepB 08/15/2003, 09/21/2012     Historical DTP/aP 08/19/2005     Influenza Vaccine IM > 6 months Valent IIV4 10/01/2019     MMR 03/16/2007     Meningococcal (Menactra ) 06/29/2017     Poliovirus, inactivated (IPV) 09/21/2012     Rubella 05/06/2010     TD (ADULT, 7+) 03/16/2007     TDAP Vaccine (Boostrix) 10/02/2013     Td (Adult), Adsorbed 03/16/2007     Typhoid IM 09/21/2012, 06/29/2017     Varicella 03/16/2007     Yellow Fever 09/21/2012       Current Outpatient Medications   Medication Sig Dispense Refill     amLODIPine (NORVASC) 5 MG tablet Take 1 tablet (5 mg) by mouth daily 90 tablet 1     tamoxifen (NOLVADEX) 20 MG tablet Take 1 tablet (20 mg) by mouth daily 90 tablet 3     No Known Allergies     EXAM: deferred    Immunizations discussed include: Typhoid  Malaria prophylaxis recommended: mefloquine  Symptomatic treatment for traveler's diarrhea: bismuth subsalicylate, loperamide/diphenoxylate and azithromycin    ASSESSMENT/PLAN:    (Z71.84) Encounter for counseling for travel  (primary encounter  diagnosis)    Comment: Typhoid vaccines today. Patient will return or follow-up with PCP as needed. Prophylaxis given for Traveler's diarrhea and Malaria. All questions were answered.     Plan: mefloquine (LARIAM) 250 MG tablet, azithromycin        (ZITHROMAX) 500 MG tablet            (Z23) Need for immunization against typhoid  Comment:   Plan: TYPHOID VACCINE, IM              I have reviewed general recommendations for safe travel   including: food/water precautions, insect avoidance, safe sex   practices given high prevalence of HIV and other STDs,   roadway safety. Educational materials and links to the [facility]   Traveler's health website have been provided.    Total time 15 minutes, greater than 50 percent in counseling   and coordination of care.

## 2021-10-27 ENCOUNTER — ONCOLOGY VISIT (OUTPATIENT)
Dept: ONCOLOGY | Facility: CLINIC | Age: 46
End: 2021-10-27
Payer: COMMERCIAL

## 2021-10-27 ENCOUNTER — LAB (OUTPATIENT)
Dept: ONCOLOGY | Facility: CLINIC | Age: 46
End: 2021-10-27
Attending: INTERNAL MEDICINE
Payer: COMMERCIAL

## 2021-10-27 VITALS
BODY MASS INDEX: 34.24 KG/M2 | HEIGHT: 65 IN | TEMPERATURE: 97.7 F | OXYGEN SATURATION: 97 % | RESPIRATION RATE: 16 BRPM | SYSTOLIC BLOOD PRESSURE: 137 MMHG | HEART RATE: 72 BPM | WEIGHT: 205.5 LBS | DIASTOLIC BLOOD PRESSURE: 87 MMHG

## 2021-10-27 DIAGNOSIS — N60.92 ATYPICAL LOBULAR HYPERPLASIA (ALH) OF LEFT BREAST: ICD-10-CM

## 2021-10-27 DIAGNOSIS — N60.92 ATYPICAL LOBULAR HYPERPLASIA (ALH) OF LEFT BREAST: Primary | ICD-10-CM

## 2021-10-27 LAB
ALBUMIN SERPL-MCNC: 3.4 G/DL (ref 3.4–5)
ALP SERPL-CCNC: 81 U/L (ref 40–150)
ALT SERPL W P-5'-P-CCNC: 34 U/L (ref 0–50)
ANION GAP SERPL CALCULATED.3IONS-SCNC: 6 MMOL/L (ref 3–14)
AST SERPL W P-5'-P-CCNC: 21 U/L (ref 0–45)
BILIRUB SERPL-MCNC: 0.4 MG/DL (ref 0.2–1.3)
BUN SERPL-MCNC: 13 MG/DL (ref 7–30)
CALCIUM SERPL-MCNC: 8.4 MG/DL (ref 8.5–10.1)
CHLORIDE BLD-SCNC: 105 MMOL/L (ref 94–109)
CO2 SERPL-SCNC: 29 MMOL/L (ref 20–32)
CREAT SERPL-MCNC: 0.98 MG/DL (ref 0.52–1.04)
ERYTHROCYTE [DISTWIDTH] IN BLOOD BY AUTOMATED COUNT: 16 % (ref 10–15)
GFR SERPL CREATININE-BSD FRML MDRD: 68 ML/MIN/1.73M2
GLUCOSE BLD-MCNC: 174 MG/DL (ref 70–99)
HCT VFR BLD AUTO: 40.2 % (ref 35–47)
HGB BLD-MCNC: 13.8 G/DL (ref 11.7–15.7)
MCH RBC QN AUTO: 26.9 PG (ref 26.5–33)
MCHC RBC AUTO-ENTMCNC: 34.3 G/DL (ref 31.5–36.5)
MCV RBC AUTO: 78 FL (ref 78–100)
PLATELET # BLD AUTO: 358 10E3/UL (ref 150–450)
POTASSIUM BLD-SCNC: 3.7 MMOL/L (ref 3.4–5.3)
PROT SERPL-MCNC: 7.4 G/DL (ref 6.8–8.8)
RBC # BLD AUTO: 5.13 10E6/UL (ref 3.8–5.2)
SODIUM SERPL-SCNC: 140 MMOL/L (ref 133–144)
WBC # BLD AUTO: 5.5 10E3/UL (ref 4–11)

## 2021-10-27 PROCEDURE — 80053 COMPREHEN METABOLIC PANEL: CPT | Performed by: INTERNAL MEDICINE

## 2021-10-27 PROCEDURE — 99213 OFFICE O/P EST LOW 20 MIN: CPT | Performed by: INTERNAL MEDICINE

## 2021-10-27 PROCEDURE — 85014 HEMATOCRIT: CPT | Performed by: INTERNAL MEDICINE

## 2021-10-27 PROCEDURE — 36415 COLL VENOUS BLD VENIPUNCTURE: CPT

## 2021-10-27 PROCEDURE — G0463 HOSPITAL OUTPT CLINIC VISIT: HCPCS

## 2021-10-27 RX ORDER — TAMOXIFEN CITRATE 20 MG/1
20 TABLET ORAL DAILY
Qty: 90 TABLET | Refills: 3 | Status: SHIPPED | OUTPATIENT
Start: 2021-10-27 | End: 2022-04-27

## 2021-10-27 ASSESSMENT — PAIN SCALES - GENERAL: PAINLEVEL: NO PAIN (0)

## 2021-10-27 ASSESSMENT — MIFFLIN-ST. JEOR: SCORE: 1563.02

## 2021-10-27 NOTE — NURSING NOTE
"Oncology Rooming Note    October 27, 2021 2:41 PM   Marlen Solano is a 46 year old female who presents for:    Chief Complaint   Patient presents with     Oncology Clinic Visit     Atypical lobular hyperplasia (ALH) of right breast     Initial Vitals: /87   Pulse 72   Temp 97.7  F (36.5  C) (Tympanic)   Resp 16   Ht 1.651 m (5' 5\")   Wt 93.2 kg (205 lb 8 oz)   SpO2 97%   BMI 34.20 kg/m   Estimated body mass index is 34.2 kg/m  as calculated from the following:    Height as of this encounter: 1.651 m (5' 5\").    Weight as of this encounter: 93.2 kg (205 lb 8 oz). Body surface area is 2.07 meters squared.  No Pain (0) Comment: Data Unavailable   No LMP recorded. Patient is postmenopausal.  Allergies reviewed: Yes  Medications reviewed: Yes    Medications: Medication refills not needed today.  Pharmacy name entered into EPIC:    [facility] [place], [place] - [address] [id number] [place], [place] - [address] [facility] [address] [facility] [place], [place] - [facility] [address]  [facility] [address] [facility]    Clinical concerns: f/u       Lisette Dailey CMA              "

## 2021-10-27 NOTE — PROGRESS NOTES
Medical Assistant Note:  Marlen Solano presents today for blood draw.    Patient seen by provider today: Yes: Dr. Franks.   present during visit today: Not Applicable.    Concerns: No Concerns.    Procedure:  Labs drawn    Post Assessment:  Labs drawn without difficulty: Yes.    Discharge Plan:  Departure Mode: Ambulatory.    Face to Face Time: 10 min.    Karo Bermeo CMA

## 2021-10-28 NOTE — PROGRESS NOTES
Visit Date: 10/27/2021    REASON FOR VISIT:  Marlen Solano is a 46-year-old patient who is here today for interim followup.    CHIEF COMPLAINT:  Atypical lobular hyperplasia of the right breast.    HISTORY OF PRESENTING COMPLAINT:  Marlen is 46 years old and was diagnosed with a right-sided atypical lobular hyperplasia.  She is currently on tamoxifen for prevention.  She is tolerating the tamoxifen quite well.  She gets some mild hot flashes.  She was also getting some leg cramping; this seems to have dissipated.  She has got no major complaints today.  I have ordered a mammogram, which she needs to get done in the next month or so.  I have discussed that with her today.  I have also refilled her tamoxifen.  She is due also for some routine blood work today.    REVIEW OF SYSTEMS:  A 10-point comprehensive review of systems is otherwise unremarkable.    MEDICATIONS AND ALLERGIES:  Outlined in the nursing records.    SOCIAL HISTORY:  The patient works at full time healthcare.  She is a nonsmoker.    PAST MEDICAL HISTORY:    1.  History of atypical lobular hyperplasia.  2.  History of hypertension.    FAMILY HISTORY:  Unremarkable for breast cancer.    PAIN ASSESSMENT:  She denies pain today.    PHYSICAL EXAMINATION:    GENERAL:  She is well-appearing lady in no acute distress.  VITAL SIGNS:  Stable.  NECK:  Has no masses or goiter.  CHEST:  Clear to auscultation and percussion bilaterally.  HEART SOUNDS:  1, 2, normal.  No added sounds, no murmurs.  BREASTS:  The patient is status post a right-sided lumpectomy The scar looks good.  No further palpable masses.  Right axilla negative.  Left breast normal, no palpable masses.  Left axillae negative.  ABDOMEN:  Soft and nontender.  No hepatosplenomegaly.  NEUROLOGICAL:  She is alert and oriented x 3.    IMPRESSION:  A 46-year-old patient with atypical lobular hyperplasia of the right breast, on preventative treatment with adjuvant tamoxifen.  She has done about 2 years  of the tamoxifen coming up in 2022.  She has got 3 more years to go.  I have refilled that today.  I will see her back again in 6 months.  We will check her blood work.  She is also due for a routine mammogram.      Linda Franks MD        D: 10/27/2021   T: 10/27/2021   MT: [facility]    Name:     SURAJ ANAND  MRN:      [id number]        Account:    [id number]   :      1975           Visit Date: 10/27/2021     Document: [id number]

## 2021-11-24 DIAGNOSIS — I10 BENIGN ESSENTIAL HYPERTENSION: ICD-10-CM

## 2021-11-24 RX ORDER — AMLODIPINE BESYLATE 5 MG/1
5 TABLET ORAL DAILY
Qty: 90 TABLET | Refills: 0 | Status: SHIPPED | OUTPATIENT
Start: 2021-11-24 | End: 2022-02-01

## 2021-11-24 NOTE — TELEPHONE ENCOUNTER
Amlodipine refill request. Message states she is out of medication.   Pt due for follow up appt.     Provider approval needed.     BP Readings from Last 3 Encounters:   10/27/21 137/87   06/02/21 (!) 143/86   05/27/21 131/87

## 2022-02-01 DIAGNOSIS — I10 BENIGN ESSENTIAL HYPERTENSION: ICD-10-CM

## 2022-02-01 RX ORDER — AMLODIPINE BESYLATE 5 MG/1
5 TABLET ORAL DAILY
Qty: 90 TABLET | Refills: 0 | Status: SHIPPED | OUTPATIENT
Start: 2022-02-01 | End: 2022-03-29

## 2022-02-01 NOTE — TELEPHONE ENCOUNTER
Pending Prescriptions:                       Disp   Refills    amLODIPine (NORVASC) 5 MG tablet          90 tab*0            Sig: Take 1 tablet (5 mg) by mouth daily    Prescription approved per [facility] Refill Protocol.        Victorina TOWNSEND RN   [facility]

## 2022-03-15 ENCOUNTER — OFFICE VISIT (OUTPATIENT)
Dept: URGENT CARE | Facility: URGENT CARE | Age: 47
End: 2022-03-15
Payer: COMMERCIAL

## 2022-03-15 ENCOUNTER — ANCILLARY PROCEDURE (OUTPATIENT)
Dept: GENERAL RADIOLOGY | Facility: CLINIC | Age: 47
End: 2022-03-15
Attending: PHYSICIAN ASSISTANT
Payer: COMMERCIAL

## 2022-03-15 VITALS
SYSTOLIC BLOOD PRESSURE: 155 MMHG | HEART RATE: 72 BPM | BODY MASS INDEX: 33.45 KG/M2 | DIASTOLIC BLOOD PRESSURE: 93 MMHG | OXYGEN SATURATION: 98 % | TEMPERATURE: 97.8 F | WEIGHT: 201 LBS

## 2022-03-15 DIAGNOSIS — R23.4 INDURATION, SKIN: ICD-10-CM

## 2022-03-15 DIAGNOSIS — R23.4 INDURATION, SKIN: Primary | ICD-10-CM

## 2022-03-15 PROCEDURE — 73130 X-RAY EXAM OF HAND: CPT | Mod: LT | Performed by: RADIOLOGY

## 2022-03-15 PROCEDURE — 99213 OFFICE O/P EST LOW 20 MIN: CPT | Performed by: PHYSICIAN ASSISTANT

## 2022-03-15 RX ORDER — SULFAMETHOXAZOLE/TRIMETHOPRIM 800-160 MG
1 TABLET ORAL 2 TIMES DAILY
Qty: 14 TABLET | Refills: 0 | Status: SHIPPED | OUTPATIENT
Start: 2022-03-15 | End: 2022-03-22

## 2022-03-15 NOTE — PATIENT INSTRUCTIONS
Patient Education     Cellulitis  Cellulitis is an infection of the deep layers of skin. A break in the skin, such as a cut or scratch, can let bacteria under the skin. If the bacteria get to deep layers of the skin, it can be serious. If not treated, cellulitis can get into the bloodstream and lymph nodes. The infection can then spread throughout the body. This causes serious illness.   Cellulitis causes the affected skin to become red, swollen, warm, and sore. The reddened areas have a visible border. An open sore may leak fluid (pus). You may have a fever, chills, and pain.   Cellulitis is treated with antibiotics taken for 7 to 10 days. An open sore may be cleaned and covered with cool wet gauze. Symptoms should get better 1 to 2 days after treatment is started. Make sure to take all the antibiotics for the full number of days until they are gone. Keep taking the medicine even if your symptoms go away.   Home care  Follow these tips:    Limit the use of the part of your body with cellulitis.     If the infection is on your leg, keep your leg raised while sitting. This helps reduce swelling.    Take all of the antibiotic medicine exactly as directed until it is gone. Don't miss any doses, especially during the first 7 days. Don t stop taking the medicine when your symptoms get better.    Keep the affected area clean and dry.    Wash your hands with soap and clean, running water before and after touching your skin. Anyone else who touches your skin should also wash his or her hands. Don't share towels.  Follow-up care  Follow up with your healthcare provider, or as advised. If your infection doesn't go away on the first antibiotic, your healthcare provider will prescribe a different one.   When to seek medical advice  Call your healthcare provider right away if any of these occur:    Red areas that spread    Swelling or pain that gets worse    Fluid leaking from the skin (pus)    Fever higher of 100.4  F (38.0   C) or higher after 2 days on antibiotics  [employer] last reviewed this educational content on 8/1/2019 2000-2021 [employer]. All rights reserved. This information is not intended as a substitute for professional medical care. Always follow your healthcare professional's instructions.

## 2022-03-15 NOTE — PROGRESS NOTES
SUBJECTIVE:  Marlen Solano is a 46 year old female who presents to the clinic today for a rash.  Onset of rash was 3 day(s) ago.   Rash is gradual onset.  Location of the rash: finger.  Quality/symptoms of rash: discharge and red   Symptoms are mild and rash seems to be stable.  Previous history of a similar rash? No  Recent exposure history: none known    Associated symptoms include: nothing.    Past Medical History:   Diagnosis Date     Lump of right breast      Current Outpatient Medications   Medication Sig Dispense Refill     sulfamethoxazole-trimethoprim (BACTRIM DS) 800-160 MG tablet Take 1 tablet by mouth 2 times daily for 7 days 14 tablet 0     amLODIPine (NORVASC) 5 MG tablet Take 1 tablet (5 mg) by mouth daily 90 tablet 0     azithromycin (ZITHROMAX) 500 MG tablet Take one tablet daily for up to 3 days as needed for traveler's diarrhea 3 tablet 0     tamoxifen (NOLVADEX) 20 MG tablet Take 1 tablet (20 mg) by mouth daily 90 tablet 3     Social History     Tobacco Use     Smoking status: Never Smoker     Smokeless tobacco: Never Used   Substance Use Topics     Alcohol use: No     Alcohol/week: 0.0 standard drinks       ROS:  Review of systems negative except as stated above.    EXAM:   BP (!) 155/93 (BP Location: Right arm, Patient Position: Sitting, Cuff Size: Adult Regular)   Pulse 72   Temp 97.8  F (36.6  C)   Wt 91.2 kg (201 lb)   SpO2 98%   Breastfeeding No   BMI 33.45 kg/m    GENERAL: alert, no acute distress.  SKIN: Erythema and induration around small cut, warm.  No fluctuance  GENERAL APPEARANCE: healthy, alert and no distress  RESP: lungs clear to auscultation - no rales, rhonchi or wheezes  CV: regular rates and rhythm, normal S1 S2, no murmur noted  NEURO: Normal strength and tone, sensory exam grossly normal,  normal speech and mentation      X-ray image initially interpreted in clinic by me in order to rule out foreign body.  No evidence appreciated.    ASSESSMENT:  (R23.4)  Induration, skin  (primary encounter diagnosis)  Plan: XR Hand Left G/E 3 Views,         sulfamethoxazole-trimethoprim (BACTRIM DS)         800-160 MG tablet, Wrist/Arm/Hand Supplies         Order for DME - ONLY FOR DME      Red flags and emergent follow up discussed, and understood by patient  Follow up with PCP if symptoms worsen or fail to improve      Patient Instructions     Patient Education     Cellulitis  Cellulitis is an infection of the deep layers of skin. A break in the skin, such as a cut or scratch, can let bacteria under the skin. If the bacteria get to deep layers of the skin, it can be serious. If not treated, cellulitis can get into the bloodstream and lymph nodes. The infection can then spread throughout the body. This causes serious illness.   Cellulitis causes the affected skin to become red, swollen, warm, and sore. The reddened areas have a visible border. An open sore may leak fluid (pus). You may have a fever, chills, and pain.   Cellulitis is treated with antibiotics taken for 7 to 10 days. An open sore may be cleaned and covered with cool wet gauze. Symptoms should get better 1 to 2 days after treatment is started. Make sure to take all the antibiotics for the full number of days until they are gone. Keep taking the medicine even if your symptoms go away.   Home care  Follow these tips:    Limit the use of the part of your body with cellulitis.     If the infection is on your leg, keep your leg raised while sitting. This helps reduce swelling.    Take all of the antibiotic medicine exactly as directed until it is gone. Don't miss any doses, especially during the first 7 days. Don t stop taking the medicine when your symptoms get better.    Keep the affected area clean and dry.    Wash your hands with soap and clean, running water before and after touching your skin. Anyone else who touches your skin should also wash his or her hands. Don't share towels.  Follow-up care  Follow up with your  healthcare provider, or as advised. If your infection doesn't go away on the first antibiotic, your healthcare provider will prescribe a different one.   When to seek medical advice  Call your healthcare provider right away if any of these occur:    Red areas that spread    Swelling or pain that gets worse    Fluid leaking from the skin (pus)    Fever higher of 100.4  F (38.0  C) or higher after 2 days on antibiotics  [employer] last reviewed this educational content on 8/1/2019 2000-2021 [employer]. All rights reserved. This information is not intended as a substitute for professional medical care. Always follow your healthcare professional's instructions.

## 2022-03-29 ENCOUNTER — OFFICE VISIT (OUTPATIENT)
Dept: INTERNAL MEDICINE | Facility: CLINIC | Age: 47
End: 2022-03-29
Payer: COMMERCIAL

## 2022-03-29 DIAGNOSIS — Z12.11 SCREEN FOR COLON CANCER: ICD-10-CM

## 2022-03-29 DIAGNOSIS — Z11.59 SCREENING FOR VIRAL DISEASE: ICD-10-CM

## 2022-03-29 DIAGNOSIS — R73.09 BLOOD GLUCOSE ABNORMAL: ICD-10-CM

## 2022-03-29 DIAGNOSIS — Z12.31 ENCOUNTER FOR SCREENING MAMMOGRAM FOR BREAST CANCER: ICD-10-CM

## 2022-03-29 DIAGNOSIS — Z13.6 CARDIOVASCULAR SCREENING; LDL GOAL LESS THAN 130: ICD-10-CM

## 2022-03-29 DIAGNOSIS — Z12.4 CERVICAL CANCER SCREENING: ICD-10-CM

## 2022-03-29 DIAGNOSIS — Z00.00 ENCOUNTER FOR ROUTINE ADULT HEALTH EXAMINATION WITHOUT ABNORMAL FINDINGS: Primary | ICD-10-CM

## 2022-03-29 DIAGNOSIS — I10 BENIGN ESSENTIAL HYPERTENSION: ICD-10-CM

## 2022-03-29 PROCEDURE — 99396 PREV VISIT EST AGE 40-64: CPT | Performed by: INTERNAL MEDICINE

## 2022-03-29 PROCEDURE — 87624 HPV HI-RISK TYP POOLED RSLT: CPT | Performed by: INTERNAL MEDICINE

## 2022-03-29 PROCEDURE — G0145 SCR C/V CYTO,THINLAYER,RESCR: HCPCS | Performed by: INTERNAL MEDICINE

## 2022-03-29 PROCEDURE — 99213 OFFICE O/P EST LOW 20 MIN: CPT | Mod: 25 | Performed by: INTERNAL MEDICINE

## 2022-03-29 RX ORDER — AMLODIPINE BESYLATE 5 MG/1
5 TABLET ORAL DAILY
Qty: 90 TABLET | Refills: 3 | Status: SHIPPED | OUTPATIENT
Start: 2022-03-29 | End: 2023-04-10

## 2022-03-29 ASSESSMENT — ENCOUNTER SYMPTOMS
ABDOMINAL PAIN: 0
SHORTNESS OF BREATH: 0
BREAST MASS: 0
DIARRHEA: 0
FREQUENCY: 0
FEVER: 0
CHILLS: 0
PALPITATIONS: 0
CONSTIPATION: 0
HEMATOCHEZIA: 0
WEAKNESS: 0
HEARTBURN: 0
HEADACHES: 0
COUGH: 0
NAUSEA: 0
SORE THROAT: 0
PARESTHESIAS: 0
HEMATURIA: 0
NERVOUS/ANXIOUS: 0
DYSURIA: 0
ARTHRALGIAS: 0
JOINT SWELLING: 0
DIZZINESS: 0
EYE PAIN: 0
MYALGIAS: 0

## 2022-03-29 NOTE — PROGRESS NOTES
SUBJECTIVE:   CC: Marlen Solano is an 46 year old woman who presents for preventive health visit.       Patient has been advised of split billing requirements and indicates understanding: Yes  Healthy Habits:     Getting at least 3 servings of Calcium per day:  Yes    Bi-annual eye exam:  NO    Dental care twice a year:  Yes    Sleep apnea or symptoms of sleep apnea:  Excessive snoring    Diet:  Regular (no restrictions)    Frequency of exercise:  None    Taking medications regularly:  Yes    Medication side effects:  None    PHQ-2 Total Score: 0    Additional concerns today:  No    Ability to successfully perform activities of daily living: Yes, no assistance needed  Home safety:  none identified   Hearing impairment: None       Problems:   1. HTN: does not check BPs  2. Atypical lobular hyperplasia: stable on med, due for mammogram.       Patient Active Problem List   Diagnosis     CARDIOVASCULAR SCREENING; LDL GOAL LESS THAN 130     Atypical lobular hyperplasia (ALH) of right breast     Benign essential hypertension     Current Outpatient Medications   Medication Sig Dispense Refill     amLODIPine (NORVASC) 5 MG tablet Take 1 tablet (5 mg) by mouth daily 90 tablet 3     tamoxifen (NOLVADEX) 20 MG tablet Take 1 tablet (20 mg) by mouth daily 90 tablet 3          Today's PHQ-2 Score:   PHQ-2 ( 1999 Pfizer) 5/27/2021   Q1: Little interest or pleasure in doing things 0   Q2: Feeling down, depressed or hopeless 0   PHQ-2 Score 0   PHQ-2 Total Score (12-17 Years)- Positive if 3 or more points; Administer PHQ-A if positive 0   Q1: Little interest or pleasure in doing things -   Q2: Feeling down, depressed or hopeless -   PHQ-2 Score -       Abuse: Current or Past (Physical, Sexual or Emotional) - No  Do you feel safe in your environment? Yes    Have you ever done Advance Care Planning? (For example, a Health Directive, POLST, or a discussion with a medical provider or your loved ones about your wishes): No, advance  care planning information given to patient to review.  Patient plans to discuss their wishes with loved ones or provider.      Social History     Tobacco Use     Smoking status: Never Smoker     Smokeless tobacco: Never Used   Substance Use Topics     Alcohol use: No     Alcohol/week: 0.0 standard drinks     If you drink alcohol do you typically have >3 drinks per day or >7 drinks per week? No    Alcohol Use 12/26/2019   Prescreen: >3 drinks/day or >7 drinks/week? No   Prescreen: >3 drinks/day or >7 drinks/week? -   No flowsheet data found.    Reviewed orders with patient.  Reviewed health maintenance and updated orders accordingly - Yes      Breast Cancer Screening:  Any new diagnosis of family breast, ovarian, or bowel cancer? No    FHS-7: No flowsheet data found.  click delete button to remove this line now  Mammogram Screening: Recommended annual mammography  Pertinent mammograms are reviewed under the imaging tab.    History of abnormal Pap smear: NO - age 30-65 PAP every 5 years with negative HPV co-testing recommended  PAP / HPV Latest Ref Rng & Units 6/22/2016   PAP (Historical) - NIL   HPV16 NEG Negative   HPV18 NEG Negative   HRHPV NEG Negative     Reviewed and updated as needed this visit by clinical staff    Allergies  Meds              Reviewed and updated as needed this visit by Provider                     Review of Systems   Constitutional: Negative for chills and fever.   HENT: Negative for congestion, ear pain, hearing loss and sore throat.    Eyes: Positive for visual disturbance. Negative for pain.   Respiratory: Negative for cough and shortness of breath.    Cardiovascular: Negative for chest pain, palpitations and peripheral edema.   Gastrointestinal: Negative for abdominal pain, constipation, diarrhea, heartburn, hematochezia and nausea.   Breasts:  Negative for tenderness, breast mass and discharge.   Genitourinary: Negative for dysuria, frequency, genital sores, hematuria, pelvic pain,  "urgency, vaginal bleeding and vaginal discharge.   Musculoskeletal: Negative for arthralgias, joint swelling and myalgias.   Skin: Negative for rash.   Neurological: Negative for dizziness, weakness, headaches and paresthesias.   Psychiatric/Behavioral: Negative for mood changes. The patient is not nervous/anxious.           OBJECTIVE:   /82 (BP Location: Left arm, Patient Position: Sitting, Cuff Size: Adult Large)   Pulse 89   Temp 98.1  F (36.7  C) (Oral)   Resp 16   Ht 1.664 m (5' 5.5\")   Wt 91.4 kg (201 lb 8 oz)   SpO2 96%   BMI 33.02 kg/m    Physical Exam      HEENT: extraocular movements are intact, pupils equal and reactive to light and accommodation, TMs clear  NECK: Neck supple. No adenopathy. Thyroid symmetric, normal size,, Carotids without bruits.  PULMONARY: clear to auscultation  CARDIAC: regular rate and rhythm and no murmurs, clicks, or gallops  PULSES: 2/2 throughout  BACK: no spinal or CVAT  ABDOMINAL: Soft, nontender.  Normal bowel sounds.  No hepatosplenomegaly or abnormal masses  BREAST: No breast masses or tenderness, No axillary masses or tenderness and No galactorrhea  PELVIC: external genitalia normal, vaginal mucosa normal, cervix normal, specimen sent for pap, bimanual exam with normal uterus and adnexa, rectovaginal exam unremarkable  REFLEXES: 2+ throughout      ASSESSMENT/PLAN:   (Z00.00) Encounter for routine adult health examination without abnormal findings  (primary encounter diagnosis)  Comment: due for colonoscopy, mammogram, labs   Plan:     (I10) Benign essential hypertension  Comment: controlled  Plan: amLODIPine (NORVASC) 5 MG tablet, Basic         metabolic panel  (Ca, Cl, CO2, Creat, Gluc, K,         Na, BUN), Albumin Random Urine Quantitative         with Creat Ratio            (R73.09) Blood glucose abnormal  Comment: recheck   Plan: Basic metabolic panel  (Ca, Cl, CO2, Creat,         Gluc, K, Na, BUN), Hemoglobin A1c            (Z13.6) CARDIOVASCULAR " "SCREENING; LDL GOAL LESS THAN 130  Comment:   Plan: Lipid panel reflex to direct LDL Fasting            (Z12.11) Screen for colon cancer  Comment:   Plan: Adult Gastro Ref - Procedure Only            (Z12.4) Cervical cancer screening  Comment:   Plan: Pap Screen with HPV - recommended age 30 - 65         years, HPV Hold (Lab Only), HPV High Risk Types        DNA Cervical            (Z12.31) Encounter for screening mammogram for breast cancer  Comment:   Plan:     (Z11.59) Screening for viral disease  Comment:  Plan: Hepatitis C Screen Reflex to HCV RNA Quant and         Genotype              Patient has been advised of split billing requirements and indicates understanding: Yes    COUNSELING:  Reviewed preventive health counseling, as reflected in patient instructions    Estimated body mass index is 33.45 kg/m  as calculated from the following:    Height as of 10/27/21: 1.651 m (5' 5\").    Weight as of 3/15/22: 91.2 kg (201 lb).    Weight management plan: Discussed healthy diet and exercise guidelines    She reports that she has never smoked. She has never used smokeless tobacco.      Counseling Resources:  ATP IV Guidelines  Pooled Cohorts Equation Calculator  Breast Cancer Risk Calculator  BRCA-Related Cancer Risk Assessment: FHS-7 Tool  FRAX Risk Assessment  ICSI Preventive Guidelines  Dietary Guidelines for Americans, 2010  USDA's MyPlate  ASA Prophylaxis  Lung CA Screening    Mary Ellen White MD  [facility]  "

## 2022-03-29 NOTE — NURSING NOTE
"BP (!) 140/92 (BP Location: Left arm, Patient Position: Sitting, Cuff Size: Adult Large)   Pulse 89   Temp 98.1  F (36.7  C) (Oral)   Resp 16   Ht 1.664 m (5' 5.5\")   Wt 91.4 kg (201 lb 8 oz)   SpO2 96%   BMI 33.02 kg/m      "

## 2022-04-01 LAB
BKR LAB AP GYN ADEQUACY: NORMAL
BKR LAB AP GYN INTERPRETATION: NORMAL
BKR LAB AP HPV REFLEX: NORMAL
BKR LAB AP PREVIOUS ABNORMAL: NORMAL
PATH REPORT.COMMENTS IMP SPEC: NORMAL
PATH REPORT.COMMENTS IMP SPEC: NORMAL
PATH REPORT.RELEVANT HX SPEC: NORMAL

## 2022-04-05 LAB
HUMAN PAPILLOMA VIRUS 16 DNA: NEGATIVE
HUMAN PAPILLOMA VIRUS 18 DNA: NEGATIVE
HUMAN PAPILLOMA VIRUS FINAL DIAGNOSIS: NORMAL
HUMAN PAPILLOMA VIRUS OTHER HR: NEGATIVE

## 2022-04-06 ENCOUNTER — LAB (OUTPATIENT)
Dept: LAB | Facility: CLINIC | Age: 47
End: 2022-04-06
Payer: COMMERCIAL

## 2022-04-06 DIAGNOSIS — Z13.6 CARDIOVASCULAR SCREENING; LDL GOAL LESS THAN 130: ICD-10-CM

## 2022-04-06 DIAGNOSIS — R73.09 BLOOD GLUCOSE ABNORMAL: ICD-10-CM

## 2022-04-06 DIAGNOSIS — Z11.59 SCREENING FOR VIRAL DISEASE: ICD-10-CM

## 2022-04-06 DIAGNOSIS — I10 BENIGN ESSENTIAL HYPERTENSION: ICD-10-CM

## 2022-04-06 LAB — HBA1C MFR BLD: 7.4 % (ref 0–5.6)

## 2022-04-06 PROCEDURE — 80048 BASIC METABOLIC PNL TOTAL CA: CPT

## 2022-04-06 PROCEDURE — 82043 UR ALBUMIN QUANTITATIVE: CPT

## 2022-04-06 PROCEDURE — 83036 HEMOGLOBIN GLYCOSYLATED A1C: CPT

## 2022-04-06 PROCEDURE — 86803 HEPATITIS C AB TEST: CPT

## 2022-04-06 PROCEDURE — 80061 LIPID PANEL: CPT

## 2022-04-06 PROCEDURE — 36415 COLL VENOUS BLD VENIPUNCTURE: CPT

## 2022-04-07 LAB
ANION GAP SERPL CALCULATED.3IONS-SCNC: 3 MMOL/L (ref 3–14)
BUN SERPL-MCNC: 11 MG/DL (ref 7–30)
CALCIUM SERPL-MCNC: 8.7 MG/DL (ref 8.5–10.1)
CHLORIDE BLD-SCNC: 108 MMOL/L (ref 94–109)
CHOLEST SERPL-MCNC: 221 MG/DL
CO2 SERPL-SCNC: 29 MMOL/L (ref 20–32)
CREAT SERPL-MCNC: 0.92 MG/DL (ref 0.52–1.04)
CREAT UR-MCNC: 248 MG/DL
FASTING STATUS PATIENT QL REPORTED: YES
GFR SERPL CREATININE-BSD FRML MDRD: 76 ML/MIN/1.73M2
GLUCOSE BLD-MCNC: 130 MG/DL (ref 70–99)
HCV AB SERPL QL IA: NONREACTIVE
HDLC SERPL-MCNC: 52 MG/DL
LDLC SERPL CALC-MCNC: 152 MG/DL
MICROALBUMIN UR-MCNC: 19 MG/L
MICROALBUMIN/CREAT UR: 7.66 MG/G CR (ref 0–25)
NONHDLC SERPL-MCNC: 169 MG/DL
POTASSIUM BLD-SCNC: 3.6 MMOL/L (ref 3.4–5.3)
SODIUM SERPL-SCNC: 140 MMOL/L (ref 133–144)
TRIGL SERPL-MCNC: 87 MG/DL

## 2022-04-09 VITALS
RESPIRATION RATE: 16 BRPM | SYSTOLIC BLOOD PRESSURE: 134 MMHG | WEIGHT: 201.5 LBS | HEIGHT: 66 IN | BODY MASS INDEX: 32.38 KG/M2 | TEMPERATURE: 98.1 F | OXYGEN SATURATION: 96 % | DIASTOLIC BLOOD PRESSURE: 82 MMHG | HEART RATE: 89 BPM

## 2022-04-27 ENCOUNTER — ONCOLOGY VISIT (OUTPATIENT)
Dept: ONCOLOGY | Facility: CLINIC | Age: 47
End: 2022-04-27
Attending: INTERNAL MEDICINE
Payer: COMMERCIAL

## 2022-04-27 VITALS
WEIGHT: 199.3 LBS | HEIGHT: 66 IN | BODY MASS INDEX: 32.03 KG/M2 | HEART RATE: 69 BPM | OXYGEN SATURATION: 98 % | TEMPERATURE: 98.2 F | DIASTOLIC BLOOD PRESSURE: 92 MMHG | RESPIRATION RATE: 16 BRPM | SYSTOLIC BLOOD PRESSURE: 148 MMHG

## 2022-04-27 DIAGNOSIS — N60.92 ATYPICAL LOBULAR HYPERPLASIA (ALH) OF LEFT BREAST: Primary | ICD-10-CM

## 2022-04-27 PROCEDURE — 99213 OFFICE O/P EST LOW 20 MIN: CPT | Performed by: INTERNAL MEDICINE

## 2022-04-27 RX ORDER — TAMOXIFEN CITRATE 20 MG/1
20 TABLET ORAL DAILY
Qty: 90 TABLET | Refills: 3 | Status: SHIPPED | OUTPATIENT
Start: 2022-04-27 | End: 2023-07-20

## 2022-04-27 ASSESSMENT — PAIN SCALES - GENERAL: PAINLEVEL: NO PAIN (0)

## 2022-04-27 NOTE — NURSING NOTE
"Oncology Rooming Note    April 27, 2022 4:16 PM   Marlen Solano is a 46 year old female who presents for:    No chief complaint on file.    Initial Vitals: BP (!) 148/92   Pulse 69   Temp 98.2  F (36.8  C) (Tympanic)   Resp 16   Ht 1.664 m (5' 5.5\")   Wt 90.4 kg (199 lb 4.8 oz)   SpO2 98%   BMI 32.66 kg/m   Estimated body mass index is 32.66 kg/m  as calculated from the following:    Height as of this encounter: 1.664 m (5' 5.5\").    Weight as of this encounter: 90.4 kg (199 lb 4.8 oz). Body surface area is 2.04 meters squared.  No Pain (0) Comment: Data Unavailable   No LMP recorded. Patient is postmenopausal.  Allergies reviewed: No  Medications reviewed: Yes    Medications: Medication refills not needed today.  Pharmacy name entered into EPIC:    [facility] [address] [id number] [place], [place] - [address] [facility] [address] [facility] [address] PHARMACY [place], [place] - [facility] [address]  [facility] [id number] [facility]    Clinical concerns: f/u       Lisette Dailey CMA                "

## 2022-04-27 NOTE — LETTER
4/27/2022         RE: Marlen Solano  [address] [place] [address]        Dear Colleague,    Thank you for referring your patient, Marlen Solano, to the [facility]. Please see a copy of my visit note below.    No notes on file    Again, thank you for allowing me to participate in the care of your patient.        Sincerely,        Linda Franks MD

## 2022-06-17 NOTE — PROGRESS NOTES
Visit Date: 04/27/2022    Marlen Solano is a 47-year-old patient who is here today for interim followup.    CHIEF COMPLAINT:  Atypical lobular hyperplasia of the right breast.    Marlen is a 46-year-old patient with a diagnosis of right-sided atypical lobular hyperplasia and she is currently on tamoxifen for prevention.  She is tolerating the tamoxifen well without any real major issues with it.  We have discussed side effect profile today, and I have refilled her medication.  She is also on Norvasc, which is her only other medication.     REVIEW OF SYSTEMS:  She denies cough, shortness of breath, bone pain today.  A 12-point comprehensive review of systems is otherwise unremarkable.     She just had lab work done on 04/06/2022 and I have reviewed those for her today.    MEDICATIONS AND ALLERGIES:  Outlined in the nursing records.    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    PAST MEDICAL HISTORY:  History of hypertension, history of atypical lobular hyperplasia.    FAMILY HISTORY:  Unremarkable for breast cancer.    PHYSICAL ASSESSMENT:    GENERAL:  She is well-appearing lady in no acute distress.  VITAL SIGNS:  Stable.    NECK:  No masses or goiter.  CHEST:  Clear to auscultation and percussion bilaterally.  HEART:  Heart sounds 1 2, normal.  No added sounds, no murmurs.  BREASTS:  The patient is status post right-sided lumpectomy.  The scar looks good.  No further palpable masses.  Right axilla negative.  Left breast normal, no palpable masses.  Left axilla negative.  ABDOMEN:  Soft and nontender.  No hepatosplenomegaly.   EXTREMITIES:  Legs without tenderness or edema.   NEUROLOGIC:  The patient is alert and oriented x3.    DATA REVIEW:  I have reviewed labs from earlier this month.     ASSESSMENT:  A 47-year-old patient with atypical lobular hyperplasia of the right breast.  Continuing on a preventative treatment with tamoxifen.  She has been on the tamoxifen about  2-1/2 years.  Overall, she is doing well from my standpoint.  I will see her back again in 6 months.  She will get a mammogram in the next few weeks.    Linda Franks MD        D: 2022   T: 2022   MT: SB    Name:     SURAJ ANAND  MRN:      [id number]        Account:    [id number]   :      1975           Visit Date: 2022     Document: [id number]

## 2022-12-28 ENCOUNTER — OFFICE VISIT (OUTPATIENT)
Dept: URGENT CARE | Facility: URGENT CARE | Age: 47
End: 2022-12-28
Payer: COMMERCIAL

## 2022-12-28 VITALS
WEIGHT: 203 LBS | DIASTOLIC BLOOD PRESSURE: 86 MMHG | OXYGEN SATURATION: 98 % | HEART RATE: 73 BPM | TEMPERATURE: 98.7 F | SYSTOLIC BLOOD PRESSURE: 143 MMHG | BODY MASS INDEX: 33.27 KG/M2

## 2022-12-28 DIAGNOSIS — R19.7 DIARRHEA, UNSPECIFIED TYPE: ICD-10-CM

## 2022-12-28 DIAGNOSIS — R10.84 ABDOMINAL PAIN, GENERALIZED: Primary | ICD-10-CM

## 2022-12-28 LAB
ALBUMIN SERPL-MCNC: 3.1 G/DL (ref 3.4–5)
ALP SERPL-CCNC: 61 U/L (ref 40–150)
ALT SERPL W P-5'-P-CCNC: 19 U/L (ref 0–50)
ANION GAP SERPL CALCULATED.3IONS-SCNC: 3 MMOL/L (ref 3–14)
AST SERPL W P-5'-P-CCNC: 20 U/L (ref 0–45)
BILIRUB SERPL-MCNC: 0.7 MG/DL (ref 0.2–1.3)
BUN SERPL-MCNC: 13 MG/DL (ref 7–30)
CALCIUM SERPL-MCNC: 9.4 MG/DL (ref 8.5–10.1)
CHLORIDE BLD-SCNC: 105 MMOL/L (ref 94–109)
CO2 SERPL-SCNC: 31 MMOL/L (ref 20–32)
CREAT SERPL-MCNC: 1 MG/DL (ref 0.52–1.04)
GFR SERPL CREATININE-BSD FRML MDRD: 69 ML/MIN/1.73M2
GLUCOSE BLD-MCNC: 116 MG/DL (ref 70–99)
POTASSIUM BLD-SCNC: 3.7 MMOL/L (ref 3.4–5.3)
PROT SERPL-MCNC: 6.7 G/DL (ref 6.8–8.8)
SODIUM SERPL-SCNC: 139 MMOL/L (ref 133–144)

## 2022-12-28 PROCEDURE — 80053 COMPREHEN METABOLIC PANEL: CPT | Performed by: FAMILY MEDICINE

## 2022-12-28 PROCEDURE — 99214 OFFICE O/P EST MOD 30 MIN: CPT | Performed by: FAMILY MEDICINE

## 2022-12-28 PROCEDURE — 36415 COLL VENOUS BLD VENIPUNCTURE: CPT | Performed by: FAMILY MEDICINE

## 2022-12-28 PROCEDURE — 85025 COMPLETE CBC W/AUTO DIFF WBC: CPT | Performed by: FAMILY MEDICINE

## 2022-12-28 NOTE — PROGRESS NOTES
SUBJECTIVE:  Chief Complaint   Patient presents with     Urgent Care     X7 Days stomach ache, painful, cramping, diarrhea, watery, going to the bathroom more that 10 times a day   No therapies tried      Marlen Solano is a 47 year old female who presents with a chief complaint of abdominal pain, diarrhea, cramping for 1 week.     Has been having diarrhea for the past 1 week, upwards of 10 times.  Watery stools, no blood in stools.  No known triggers, no one else with similar symptoms.  No fever.  No travel outside of country,  No recent antibiotic use.    Developed generalized abdominal pain for 1 day.  Denies any dysuria symptoms.    Works at [employer] as [employer]    Past Medical History:   Diagnosis Date     Lump of right breast      Current Outpatient Medications   Medication Sig Dispense Refill     amLODIPine (NORVASC) 5 MG tablet Take 1 tablet (5 mg) by mouth daily 90 tablet 3     tamoxifen (NOLVADEX) 20 MG tablet Take 1 tablet (20 mg) by mouth daily 90 tablet 3     Social History     Tobacco Use     Smoking status: Never     Smokeless tobacco: Never   Substance Use Topics     Alcohol use: No     Alcohol/week: 0.0 standard drinks       ROS:  Review of systems negative except as stated above.    EXAM:   BP (!) 143/86   Pulse 73   Temp 98.7  F (37.1  C) (Oral)   Wt 92.1 kg (203 lb)   SpO2 98%   BMI 33.27 kg/m    GENERAL APPEARANCE: healthy, alert and no distress  PSYCH:alert, affect bright    Results for orders placed or performed in visit on 12/28/22   Comprehensive metabolic panel (BMP + Alb, Alk Phos, ALT, AST, Total. Bili, TP)     Status: Abnormal   Result Value Ref Range    Sodium 139 133 - 144 mmol/L    Potassium 3.7 3.4 - 5.3 mmol/L    Chloride 105 94 - 109 mmol/L    Carbon Dioxide (CO2) 31 20 - 32 mmol/L    Anion Gap 3 3 - 14 mmol/L    Urea Nitrogen 13 7 - 30 mg/dL    Creatinine 1.00 0.52 - 1.04 mg/dL    Calcium 9.4 8.5 - 10.1 mg/dL    Glucose 116 (H) 70 - 99 mg/dL    Alkaline Phosphatase 61  40 - 150 U/L    AST 20 0 - 45 U/L    ALT 19 0 - 50 U/L    Protein Total 6.7 (L) 6.8 - 8.8 g/dL    Albumin 3.1 (L) 3.4 - 5.0 g/dL    Bilirubin Total 0.7 0.2 - 1.3 mg/dL    GFR Estimate 69 >60 mL/min/1.73m2   CBC with platelets and differential     Status: None (In process)    Narrative    The following orders were created for panel order CBC with platelets and differential.  Procedure                               Abnormality         Status                     ---------                               -----------         ------                     CBC with platelets and d...[[id number]]                      In process                   Please view results for these tests on the individual orders.         ASSESSMENT/PLAN:  (R10.84) Abdominal pain, generalized  (primary encounter diagnosis)  Plan: CBC with platelets and differential,         Comprehensive metabolic panel (BMP + Alb, Alk         Phos, ALT, AST, Total. Bili, TP)            (R19.7) Diarrhea, unspecified type  Plan: Enteric Bacteria and Virus Panel by SRIKANTH Stool,         C. difficile Toxin B PCR with reflex to C.         difficile Antigen and Toxins A/B EIA            Patient appears well, no acute distress and reviewed symptom presentation with diarrhea and now with abdominal discomfort still probably viral.  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  Labs obtained - CBC and CMP and will notify if any abnormalities.  Encourage plenty of fluids and BRAT diet to help with diarrhea.    Follow up with primary provider if no improvement of symptoms in 1 week    Redd Rojas MD  December 28, 2022 6:14 PM

## 2022-12-29 LAB
BASOPHILS # BLD AUTO: 0 10E3/UL (ref 0–0.2)
BASOPHILS NFR BLD AUTO: 1 %
EOSINOPHIL # BLD AUTO: 0.1 10E3/UL (ref 0–0.7)
EOSINOPHIL NFR BLD AUTO: 1 %
ERYTHROCYTE [DISTWIDTH] IN BLOOD BY AUTOMATED COUNT: 14.6 % (ref 10–15)
HCT VFR BLD AUTO: 41 % (ref 35–47)
HGB BLD-MCNC: 13.9 G/DL (ref 11.7–15.7)
IMM GRANULOCYTES # BLD: 0 10E3/UL
IMM GRANULOCYTES NFR BLD: 0 %
LYMPHOCYTES # BLD AUTO: 1.9 10E3/UL (ref 0.8–5.3)
LYMPHOCYTES NFR BLD AUTO: 32 %
MCH RBC QN AUTO: 27.4 PG (ref 26.5–33)
MCHC RBC AUTO-ENTMCNC: 33.9 G/DL (ref 31.5–36.5)
MCV RBC AUTO: 81 FL (ref 78–100)
MONOCYTES # BLD AUTO: 0.5 10E3/UL (ref 0–1.3)
MONOCYTES NFR BLD AUTO: 8 %
NEUTROPHILS # BLD AUTO: 3.4 10E3/UL (ref 1.6–8.3)
NEUTROPHILS NFR BLD AUTO: 58 %
NRBC # BLD AUTO: 0 10E3/UL
NRBC BLD AUTO-RTO: 0 /100
PLATELET # BLD AUTO: 269 10E3/UL (ref 150–450)
RBC # BLD AUTO: 5.07 10E6/UL (ref 3.8–5.2)
WBC # BLD AUTO: 5.8 10E3/UL (ref 4–11)

## 2023-03-30 ENCOUNTER — OFFICE VISIT (OUTPATIENT)
Dept: OPTOMETRY | Facility: CLINIC | Age: 48
End: 2023-03-30
Payer: COMMERCIAL

## 2023-03-30 DIAGNOSIS — H52.4 PRESBYOPIA: Primary | ICD-10-CM

## 2023-03-30 DIAGNOSIS — H52.11 MYOPIA OF RIGHT EYE: ICD-10-CM

## 2023-03-30 DIAGNOSIS — H52.222 REGULAR ASTIGMATISM OF LEFT EYE: ICD-10-CM

## 2023-03-30 PROCEDURE — 92015 DETERMINE REFRACTIVE STATE: CPT | Performed by: OPTOMETRIST

## 2023-03-30 PROCEDURE — 92004 COMPRE OPH EXAM NEW PT 1/>: CPT | Performed by: OPTOMETRIST

## 2023-03-30 ASSESSMENT — VISUAL ACUITY
OD_SC: 20/30
OS_SC+: -2
OD_SC: 20/20
OS_SC: 20/20
METHOD: SNELLEN - LINEAR
OS_SC: 20/30

## 2023-03-30 ASSESSMENT — REFRACTION_MANIFEST
OS_AXIS: 100
OS_CYLINDER: +0.75
METHOD_AUTOREFRACTION: 1
OD_ADD: +2.00
OS_CYLINDER: +2.00
OS_SPHERE: -0.75
OS_SPHERE: -0.50
OD_AXIS: 063
OD_SPHERE: PLANO
OD_SPHERE: -0.25
OS_AXIS: 084
OS_ADD: +2.00
OD_CYLINDER: SPHERE
OD_CYLINDER: +0.25

## 2023-03-30 ASSESSMENT — CONF VISUAL FIELD
OD_SUPERIOR_TEMPORAL_RESTRICTION: 0
OD_SUPERIOR_NASAL_RESTRICTION: 0
OS_NORMAL: 1
METHOD: COUNTING FINGERS
OD_INFERIOR_TEMPORAL_RESTRICTION: 0
OS_SUPERIOR_NASAL_RESTRICTION: 0
OS_INFERIOR_NASAL_RESTRICTION: 0
OS_SUPERIOR_TEMPORAL_RESTRICTION: 0
OD_NORMAL: 1
OD_INFERIOR_NASAL_RESTRICTION: 0
OS_INFERIOR_TEMPORAL_RESTRICTION: 0

## 2023-03-30 ASSESSMENT — EXTERNAL EXAM - LEFT EYE: OS_EXAM: NORMAL

## 2023-03-30 ASSESSMENT — EXTERNAL EXAM - RIGHT EYE: OD_EXAM: NORMAL

## 2023-03-30 ASSESSMENT — TONOMETRY
OD_IOP_MMHG: 18
OS_IOP_MMHG: 18
IOP_METHOD: APPLANATION

## 2023-03-30 ASSESSMENT — SLIT LAMP EXAM - LIDS
COMMENTS: NORMAL
COMMENTS: NORMAL

## 2023-03-30 ASSESSMENT — CUP TO DISC RATIO
OS_RATIO: 0.3
OD_RATIO: 0.3

## 2023-03-30 NOTE — LETTER
3/30/2023         RE: Marlen Solano  [address] [place] [address]        Dear Colleague,    Thank you for [facility] your patient, [facility]. Please see a copy of my visit note below.    Chief Complaint   Patient presents with     Annual Eye Exam        Last Eye Exam: 1st eye exam   Dilated Previously: No, side effects of dilation explained today    What are you currently using to see?  does not use glasses or contacts       Distance Vision Acuity: Satisfied with vision    Near Vision Acuity: Not satisfied     Eye Comfort: good  Do you use eye drops? : No      Cassandra Salgado - Optometric Assistant           Medical, surgical and family histories reviewed and updated 3/30/2023.     Lab Results   Component Value Date    A1C 7.4 04/06/2022      patient is diabetic did not mention but no retinopathy   OBJECTIVE: See Ophthalmology exam    ASSESSMENT:    ICD-10-CM    1. Presbyopia  H52.4       2. Myopia of right eye  H52.11       3. Regular astigmatism of left eye  H52.222           PLAN:   Discussed spec options    Diana Jeong OD         Again, thank you for allowing me to participate in the care of your patient.        Sincerely,        Diana Jeong, OD

## 2023-03-30 NOTE — PROGRESS NOTES
Chief Complaint   Patient presents with     Annual Eye Exam        Last Eye Exam: 1st eye exam   Dilated Previously: No, side effects of dilation explained today    What are you currently using to see?  does not use glasses or contacts       Distance Vision Acuity: Satisfied with vision    Near Vision Acuity: Not satisfied     Eye Comfort: good  Do you use eye drops? : No      Cassandra Salgado - Optometric Assistant           Medical, surgical and family histories reviewed and updated 3/30/2023.     Lab Results   Component Value Date    A1C 7.4 04/06/2022      patient is diabetic did not mention but no retinopathy   OBJECTIVE: See Ophthalmology exam    ASSESSMENT:    ICD-10-CM    1. Presbyopia  H52.4       2. Myopia of right eye  H52.11       3. Regular astigmatism of left eye  H52.222           PLAN:   Discussed spec options    Diana Jeong OD

## 2023-04-10 ENCOUNTER — NURSE TRIAGE (OUTPATIENT)
Dept: NURSING | Facility: CLINIC | Age: 48
End: 2023-04-10
Payer: COMMERCIAL

## 2023-04-10 DIAGNOSIS — I10 BENIGN ESSENTIAL HYPERTENSION: ICD-10-CM

## 2023-04-10 RX ORDER — AMLODIPINE BESYLATE 5 MG/1
5 TABLET ORAL DAILY
Qty: 90 TABLET | Refills: 3 | Status: SHIPPED | OUTPATIENT
Start: 2023-04-10 | End: 2023-09-22

## 2023-04-20 ENCOUNTER — PATIENT OUTREACH (OUTPATIENT)
Dept: CARE COORDINATION | Facility: CLINIC | Age: 48
End: 2023-04-20
Payer: COMMERCIAL

## 2023-07-20 ENCOUNTER — VIRTUAL VISIT (OUTPATIENT)
Dept: ONCOLOGY | Facility: CLINIC | Age: 48
End: 2023-07-20
Attending: INTERNAL MEDICINE
Payer: COMMERCIAL

## 2023-07-20 VITALS — WEIGHT: 200 LBS | HEIGHT: 66 IN | BODY MASS INDEX: 32.14 KG/M2

## 2023-07-20 DIAGNOSIS — N60.92 ATYPICAL LOBULAR HYPERPLASIA (ALH) OF LEFT BREAST: ICD-10-CM

## 2023-07-20 DIAGNOSIS — N60.91 ATYPICAL LOBULAR HYPERPLASIA (ALH) OF RIGHT BREAST: Primary | ICD-10-CM

## 2023-07-20 PROCEDURE — 99214 OFFICE O/P EST MOD 30 MIN: CPT | Performed by: INTERNAL MEDICINE

## 2023-07-20 RX ORDER — TAMOXIFEN CITRATE 20 MG/1
20 TABLET ORAL DAILY
Qty: 90 TABLET | Refills: 3 | Status: ON HOLD | OUTPATIENT
Start: 2023-07-20 | End: 2024-10-02

## 2023-07-20 ASSESSMENT — PAIN SCALES - GENERAL: PAINLEVEL: NO PAIN (0)

## 2023-07-20 NOTE — NURSING NOTE
Is the patient currently in the state of [place]? YES    Visit mode:TELEPHONE    If the visit is dropped, the patient can be reconnected by: TELEPHONE VISIT: Phone number: [phone]    Will anyone else be joining the visit? NO      How would you like to obtain your AVS? MyChart    Are changes needed to the allergy or medication list? NO    Reason for visit: Consult  Requests refill for tamoxifen

## 2023-07-20 NOTE — LETTER
7/20/2023         RE: Marlen Solano  [address]   [place] [address]        Dear Colleague,    Thank you for referring your patient, Marlen Solano, to the [facility]. Please see a copy of my visit note below.    Virtual Visit Details    Type of service:  Telephone Visit   Phone call duration: *** minutes     Visit Date: 07/20/2023    The patient is a 48-year-old patient who has been evaluated today by a telehealth visit.  She has given consent.    PATIENT LOCATION:  Home.    PHYSICIAN LOCATION:  [facility].    CHIEF COMPLAINT:    1.  Atypical lobular hyperplasia with an increased risk of breast cancer.  2.  Prescribed tamoxifen for prevention.    HISTORY OF PRESENT ILLNESS:  The patient is a 48-year-old premenopausal patient who has atypical lobular hyperplasia of the breast and is currently due to be on tamoxifen for prevention.  Over the last couple of months, she has run out of tamoxifen and so I have refilled it for her today.  She was diagnosed in 2018 and at that time, she had a biopsy done of the right breast and she also had an excisional biopsy of some skin of the left breast.  The right breast showed a complex fibroadenoma with focal atypical lobular hyperplasia.  The left breast showed some hypertrophied scar with chronic inflammation.  I have reviewed the pathology with her today.  I have also discussed with her that she is not up to date on a mammogram, so we will need to get one of those.  I have ordered that for her today.  She feels well otherwise.  She continues to work full time.  She denies today cough, shortness of breath, bone pain, any worrisome symptomatology.    REVIEW OF SYSTEMS:  On her review of systems today she is feeling well.  She has got no major symptomatology, but she is complaining of pain in the left breast, which has increased over the last couple of weeks.    MEDICATIONS AND ALLERGIES:  Outlined in the nursing  records.    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    PAST MEDICAL HISTORY:    1.  History of atypical lobular hyperplasia.  2.  History of hypertension.    FAMILY HISTORY:  Unremarkable for breast cancer.    PHYSICAL EXAMINATION:  Not been done today because this is a telehealth visit.    DATA REVIEW:  I have reviewed her previous mammograms as well as her previous pathology.  She is not up to date on a mammogram, so I have ordered that for her today.  I have also reordered her tamoxifen.      IMPRESSION AND PLAN:  This is a 48-year-old premenopausal patient with atypical lobular hyperplasia of the breast, leaving her at increased risk of breast cancer.  She will resume her preventative treatment with tamoxifen.  She has got pain in the left breast today, so I have ordered a diagnostic mammogram and ultrasound on her.  We have discussed her pathology today.  I will be in touch with her with the mammogram and ultrasound and if there is anything sinister, we will have her come into the clinic.  If not, I will see her back in clinic for a physical exam in 6 months.  All of the above has been discussed with her and she is in agreement.  The telephone conversation today has been 25 minutes.    Linda Franks MD        D: 2023   T: 2023   MT: ancelmo    Name:     SURAJ ANAND  MRN:      [id number]        Account:    [id number]   :      1975           Visit Date: 2023     Document: [id number]       Again, thank you for allowing me to participate in the care of your patient.        Sincerely,        Linda Franks MD

## 2023-07-20 NOTE — LETTER
7/20/2023         RE: Marlen Solano  [address]   [place] [address]        Dear Colleague,    Thank you for referring your patient, Marlen Solano, to the [facility]. Please see a copy of my visit note below.    Virtual Visit Details    Type of service:  Telephone Visit   Phone call duration: *** minutes     Visit Date: 07/20/2023    The patient is a 48-year-old patient who has been evaluated today by a telehealth visit.  She has given consent.    PATIENT LOCATION:  Home.    PHYSICIAN LOCATION:  [facility].    CHIEF COMPLAINT:    1.  Atypical lobular hyperplasia with an increased risk of breast cancer.  2.  Prescribed tamoxifen for prevention.    HISTORY OF PRESENT ILLNESS:  The patient is a 48-year-old premenopausal patient who has atypical lobular hyperplasia of the breast and is currently due to be on tamoxifen for prevention.  Over the last couple of months, she has run out of tamoxifen and so I have refilled it for her today.  She was diagnosed in 2018 and at that time, she had a biopsy done of the right breast and she also had an excisional biopsy of some skin of the left breast.  The right breast showed a complex fibroadenoma with focal atypical lobular hyperplasia.  The left breast showed some hypertrophied scar with chronic inflammation.  I have reviewed the pathology with her today.  I have also discussed with her that she is not up to date on a mammogram, so we will need to get one of those.  I have ordered that for her today.  She feels well otherwise.  She continues to work full time.  She denies today cough, shortness of breath, bone pain, any worrisome symptomatology.    REVIEW OF SYSTEMS:  On her review of systems today she is feeling well.  She has got no major symptomatology, but she is complaining of pain in the left breast, which has increased over the last couple of weeks.    MEDICATIONS AND ALLERGIES:  Outlined in the nursing  records.    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    PAST MEDICAL HISTORY:    1.  History of atypical lobular hyperplasia.  2.  History of hypertension.    FAMILY HISTORY:  Unremarkable for breast cancer.    PHYSICAL EXAMINATION:  Not been done today because this is a telehealth visit.    DATA REVIEW:  I have reviewed her previous mammograms as well as her previous pathology.  She is not up to date on a mammogram, so I have ordered that for her today.  I have also reordered her tamoxifen.      IMPRESSION AND PLAN:  This is a 48-year-old premenopausal patient with atypical lobular hyperplasia of the breast, leaving her at increased risk of breast cancer.  She will resume her preventative treatment with tamoxifen.  She has got pain in the left breast today, so I have ordered a diagnostic mammogram and ultrasound on her.  We have discussed her pathology today.  I will be in touch with her with the mammogram and ultrasound and if there is anything sinister, we will have her come into the clinic.  If not, I will see her back in clinic for a physical exam in 6 months.  All of the above has been discussed with her and she is in agreement.  The telephone conversation today has been 25 minutes.    Linda Franks MD        D: 2023   T: 2023   MT: ancelmo    Name:     SURAJ ANAND  MRN:      [id number]        Account:    [id number]   :      1975           Visit Date: 2023     Document: [id number]       Again, thank you for allowing me to participate in the care of your patient.        Sincerely,        Linda Franks MD

## 2023-07-20 NOTE — PROGRESS NOTES
Visit Date: 07/20/2023    The patient is a 48-year-old patient who has been evaluated today by a telehealth visit.  She has given consent.    PATIENT LOCATION:  Home.    PHYSICIAN LOCATION:  [facility].    CHIEF COMPLAINT:    1.  Atypical lobular hyperplasia with an increased risk of breast cancer.  2.  Prescribed tamoxifen for prevention.    HISTORY OF PRESENT ILLNESS:  The patient is a 48-year-old premenopausal patient who has atypical lobular hyperplasia of the breast and is currently due to be on tamoxifen for prevention.  Over the last couple of months, she has run out of tamoxifen and so I have refilled it for her today.  She was diagnosed in 2018 and at that time, she had a biopsy done of the right breast and she also had an excisional biopsy of some skin of the left breast.  The right breast showed a complex fibroadenoma with focal atypical lobular hyperplasia.  The left breast showed some hypertrophied scar with chronic inflammation.  I have reviewed the pathology with her today.  I have also discussed with her that she is not up to date on a mammogram, so we will need to get one of those.  I have ordered that for her today.  She feels well otherwise.  She continues to work full time.  She denies today cough, shortness of breath, bone pain, any worrisome symptomatology.    REVIEW OF SYSTEMS:  On her review of systems today she is feeling well.  She has got no major symptomatology, but she is complaining of pain in the left breast, which has increased over the last couple of weeks.    MEDICATIONS AND ALLERGIES:  Outlined in the nursing records.    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    PAST MEDICAL HISTORY:    1.  History of atypical lobular hyperplasia.  2.  History of hypertension.    FAMILY HISTORY:  Unremarkable for breast cancer.    PHYSICAL EXAMINATION:  Not been done today because this is a telehealth visit.    DATA REVIEW:  I have reviewed her  previous mammograms as well as her previous pathology.  She is not up to date on a mammogram, so I have ordered that for her today.  I have also reordered her tamoxifen.      IMPRESSION AND PLAN:  This is a 48-year-old premenopausal patient with atypical lobular hyperplasia of the breast, leaving her at increased risk of breast cancer.  She will resume her preventative treatment with tamoxifen.  She has got pain in the left breast today, so I have ordered a diagnostic mammogram and ultrasound on her.  We have discussed her pathology today.  I will be in touch with her with the mammogram and ultrasound and if there is anything sinister, we will have her come into the clinic.  If not, I will see her back in clinic for a physical exam in 6 months.  All of the above has been discussed with her and she is in agreement.  The telephone conversation today has been 25 minutes.    Linda Franks MD        D: 2023   T: 2023   MT: ancelmo    Name:     SURAJ ANAND  MRN:      [id number]        Account:    [id number]   :      1975           Visit Date: 2023     Document: [id number]

## 2023-07-25 ENCOUNTER — ANCILLARY ORDERS (OUTPATIENT)
Dept: ONCOLOGY | Facility: CLINIC | Age: 48
End: 2023-07-25

## 2023-07-25 ENCOUNTER — HOSPITAL ENCOUNTER (OUTPATIENT)
Dept: ULTRASOUND IMAGING | Facility: CLINIC | Age: 48
Discharge: HOME | End: 2023-07-25
Attending: INTERNAL MEDICINE
Payer: COMMERCIAL

## 2023-07-25 ENCOUNTER — HOSPITAL ENCOUNTER (OUTPATIENT)
Dept: MAMMOGRAPHY | Facility: CLINIC | Age: 48
Discharge: HOME | End: 2023-07-25
Attending: INTERNAL MEDICINE
Payer: COMMERCIAL

## 2023-07-25 DIAGNOSIS — N60.91 ATYPICAL LOBULAR HYPERPLASIA (ALH) OF RIGHT BREAST: ICD-10-CM

## 2023-07-25 DIAGNOSIS — N60.91 ATYPICAL LOBULAR HYPERPLASIA (ALH) OF RIGHT BREAST: Primary | ICD-10-CM

## 2023-07-25 DIAGNOSIS — N60.92 ATYPICAL LOBULAR HYPERPLASIA (ALH) OF LEFT BREAST: ICD-10-CM

## 2023-07-25 PROCEDURE — 76642 ULTRASOUND BREAST LIMITED: CPT | Mod: LT

## 2023-07-25 PROCEDURE — 77066 DX MAMMO INCL CAD BI: CPT

## 2023-08-10 ENCOUNTER — TELEPHONE (OUTPATIENT)
Dept: INTERNAL MEDICINE | Facility: CLINIC | Age: 48
End: 2023-08-10
Payer: COMMERCIAL

## 2023-08-10 NOTE — TELEPHONE ENCOUNTER
Reason for Call:  Appointment Request ASAP patient will be out of her Amlodipine in 5 days.     Patient requesting this type of appt:  Preventive/refill medications. Patient wants to make a physical appointment with Mary Ellen White at [facility].  Can you help fit patient in for a physical ASAP, she will be out of her Amlodipine in 5 days and will need this refilled ASAP. I was not able to schedule patient with her provider using decision tree. Could not go beyond Nov, no appointments before Nov.     Requested provider: Mary Ellen White    Reason patient unable to be scheduled: Not within requested timeframe    When does patient want to be seen/preferred time:  Any ASAP    Comments:N/A    Okay to leave a detailed message?: Yes at Home number on file [phone] (home)    Call taken on 8/10/2023 at 5:56 PM by Hanane James

## 2023-09-22 ENCOUNTER — OFFICE VISIT (OUTPATIENT)
Dept: INTERNAL MEDICINE | Facility: CLINIC | Age: 48
End: 2023-09-22
Payer: COMMERCIAL

## 2023-09-22 VITALS
SYSTOLIC BLOOD PRESSURE: 123 MMHG | DIASTOLIC BLOOD PRESSURE: 86 MMHG | HEIGHT: 66 IN | BODY MASS INDEX: 31.66 KG/M2 | OXYGEN SATURATION: 98 % | HEART RATE: 92 BPM | RESPIRATION RATE: 16 BRPM | TEMPERATURE: 98.9 F | WEIGHT: 197 LBS

## 2023-09-22 DIAGNOSIS — R73.09 BLOOD GLUCOSE ABNORMAL: ICD-10-CM

## 2023-09-22 DIAGNOSIS — Z00.00 ANNUAL PHYSICAL EXAM: Primary | ICD-10-CM

## 2023-09-22 DIAGNOSIS — Z23 NEED FOR TDAP VACCINATION: ICD-10-CM

## 2023-09-22 DIAGNOSIS — I10 BENIGN ESSENTIAL HYPERTENSION: ICD-10-CM

## 2023-09-22 DIAGNOSIS — Z13.220 SCREENING FOR HYPERLIPIDEMIA: ICD-10-CM

## 2023-09-22 DIAGNOSIS — Z13.29 SCREENING FOR THYROID DISORDER: ICD-10-CM

## 2023-09-22 DIAGNOSIS — Z12.11 SCREEN FOR COLON CANCER: ICD-10-CM

## 2023-09-22 DIAGNOSIS — R73.09 BLOOD GLUCOSE ABNORMAL: Primary | ICD-10-CM

## 2023-09-22 DIAGNOSIS — Z23 NEED FOR INFLUENZA VACCINATION: ICD-10-CM

## 2023-09-22 LAB
ALBUMIN SERPL BCG-MCNC: 4.1 G/DL (ref 3.5–5.2)
ALP SERPL-CCNC: 80 U/L (ref 35–104)
ALT SERPL W P-5'-P-CCNC: 17 U/L (ref 0–50)
ANION GAP SERPL CALCULATED.3IONS-SCNC: 9 MMOL/L (ref 7–15)
AST SERPL W P-5'-P-CCNC: 22 U/L (ref 0–45)
BASOPHILS # BLD AUTO: 0 10E3/UL (ref 0–0.2)
BASOPHILS NFR BLD AUTO: 0 %
BILIRUB SERPL-MCNC: 0.6 MG/DL
BUN SERPL-MCNC: 13.5 MG/DL (ref 6–20)
CALCIUM SERPL-MCNC: 8.7 MG/DL (ref 8.6–10)
CHLORIDE SERPL-SCNC: 107 MMOL/L (ref 98–107)
CHOLEST SERPL-MCNC: 219 MG/DL
CREAT SERPL-MCNC: 1.03 MG/DL (ref 0.51–0.95)
DEPRECATED HCO3 PLAS-SCNC: 26 MMOL/L (ref 22–29)
EGFRCR SERPLBLD CKD-EPI 2021: 66 ML/MIN/1.73M2
EOSINOPHIL # BLD AUTO: 0.1 10E3/UL (ref 0–0.7)
EOSINOPHIL NFR BLD AUTO: 2 %
ERYTHROCYTE [DISTWIDTH] IN BLOOD BY AUTOMATED COUNT: 13.8 % (ref 10–15)
GLUCOSE SERPL-MCNC: 156 MG/DL (ref 70–99)
HBA1C MFR BLD: 7.6 % (ref 0–5.6)
HCT VFR BLD AUTO: 41.3 % (ref 35–47)
HDLC SERPL-MCNC: 51 MG/DL
HGB BLD-MCNC: 14.7 G/DL (ref 11.7–15.7)
IMM GRANULOCYTES # BLD: 0 10E3/UL
IMM GRANULOCYTES NFR BLD: 0 %
LDLC SERPL CALC-MCNC: 153 MG/DL
LYMPHOCYTES # BLD AUTO: 2.1 10E3/UL (ref 0.8–5.3)
LYMPHOCYTES NFR BLD AUTO: 45 %
MCH RBC QN AUTO: 27.5 PG (ref 26.5–33)
MCHC RBC AUTO-ENTMCNC: 35.6 G/DL (ref 31.5–36.5)
MCV RBC AUTO: 77 FL (ref 78–100)
MONOCYTES # BLD AUTO: 0.3 10E3/UL (ref 0–1.3)
MONOCYTES NFR BLD AUTO: 7 %
NEUTROPHILS # BLD AUTO: 2.2 10E3/UL (ref 1.6–8.3)
NEUTROPHILS NFR BLD AUTO: 47 %
NONHDLC SERPL-MCNC: 168 MG/DL
PLATELET # BLD AUTO: 289 10E3/UL (ref 150–450)
POTASSIUM SERPL-SCNC: 4.2 MMOL/L (ref 3.4–5.3)
PROT SERPL-MCNC: 6.9 G/DL (ref 6.4–8.3)
RBC # BLD AUTO: 5.35 10E6/UL (ref 3.8–5.2)
SODIUM SERPL-SCNC: 142 MMOL/L (ref 136–145)
TRIGL SERPL-MCNC: 77 MG/DL
TSH SERPL DL<=0.005 MIU/L-ACNC: 1.37 UIU/ML (ref 0.3–4.2)
WBC # BLD AUTO: 4.8 10E3/UL (ref 4–11)

## 2023-09-22 PROCEDURE — 84443 ASSAY THYROID STIM HORMONE: CPT | Performed by: INTERNAL MEDICINE

## 2023-09-22 PROCEDURE — 99213 OFFICE O/P EST LOW 20 MIN: CPT | Mod: 25 | Performed by: INTERNAL MEDICINE

## 2023-09-22 PROCEDURE — 90682 RIV4 VACC RECOMBINANT DNA IM: CPT | Performed by: INTERNAL MEDICINE

## 2023-09-22 PROCEDURE — 90472 IMMUNIZATION ADMIN EACH ADD: CPT | Performed by: INTERNAL MEDICINE

## 2023-09-22 PROCEDURE — 90715 TDAP VACCINE 7 YRS/> IM: CPT | Performed by: INTERNAL MEDICINE

## 2023-09-22 PROCEDURE — 83036 HEMOGLOBIN GLYCOSYLATED A1C: CPT | Performed by: INTERNAL MEDICINE

## 2023-09-22 PROCEDURE — 80061 LIPID PANEL: CPT | Performed by: INTERNAL MEDICINE

## 2023-09-22 PROCEDURE — 90471 IMMUNIZATION ADMIN: CPT | Performed by: INTERNAL MEDICINE

## 2023-09-22 PROCEDURE — 36415 COLL VENOUS BLD VENIPUNCTURE: CPT | Performed by: INTERNAL MEDICINE

## 2023-09-22 PROCEDURE — 80053 COMPREHEN METABOLIC PANEL: CPT | Performed by: INTERNAL MEDICINE

## 2023-09-22 PROCEDURE — 85025 COMPLETE CBC W/AUTO DIFF WBC: CPT | Performed by: INTERNAL MEDICINE

## 2023-09-22 PROCEDURE — 99396 PREV VISIT EST AGE 40-64: CPT | Mod: 25 | Performed by: INTERNAL MEDICINE

## 2023-09-22 RX ORDER — AMLODIPINE BESYLATE 5 MG/1
5 TABLET ORAL DAILY
Qty: 90 TABLET | Refills: 3 | Status: SHIPPED | OUTPATIENT
Start: 2023-09-22 | End: 2024-06-03

## 2023-09-22 ASSESSMENT — ENCOUNTER SYMPTOMS
MYALGIAS: 0
DYSURIA: 0
SORE THROAT: 0
DIARRHEA: 0
CHILLS: 0
WEAKNESS: 0
FEVER: 0
ARTHRALGIAS: 0
JOINT SWELLING: 0
COUGH: 0
HEADACHES: 0
SHORTNESS OF BREATH: 0
EYE PAIN: 0
HEARTBURN: 0
CONSTIPATION: 0
DIZZINESS: 0
ABDOMINAL PAIN: 0
NAUSEA: 0
FREQUENCY: 0
HEMATURIA: 0
HEMATOCHEZIA: 0
NERVOUS/ANXIOUS: 0
PARESTHESIAS: 0
PALPITATIONS: 0
BREAST MASS: 0

## 2023-09-22 NOTE — COMMUNITY RESOURCES LIST (ENGLISH)
09/22/2023   [email or web address]  N/A  For questions about this resource list or additional care needs, please contact your primary care clinic or care manager.  Phone: [phone]   Email: N/A   Address: [address] [place] [address]   Hours: N/A        Financial Stability       Rent and mortgage payment assistance  1  [address] [place] [address] Distance: 1.7 miles      In-Person, Phone/Virtual   [address] [place] [address]  Language: English, Kiswahili  Hours: Mon - Thu 9:00 AM - 4:00 PM  Fees: Free   Phone: (680) 175-6220 Email: info@[employer] Website: https://[employer]/     2  [address] [place] [address] - Rent payment assistance Distance: 5.55 miles      In-Person, Phone/Virtual   [address] [place] [address]  Language: English  Hours: Mon 8:00 AM - 4:00 PM , Tue 8:00 AM - 7:00 PM , Wed - Thu 8:00 AM - 4:00 PM  Fees: Free   Phone: (755) 762-7536 Email: info@[employer] Website: https://[employer]/resources/resource-centers/          Important Numbers & Websites       Emergency Services   911  City Services   311  Poison Control   (741) 140-7242  Suicide Prevention Lifeline   (115) 496-5358 (TALK)  Child Abuse Hotline   (852) 475-6398 (4-A-Child)  Sexual Assault Hotline   (869) 257-9674 (HOPE)  National Runaway Safeline   (737) 558-2722 (RUNAWAY)  All-Options Talkline   (525) 270-6198  Substance Abuse Referral   (757) 727-9233 (HELP)

## 2023-09-22 NOTE — NURSING NOTE
Prior to immunization administration, verified patients identity using patient s name and date of birth. Please see Immunization Activity for additional information.     Screening Questionnaire for Adult Immunization    Are you sick today?   No   Do you have allergies to medications, food, a vaccine component or latex?   No   Have you ever had a serious reaction after receiving a vaccination?   No   Do you have a long-term health problem with heart, lung, kidney, or metabolic disease (e.g., diabetes), asthma, a blood disorder, no spleen, complement component deficiency, a cochlear implant, or a spinal fluid leak?  Are you on long-term aspirin therapy?   No   Do you have cancer, leukemia, HIV/AIDS, or any other immune system problem?   No   Do you have a parent, brother, or sister with an immune system problem?   No   In the past 3 months, have you taken medications that affect  your immune system, such as prednisone, other steroids, or anticancer drugs; drugs for the treatment of rheumatoid arthritis, Crohn s disease, or psoriasis; or have you had radiation treatments?   No   Have you had a seizure, or a brain or other nervous system problem?   No   During the past year, have you received a transfusion of blood or blood    products, or been given immune (gamma) globulin or antiviral drug?   No   For women: Are you pregnant or is there a chance you could become       pregnant during the next month?   No   Have you received any vaccinations in the past 4 weeks?   No     Immunization questionnaire answers were all negative.      Patient instructed to remain in clinic for 15 minutes afterwards, and to report any adverse reactions.     Screening performed by Kyleigh Padilla LPN on 9/22/2023 at 8:25 AM.

## 2023-09-22 NOTE — COMMUNITY RESOURCES LIST (ENGLISH)
09/22/2023   [facility] Outpatient Clin[facility]  N/A  For additional resource needs, please contact your health insurance member services or your primary care team.  Phone: [phone]   Email: N/A   Address: [address] [place] [address]   Hours: N/A        Financial Stability       Rent and mortgage payment assistance  1  [address] [place] [address] Distance: 1.7 miles      In-Person, Phone/Virtual   [address] [place] [address]  Language: English, Algerian  Hours: Mon - Thu 9:00 AM - 4:00 PM  Fees: Free   Phone: (521) 579-6475 Email: info@[employer] Website: https://[employer]/     2  [address] [place] [address] - Rent payment assistance Distance: 5.55 miles      In-Person, Phone/Virtual   [address] [place] [address]  Language: English  Hours: Mon 8:00 AM - 4:00 PM , Tue 8:00 AM - 7:00 PM , Wed - Thu 8:00 AM - 4:00 PM  Fees: Free   Phone: (215) 954-1760 Email: info@[employer] Website: https://[employer]/resources/resource-centers/          Important Numbers & Websites       [address]org  Poison Control   (968) 288-1506 [place]  Suicide and Crisis Lifeline   [address] [place], [place] [address]line.org  Childhelp [place] Child Abuse Hotline   672.376.6164 Childhelphotline.org  National Sexual Assault Hotline   (624) 986-4823 (HOPE) Rainn.org  National Runaway Safeline   (272) 259-3064 (RUNAWAY) 1800runaway.org  Pregnancy & Postpartum Support [place]   Call/text 885-808-3198 Ppsupport[place].org  Substance Abuse National Helpline ([facility]   784-900-HELP (7453) Findtreatment.gov  Emergency Services   911

## 2023-09-22 NOTE — PROGRESS NOTES
SUBJECTIVE:   CC: Marlen is an 48 year old who presents for preventive health visit.       9/22/2023     7:48 AM   Additional Questions   Roomed by Kyleigh       Patient is a 48-year-old female who presents to the clinic for her annual physical.  She has no acute concerns or complaints today.  Patient reports stable appetite.  She is stooling and voiding without issue.  Patient does take amlodipine 5 mg daily for management of her blood pressure.  She does check her blood pressure on occasion at home and she states that her systolic pressure is typically in the 120s.  Patient had her mammogram completed in July 2023.  She is not due for a Pap smear at this time.  She would like to update her influenza and tetanus booster today.    Healthy Habits:     Getting at least 3 servings of Calcium per day:  Yes    Bi-annual eye exam:  Yes    Dental care twice a year:  Yes    Sleep apnea or symptoms of sleep apnea:  Excessive snoring    Diet:  Regular (no restrictions)    Frequency of exercise:  None    Taking medications regularly:  Yes    Medication side effects:  Not applicable    Additional concerns today:  No      Today's PHQ-2 Score:       9/22/2023     7:50 AM   PHQ-2 ( 1999 [employer])   Q1: Little interest or pleasure in doing things 0   Q2: Feeling down, depressed or hopeless 0   PHQ-2 Score 0   Q1: Little interest or pleasure in doing things Not at all   Q2: Feeling down, depressed or hopeless Not at all   PHQ-2 Score 0       Social History     Tobacco Use    Smoking status: Never    Smokeless tobacco: Never   Substance Use Topics    Alcohol use: No     Alcohol/week: 0.0 standard drinks of alcohol             9/22/2023     7:49 AM   Alcohol Use   Prescreen: >3 drinks/day or >7 drinks/week? No     Reviewed orders with patient.  Reviewed health maintenance and updated orders accordingly - Yes  Lab work is in process    Breast Cancer Screening:        3/29/2022     4:20 PM   Breast CA Risk Assessment (FHS-7)   Do you  "have a family history of breast, colon, or ovarian cancer? No / Unknown         Mammogram Screening: Recommended annual mammography  Pertinent mammograms are reviewed under the imaging tab.    History of abnormal Pap smear: NO - age 30-65 PAP every 5 years with negative HPV co-testing recommended      Latest Ref Rng & Units 3/29/2022     5:05 PM 6/22/2016     9:10 AM 6/22/2016    12:00 AM   PAP / HPV   PAP  Negative for Intraepithelial Lesion or Malignancy (NILM)      PAP (Historical)    NIL    HPV 16 DNA Negative Negative  Negative     HPV 18 DNA Negative Negative  Negative     Other HR HPV Negative Negative  Negative       Reviewed and updated as needed this visit by clinical staff   Tobacco  Allergies    Med Hx  Surg Hx  Fam Hx  Soc Hx        Reviewed and updated as needed this visit by Provider                     Review of Systems   Constitutional:  Negative for chills and fever.   HENT:  Negative for congestion, ear pain, hearing loss and sore throat.    Eyes:  Negative for pain and visual disturbance.   Respiratory:  Negative for cough and shortness of breath.    Cardiovascular:  Negative for chest pain, palpitations and peripheral edema.   Gastrointestinal:  Negative for abdominal pain, constipation, diarrhea, heartburn, hematochezia and nausea.   Breasts:  Negative for tenderness, breast mass and discharge.   Genitourinary:  Negative for dysuria, frequency, genital sores, hematuria, pelvic pain, urgency, vaginal bleeding and vaginal discharge.   Musculoskeletal:  Negative for arthralgias, joint swelling and myalgias.   Skin:  Negative for rash.   Neurological:  Negative for dizziness, weakness, headaches and paresthesias.   Psychiatric/Behavioral:  Negative for mood changes. The patient is not nervous/anxious.           OBJECTIVE:   Blood pressure 123/86, pulse 92, temperature 98.9  F (37.2  C), temperature source Oral, resp. rate 16, height 1.664 m (5' 5.5\"), weight 89.4 kg (197 lb), SpO2 98 %, not " currently breastfeeding.    Physical Exam  Vitals reviewed.   HENT:      Head: Normocephalic and atraumatic.      Right Ear: Tympanic membrane, ear canal and external ear normal.      Left Ear: Tympanic membrane, ear canal and external ear normal.      Mouth/Throat:      Mouth: Mucous membranes are moist.      Pharynx: Oropharynx is clear.   Eyes:      Extraocular Movements: Extraocular movements intact.      Conjunctiva/sclera: Conjunctivae normal.      Pupils: Pupils are equal, round, and reactive to light.   Cardiovascular:      Rate and Rhythm: Normal rate and regular rhythm.      Pulses: Normal pulses.      Heart sounds: Normal heart sounds.   Pulmonary:      Effort: Pulmonary effort is normal.      Breath sounds: Normal breath sounds.   Abdominal:      General: Bowel sounds are normal.      Palpations: Abdomen is soft.   Musculoskeletal:         General: Normal range of motion.      Cervical back: Normal range of motion and neck supple.   Skin:     General: Skin is warm.      Capillary Refill: Capillary refill takes less than 2 seconds.   Neurological:      General: No focal deficit present.      Mental Status: She is alert and oriented to person, place, and time.       Diagnostic testing: CMP, CBC, FLP, and TSH are pending.    ASSESSMENT/PLAN:   (Z00.00) Annual physical exam  (primary encounter diagnosis)  Comment: At this time, patient does have an unremarkable physical examination.  Her blood pressure is noted to be at an acceptable level.  We did spend some time discussing appropriate dietary and lifestyle modifications to help keep her weight and blood pressure under good control.  Fasting labs are pending.  All health maintenance items were addressed.    (I10) Benign essential hypertension  Comment: At this time, patient's blood pressure does appear to be under good control.  Assuming no unexpected abnormalities on her outstanding metabolic panel, patient will continue her amlodipine at 5 mg daily for  "ongoing management of her blood pressure.  Side effects of calcium channel blockers were reviewed.  Patient was encouraged to continue monitoring her blood pressure outside the clinic setting.    (Z12.11) Screen for colon cancer  Comment: Colonoscopy Screening  Referral    (Z13.220) Screening for hyperlipidemia  Comment: Lipid panel reflex to direct LDL Fasting    (Z13.29) Screening for thyroid disorder  Comment: TSH with free T4 reflex    (Z23) Need for Tdap vaccination  Comment: Tdap booster administered.    (Z23) Need for influenza vaccination  Comment: Influenza vaccination administered.    Patient has been advised of split billing requirements and indicates understanding: Yes      COUNSELING:        BMI:   Estimated body mass index is 32.28 kg/m  as calculated from the following:    Height as of 7/20/23: 1.676 m (5' 6\").    Weight as of 7/20/23: 90.7 kg (200 lb).   Weight management plan: Discussed healthy diet and exercise guidelines      She reports that she has never smoked. She has never used smokeless tobacco.      James Gray MD  [facility]  "

## 2023-09-28 RX ORDER — ATORVASTATIN CALCIUM 20 MG/1
20 TABLET, FILM COATED ORAL DAILY
Qty: 30 TABLET | Refills: 5 | Status: ON HOLD | OUTPATIENT
Start: 2023-09-28 | End: 2024-10-02

## 2023-09-28 NOTE — PROGRESS NOTES
Prescription for metformin 500 mg twice per day and atorvastatin 20 mg daily submitted to pharmacy.  I would recommend follow-up fasting labs in 6 months.

## 2023-10-24 ENCOUNTER — TELEPHONE (OUTPATIENT)
Dept: GASTROENTEROLOGY | Facility: CLINIC | Age: 48
End: 2023-10-24
Payer: COMMERCIAL

## 2023-10-24 NOTE — TELEPHONE ENCOUNTER
"Endoscopy Scheduling Screen    Have you had a positive Covid test in the last 14 days?  No    Are you active on MyChart?   No    What insurance is in the chart?  Other:  [facility]    Ordering/Referring Provider: James Gray MD     (If ordering provider performs procedure, schedule with ordering provider unless otherwise instructed. )    BMI: Estimated body mass index is 32.28 kg/m  as calculated from the following:    Height as of 9/22/23: 1.664 m (5' 5.5\").    Weight as of 9/22/23: 89.4 kg (197 lb).     Sedation Ordered  moderate sedation.   If patient BMI > 50 do not schedule in ASC.    If patient BMI > 45 do not schedule at [facility].    Are you taking methadone or Suboxone?  No    Are you taking any prescription medications for pain 3 or more times per week?   No    Do you have a history of malignant hyperthermia or adverse reaction to anesthesia?  No    (Females) Are you currently pregnant?   No     Have you been diagnosed or told you have pulmonary hypertension?   No    Do you have an LVAD?  No    Have you been told you have moderate to severe sleep apnea?  No    Have you been told you have COPD, asthma, or any other lung disease?  No    Do you have any heart conditions?  No     Have you ever had an organ transplant?   No    Have you ever had or are you awaiting a heart or lung transplant?   No    Have you had a stroke or transient ischemic attack (TIA aka \"mini stroke\" in the last 6 months?   No    Have you been diagnosed with or been told you have cirrhosis of the liver?   No    Are you currently on dialysis?   No    Do you need assistance transferring?   No    BMI: Estimated body mass index is 32.28 kg/m  as calculated from the following:    Height as of 9/22/23: 1.664 m (5' 5.5\").    Weight as of 9/22/23: 89.4 kg (197 lb).     Is patients BMI > 40 and scheduling location [facility]?  No    Do you take an injectable medication for weight loss or diabetes (excluding insulin)?  No    Do you take the medication " Naltrexone?  No    Do you take blood thinners?  No       Prep   Are you currently on dialysis or do you have chronic kidney disease?  No    Do you have a diagnosis of diabetes?  Yes (Golytely Prep)    Do you have a diagnosis of cystic fibrosis (CF)?  No    On a regular basis do you go 3 -5 days between bowel movements?  No    BMI > 40?  No    Preferred Pharmacy:      [place], [place] - [address] [facility]  [address] [facility] [address]  Phone: [phone] Fax: [phone]      Final Scheduling Details   Colonoscopy prep sent?  Standard MiraLAX    Procedure scheduled  Colonoscopy    Surgeon:  DAVID     Date of procedure:  1/17     Pre-OP / PAC:   No - Not required for this site.    Location  [facility] - Patient preference.    Sedation   Moderate Sedation - Per order.      Patient Reminders:   You will receive a call from a Nurse to review instructions and health history.  This assessment must be completed prior to your procedure.  Failure to complete the Nurse assessment may result in the procedure being cancelled.      On the day of your procedure, please designate an adult(s) who can drive you home stay with you for the next 24 hours. The medicines used in the exam will make you sleepy. You will not be able to drive.      You cannot take public transportation, ride share services, or non-medical taxi service without a responsible caregiver.  Medical transport services are allowed with the requirement that a responsible caregiver will receive you at your destination.  We require that drivers and caregivers are confirmed prior to your procedure.     - suspected dehydration and hypovolemia   - echo without signs of impaired heart function, holding Furosemide   - run maintenance IVF abd repeat Cr in AM  - check orthostatics  - keep telemetry

## 2024-01-16 ENCOUNTER — TELEPHONE (OUTPATIENT)
Dept: GASTROENTEROLOGY | Facility: CLINIC | Age: 49
End: 2024-01-16
Payer: COMMERCIAL

## 2024-01-16 ENCOUNTER — PATIENT OUTREACH (OUTPATIENT)
Dept: ONCOLOGY | Facility: CLINIC | Age: 49
End: 2024-01-16
Payer: COMMERCIAL

## 2024-01-16 NOTE — PROGRESS NOTES
Marlen no showed her appointment today with Dr. Franks. Per chart review, this is Marlen's first no show occurrence with the oncology clinic. Writer mailed letter to the home address listed in Marlen's chart requesting a return call to clinic for rescheduling assistance.    Yessenia Rain, RN, BSN, OCN, CBCN  Nurse Care Coordinator  [facility] [place]  P: [phone]     F: [phone]

## 2024-01-16 NOTE — LETTER
"    January 16, 2024       TO: Marlen Solano  [address] [place] [address]         Dear Ms. Gonzalesporsha,    We missed you at your last appointment at [facility] on 1/16/23 at 3:30pm with Dr. Franks. We have several options to help you reschedule your appointment:    Call [phone]  Visit our website at [email or web address] and click \"I Want To\" (In Upper Right) and select Request an Appointment  Request an appointment via [facility] at [email or web address]    We are committed to providing you with exceptional care and service. It's important that our patients can get appointments when they need them, so we ask that you make every effort to consistently attend scheduled appointments and give us notice when you need to cancel an appointment.    If financial concerns have kept you from your appointment, we want to help. Please call a financial counselor at [phone] to assist you.      Sincerely,      [facility]  "

## 2024-01-16 NOTE — TELEPHONE ENCOUNTER
Caller: Marlen    Reason for Reschedule/Cancellation (please be detailed, any staff messages or encounters to note?): Pt is sick      Prior to reschedule please review:  Ordering Provider: TEMITOPE STEPHENS   Sedation per order: Moderate  Does patient have any ASC Exclusions, please identify?: No      Notes on Cancelled Procedure:  Procedure: Lower Endoscopy [Colonoscopy]   Date: 01/17/2024  Location: [facility]; [address] [place] [address]  Surgeon: DAVID      Rescheduled: Yes  Procedure: Lower Endoscopy [Colonoscopy]   Date: 02/14/2024  Location: [facility]; [address] [place] [address]  Surgeon: GISEL  Sedation Level Scheduled  Moderate,  Reason for Sedation Level Per Order  Prep/Instructions updated and sent: Yes, Letter

## 2024-01-23 ENCOUNTER — OFFICE VISIT (OUTPATIENT)
Dept: URGENT CARE | Facility: URGENT CARE | Age: 49
End: 2024-01-23
Payer: COMMERCIAL

## 2024-01-23 VITALS
HEART RATE: 67 BPM | DIASTOLIC BLOOD PRESSURE: 88 MMHG | WEIGHT: 203 LBS | BODY MASS INDEX: 33.27 KG/M2 | TEMPERATURE: 98.4 F | OXYGEN SATURATION: 99 % | SYSTOLIC BLOOD PRESSURE: 144 MMHG

## 2024-01-23 DIAGNOSIS — R10.10 UPPER ABDOMINAL PAIN: Primary | ICD-10-CM

## 2024-01-23 PROCEDURE — 99214 OFFICE O/P EST MOD 30 MIN: CPT | Performed by: PHYSICIAN ASSISTANT

## 2024-01-23 NOTE — PATIENT INSTRUCTIONS
Do not eat or drink after midnight.   If your pain worsens before your appointment tomorrow, go to the ER.

## 2024-01-23 NOTE — PROGRESS NOTES
Assessment/Plan:    Pt has mild RUQ/epigastric tenderness, but negative Franks's sign. She is afebrile, in NAD. Differential diagnosis includes but is not limited to GERD, gastritis, cholelithiasis/cholecystitis, pancreatitis.  Recommend ultrasound and labs including CMP, CBC, lipase. Am unable to obtain these in urgent care, so contacted [place] [facility]. They are unable to see the pt today, but can see her tomorrow. Pt prefers this over going to a different [facility] site today. Advised she go to the ER tonight if symptoms worsen before appointment tomorrow. NPO after midnight.     See patient instructions below.    At the end of the encounter, I discussed results, diagnosis, medications. Discussed red flags for immediate return to clinic/ER, as well as indications for follow up if no improvement. Patient understood and agreed to plan. Patient was stable for discharge.      ICD-10-CM    1. Upper abdominal pain  R10.10 Referral to Acute and Diagnostic Services (Day of diagnostic / First order acute)            Return in about 1 day (around 1/24/2024) for Go to the [facility] in [place] tomorrow.    FLAVIO Bautista, LOYDA  [facility] [place]  -----------------------------------------------------------------------------------------------------------------------------------------------------    HPI:  Marlen Solano is a 48 year old female who presents for evaluation of intermittent upper abdominal pain onset 1 week ago. No known aggravating or alleviating factors. She also notes increased gas. No treatments tried. Patient reports no fever/chills, chest pain, shortness of breath, hematochezia, melena, urinary sx, nausea, vomiting, diarrhea,  or any other symptoms. She does get constipated occasionally, no significant constipation currently. She had a BM today.    Past Medical History:   Diagnosis Date    Lump of right breast        Vitals:    01/23/24 1353   BP: (!) 144/88   Pulse: 67   Temp: 98.4  F  (36.9  C)   TempSrc: Oral   SpO2: 99%   Weight: 92.1 kg (203 lb)       Physical Exam  Vitals and nursing note reviewed.   Cardiovascular:      Rate and Rhythm: Normal rate and regular rhythm.   Pulmonary:      Effort: Pulmonary effort is normal.   Abdominal:      General: Bowel sounds are normal.      Palpations: Abdomen is soft.      Tenderness: There is abdominal tenderness in the right upper quadrant and epigastric area. There is no right CVA tenderness, left CVA tenderness, guarding or rebound. Negative signs include Franks's sign.   Neurological:      Mental Status: She is alert.         Labs/Imaging:  No results found for this or any previous visit (from the past 24 hour(s)).  No results found for this or any previous visit (from the past 24 hour(s)).        Patient Instructions   Do not eat or drink after midnight.   If your pain worsens before your appointment tomorrow, go to the ER.

## 2024-01-24 ENCOUNTER — OFFICE VISIT (OUTPATIENT)
Dept: PEDIATRICS | Facility: CLINIC | Age: 49
End: 2024-01-24
Payer: COMMERCIAL

## 2024-01-24 ENCOUNTER — HOSPITAL ENCOUNTER (OUTPATIENT)
Dept: ULTRASOUND IMAGING | Facility: CLINIC | Age: 49
Discharge: HOME | End: 2024-01-24
Attending: PREVENTIVE MEDICINE | Admitting: PREVENTIVE MEDICINE
Payer: COMMERCIAL

## 2024-01-24 VITALS
WEIGHT: 203 LBS | TEMPERATURE: 97.7 F | HEIGHT: 66 IN | SYSTOLIC BLOOD PRESSURE: 131 MMHG | RESPIRATION RATE: 20 BRPM | BODY MASS INDEX: 32.62 KG/M2 | HEART RATE: 70 BPM | OXYGEN SATURATION: 98 % | DIASTOLIC BLOOD PRESSURE: 87 MMHG

## 2024-01-24 DIAGNOSIS — R10.10 UPPER ABDOMINAL PAIN: ICD-10-CM

## 2024-01-24 DIAGNOSIS — R10.13 DYSPEPSIA: ICD-10-CM

## 2024-01-24 DIAGNOSIS — E11.65 TYPE 2 DIABETES MELLITUS WITH HYPERGLYCEMIA, WITHOUT LONG-TERM CURRENT USE OF INSULIN (H): Primary | ICD-10-CM

## 2024-01-24 DIAGNOSIS — K76.0 NAFLD (NONALCOHOLIC FATTY LIVER DISEASE): ICD-10-CM

## 2024-01-24 LAB
ALBUMIN SERPL BCG-MCNC: 4 G/DL (ref 3.5–5.2)
ALBUMIN UR-MCNC: NEGATIVE MG/DL
ALP SERPL-CCNC: 99 U/L (ref 40–150)
ALT SERPL W P-5'-P-CCNC: 18 U/L (ref 0–50)
AMORPH CRY #/AREA URNS HPF: ABNORMAL /HPF
ANION GAP SERPL CALCULATED.3IONS-SCNC: 9 MMOL/L (ref 7–15)
APPEARANCE UR: ABNORMAL
AST SERPL W P-5'-P-CCNC: 18 U/L (ref 0–45)
BASOPHILS # BLD AUTO: 0 10E3/UL (ref 0–0.2)
BASOPHILS NFR BLD AUTO: 1 %
BILIRUB SERPL-MCNC: 0.3 MG/DL
BILIRUB UR QL STRIP: NEGATIVE
BUN SERPL-MCNC: 8.3 MG/DL (ref 6–20)
CALCIUM SERPL-MCNC: 8.7 MG/DL (ref 8.6–10)
CHLORIDE SERPL-SCNC: 105 MMOL/L (ref 98–107)
COLOR UR AUTO: YELLOW
CREAT SERPL-MCNC: 0.89 MG/DL (ref 0.51–0.95)
DEPRECATED HCO3 PLAS-SCNC: 26 MMOL/L (ref 22–29)
EGFRCR SERPLBLD CKD-EPI 2021: 79 ML/MIN/1.73M2
EOSINOPHIL # BLD AUTO: 0.2 10E3/UL (ref 0–0.7)
EOSINOPHIL NFR BLD AUTO: 3 %
ERYTHROCYTE [DISTWIDTH] IN BLOOD BY AUTOMATED COUNT: 13.9 % (ref 10–15)
GLUCOSE SERPL-MCNC: 135 MG/DL (ref 70–99)
GLUCOSE UR STRIP-MCNC: NEGATIVE MG/DL
HBA1C MFR BLD: 7.9 %
HCT VFR BLD AUTO: 40.2 % (ref 35–47)
HGB BLD-MCNC: 14.4 G/DL (ref 11.7–15.7)
HGB UR QL STRIP: NEGATIVE
IMM GRANULOCYTES # BLD: 0 10E3/UL
IMM GRANULOCYTES NFR BLD: 0 %
KETONES UR STRIP-MCNC: NEGATIVE MG/DL
LEUKOCYTE ESTERASE UR QL STRIP: ABNORMAL
LIPASE SERPL-CCNC: 26 U/L (ref 13–60)
LYMPHOCYTES # BLD AUTO: 1.5 10E3/UL (ref 0.8–5.3)
LYMPHOCYTES NFR BLD AUTO: 30 %
MCH RBC QN AUTO: 28.2 PG (ref 26.5–33)
MCHC RBC AUTO-ENTMCNC: 35.8 G/DL (ref 31.5–36.5)
MCV RBC AUTO: 79 FL (ref 78–100)
MONOCYTES # BLD AUTO: 0.3 10E3/UL (ref 0–1.3)
MONOCYTES NFR BLD AUTO: 7 %
MUCOUS THREADS #/AREA URNS LPF: PRESENT /LPF
NEUTROPHILS # BLD AUTO: 3 10E3/UL (ref 1.6–8.3)
NEUTROPHILS NFR BLD AUTO: 59 %
NITRATE UR QL: NEGATIVE
NRBC # BLD AUTO: 0 10E3/UL
NRBC BLD AUTO-RTO: 0 /100
PH UR STRIP: 6 [PH] (ref 5–7)
PLATELET # BLD AUTO: 354 10E3/UL (ref 150–450)
POTASSIUM SERPL-SCNC: 4.2 MMOL/L (ref 3.4–5.3)
PROT SERPL-MCNC: 7.4 G/DL (ref 6.4–8.3)
RBC # BLD AUTO: 5.11 10E6/UL (ref 3.8–5.2)
RBC URINE: 1 /HPF
SODIUM SERPL-SCNC: 140 MMOL/L (ref 135–145)
SP GR UR STRIP: 1.02 (ref 1–1.03)
SQUAMOUS EPITHELIAL: 6 /HPF
UROBILINOGEN UR STRIP-MCNC: NORMAL MG/DL
WBC # BLD AUTO: 5 10E3/UL (ref 4–11)
WBC URINE: 4 /HPF

## 2024-01-24 PROCEDURE — 81001 URINALYSIS AUTO W/SCOPE: CPT | Performed by: PREVENTIVE MEDICINE

## 2024-01-24 PROCEDURE — 80053 COMPREHEN METABOLIC PANEL: CPT | Performed by: PREVENTIVE MEDICINE

## 2024-01-24 PROCEDURE — 36415 COLL VENOUS BLD VENIPUNCTURE: CPT | Performed by: PREVENTIVE MEDICINE

## 2024-01-24 PROCEDURE — 83690 ASSAY OF LIPASE: CPT | Performed by: PREVENTIVE MEDICINE

## 2024-01-24 PROCEDURE — 83036 HEMOGLOBIN GLYCOSYLATED A1C: CPT | Performed by: PREVENTIVE MEDICINE

## 2024-01-24 PROCEDURE — 99417 PROLNG OP E/M EACH 15 MIN: CPT | Performed by: PREVENTIVE MEDICINE

## 2024-01-24 PROCEDURE — 85025 COMPLETE CBC W/AUTO DIFF WBC: CPT | Performed by: PREVENTIVE MEDICINE

## 2024-01-24 PROCEDURE — 76705 ECHO EXAM OF ABDOMEN: CPT

## 2024-01-24 PROCEDURE — 99215 OFFICE O/P EST HI 40 MIN: CPT | Performed by: PREVENTIVE MEDICINE

## 2024-01-24 RX ORDER — FAMOTIDINE 40 MG/1
40 TABLET, FILM COATED ORAL DAILY
Qty: 30 TABLET | Refills: 0 | Status: SHIPPED | OUTPATIENT
Start: 2024-01-24 | End: 2024-02-23

## 2024-01-24 ASSESSMENT — PAIN SCALES - GENERAL: PAINLEVEL: EXTREME PAIN (8)

## 2024-01-24 NOTE — PROGRESS NOTES
Assessment & Plan     NAFLD (nonalcoholic fatty liver disease)  Dyspepsia    Most consistent with dyspepsia  CBC, CMP, Lipase, UA, RUQ US all reassuring  Avoid alcohol and nsaids  Work on weight loss  Famotidine 40 mg daily  H pylori pending  Follow up with pcp in 2 weeks for recheck  Would consider egd, gastric emptying study if not improving.    83 minutes spent by me on the date of the encounter doing chart review, history and exam, documentation and further activities per the note      See Patient Instructions    No follow-ups on file.    Moraima Gee is a 48 year old, presenting for the following health issues:  Abdominal Pain    HPI     Abdominal/Flank Pain  Onset/Duration: 1 week ago  Description:   Character: Sharp  Location: epigastric region  Radiation: None  Intensity: severe  Progression of Symptoms:  same and intermittent  Accompanying Signs & Symptoms:  Fever/chills: no   Gas/Bloating: YES- gas  Nausea: no   Vomitting: no   Diarrhea: no   Constipation:YES  Dysuria: no            Hematuria: no            Frequency: no            Incontinence of urine: no   History:            Last bowel movement: yesterday  Trauma: no   Previous similar pain: no    Previous tests done: none           Previous Abdominal surgery: no   Precipitating factors:   Does the pain change with:     Food: no      Bowel Movement: no     Urination: no              Other factors: no   Therapies tried and outcome:  Pepto    When food last eaten: 11 pm 1/23/24    This is a 49 yo female who has had upper abdominal pain for 1 weeks.  Occasionally worse with eating.  No cp, sob.  No fever or chills.  Some nausea but no vomiting.  No constipation or diarrhea.  No pain with urination, blood in urine or stool.  No inciting event or trauma.  No back pain.  Minimal alcohol.  Occasional nsaids.  Patient is not taking the metformin that she is prescribed.    Review of Systems  Constitutional, HEENT, cardiovascular, pulmonary, GI, ,  "musculoskeletal, neuro, skin, endocrine and psych systems are negative, except as otherwise noted.      Objective    /87 (BP Location: Right arm, Patient Position: Sitting, Cuff Size: Adult Large)   Pulse 70   Temp 97.7  F (36.5  C) (Oral)   Resp 20   Ht 1.676 m (5' 6\")   Wt 92.1 kg (203 lb)   SpO2 98%   BMI 32.77 kg/m    Body mass index is 32.77 kg/m .  Physical Exam   GENERAL: alert and no distress  EYES: Eyes grossly normal to inspection, PERRL and conjunctivae and sclerae normal  HENT: ear canals and TM's normal, nose and mouth without ulcers or lesions  NECK: no adenopathy, no asymmetry, masses, or scars  RESP: lungs clear to auscultation - no rales, rhonchi or wheezes  CV: regular rate and rhythm, normal S1 S2, no S3 or S4, no murmur, click or rub, no peripheral edema  ABDOMEN: soft, mild ttp epigastrium, no guarding, no rebound, neg carnetts, neg murphys, no ttp mcburneys pt, no hepatosplenomegaly, no masses and bowel sounds normal  MS: no gross musculoskeletal defects noted, no edema  SKIN: no suspicious lesions or rashes  NEURO: Normal strength and tone, mentation intact and speech normal  PSYCH: mentation appears normal, affect normal/bright  LYMPH: no cervical, supraclavicular, axillary, or inguinal adenopathy    Results for orders placed or performed during the hospital encounter of 01/24/24   US Abdomen Limited     Status: None    Narrative    ULTRASOUND ABDOMEN LIMITED January 24, 2024 11:13 AM    CLINICAL HISTORY: Epigastric and right upper quadrant abdominal pain.  Upper abdominal pain.    TECHNIQUE: Limited abdominal ultrasound.    COMPARISON: None.    FINDINGS:    GALLBLADDER: The gallbladder is normal. No gallstones, wall  thickening, or pericholecystic fluid. Negative sonographic Franks's  sign.    BILE DUCTS: There is no biliary dilatation. The common duct measures 4  mm.    LIVER: Some increased echogenicity is noted suggestive of  mild-moderate hepatic steatosis.    RIGHT KIDNEY: " No hydronephrosis.    PANCREAS: Obscured by bowel gas.    No ascites.      Impression    IMPRESSION:  1.  No cholelithiasis or cholecystitis.  2.  Mild-moderate hepatic steatosis.    ERIKA VILLASEÑOR MD         SYSTEM ID:  [id number]   Results for orders placed or performed in visit on 01/24/24   Comprehensive metabolic panel     Status: Abnormal   Result Value Ref Range    Sodium 140 135 - 145 mmol/L    Potassium 4.2 3.4 - 5.3 mmol/L    Carbon Dioxide (CO2) 26 22 - 29 mmol/L    Anion Gap 9 7 - 15 mmol/L    Urea Nitrogen 8.3 6.0 - 20.0 mg/dL    Creatinine 0.89 0.51 - 0.95 mg/dL    GFR Estimate 79 >60 mL/min/1.73m2    Calcium 8.7 8.6 - 10.0 mg/dL    Chloride 105 98 - 107 mmol/L    Glucose 135 (H) 70 - 99 mg/dL    Alkaline Phosphatase 99 40 - 150 U/L    AST 18 0 - 45 U/L    ALT 18 0 - 50 U/L    Protein Total 7.4 6.4 - 8.3 g/dL    Albumin 4.0 3.5 - 5.2 g/dL    Bilirubin Total 0.3 <=1.2 mg/dL   Lipase     Status: Normal   Result Value Ref Range    Lipase 26 13 - 60 U/L   CBC with platelets and differential     Status: None   Result Value Ref Range    WBC Count 5.0 4.0 - 11.0 10e3/uL    RBC Count 5.11 3.80 - 5.20 10e6/uL    Hemoglobin 14.4 11.7 - 15.7 g/dL    Hematocrit 40.2 35.0 - 47.0 %    MCV 79 78 - 100 fL    MCH 28.2 26.5 - 33.0 pg    MCHC 35.8 31.5 - 36.5 g/dL    RDW 13.9 10.0 - 15.0 %    Platelet Count 354 150 - 450 10e3/uL    % Neutrophils 59 %    % Lymphocytes 30 %    % Monocytes 7 %    % Eosinophils 3 %    % Basophils 1 %    % Immature Granulocytes 0 %    NRBCs per 100 WBC 0 <1 /100    Absolute Neutrophils 3.0 1.6 - 8.3 10e3/uL    Absolute Lymphocytes 1.5 0.8 - 5.3 10e3/uL    Absolute Monocytes 0.3 0.0 - 1.3 10e3/uL    Absolute Eosinophils 0.2 0.0 - 0.7 10e3/uL    Absolute Basophils 0.0 0.0 - 0.2 10e3/uL    Absolute Immature Granulocytes 0.0 <=0.4 10e3/uL    Absolute NRBCs 0.0 10e3/uL   Hemoglobin A1c     Status: Abnormal   Result Value Ref Range    Hemoglobin A1C 7.9 (H) <5.7 %   UA with Microscopic reflex to  Culture     Status: Abnormal    Specimen: Urine, Midstream   Result Value Ref Range    Color Urine Yellow Colorless, Straw, Light Yellow, Yellow    Appearance Urine Cloudy (A) Clear    Glucose Urine Negative Negative mg/dL    Bilirubin Urine Negative Negative    Ketones Urine Negative Negative mg/dL    Specific Gravity Urine 1.022 1.003 - 1.035    Blood Urine Negative Negative    pH Urine 6.0 5.0 - 7.0    Protein Albumin Urine Negative Negative mg/dL    Urobilinogen Urine Normal Normal, 2.0 mg/dL    Nitrite Urine Negative Negative    Leukocyte Esterase Urine Trace (A) Negative    Mucus Urine Present (A) None Seen /LPF    Amorphous Crystals Urine Few (A) None Seen /HPF    RBC Urine 1 <=2 /HPF    WBC Urine 4 <=5 /HPF    Squamous Epithelials Urine 6 (H) <=1 /HPF    Narrative    Urine Culture not indicated   CBC with platelets and differential     Status: None    Narrative    The following orders were created for panel order CBC with platelets and differential.  Procedure                               Abnormality         Status                     ---------                               -----------         ------                     CBC with platelets and d...[[id number]]                      Final result                 Please view results for these tests on the individual orders.             Signed Electronically by: Chas Jeong MD

## 2024-02-16 NOTE — TELEPHONE ENCOUNTER
Caller: No call made   Reason for Reschedule/Cancellation (please be detailed, any staff messages or encounters to note?): Case removed by RN stating pt did not complete prep      Prior to reschedule please review:  Ordering Provider: TEMITOPE STEPHENS   Sedation Determined: Moderate  Does patient have any ASC Exclusions, please identify?: No      Notes on Cancelled Procedure:  Procedure: Lower Endoscopy [Colonoscopy]   Date: 02/14/2024  Location: [facility]; [address] [place] [address]  Surgeon: GISEL      Rescheduled: No - LVM for pt to call back removed case from depot

## 2024-03-25 ENCOUNTER — PATIENT OUTREACH (OUTPATIENT)
Dept: INTERNAL MEDICINE | Facility: CLINIC | Age: 49
End: 2024-03-25
Payer: COMMERCIAL

## 2024-03-25 NOTE — TELEPHONE ENCOUNTER
Patient Quality Outreach    Patient is due for the following:   Diabetes -  A1C, LDL (Fasting), Microalbumin, and Foot Exam  Colon Cancer Screening      Topic Date Due    Pneumococcal Vaccine (1 of 2 - PCV) Never done    Zoster (Shingles) Vaccine (1 of 2) Never done    Polio Vaccine (2 of 3 - Adult catch-up series) 10/19/2012    Hepatitis B Vaccine (3 of 3 - 19+ 3-dose series) 11/16/2012    COVID-19 Vaccine (3 - Pfizer risk series) 02/16/2021       Next Steps:   No follow up needed at this time.  Patient was seen for diabetes on 1/24/24.    Type of outreach:    Chart review performed, no outreach needed.      Questions for provider review:    None           Malathi Finnegan MA

## 2024-03-26 ENCOUNTER — ALLIED HEALTH/NURSE VISIT (OUTPATIENT)
Dept: INTERNAL MEDICINE | Facility: CLINIC | Age: 49
End: 2024-03-26
Payer: COMMERCIAL

## 2024-03-26 VITALS — SYSTOLIC BLOOD PRESSURE: 122 MMHG | DIASTOLIC BLOOD PRESSURE: 80 MMHG

## 2024-03-26 DIAGNOSIS — I10 BENIGN ESSENTIAL HYPERTENSION: Primary | ICD-10-CM

## 2024-03-26 PROCEDURE — 99207 PR NO CHARGE NURSE ONLY: CPT

## 2024-03-27 ENCOUNTER — OFFICE VISIT (OUTPATIENT)
Dept: INTERNAL MEDICINE | Facility: CLINIC | Age: 49
End: 2024-03-27
Payer: COMMERCIAL

## 2024-03-27 VITALS
SYSTOLIC BLOOD PRESSURE: 128 MMHG | HEIGHT: 66 IN | TEMPERATURE: 97.1 F | OXYGEN SATURATION: 96 % | BODY MASS INDEX: 32.22 KG/M2 | WEIGHT: 200.5 LBS | RESPIRATION RATE: 18 BRPM | DIASTOLIC BLOOD PRESSURE: 82 MMHG | HEART RATE: 96 BPM

## 2024-03-27 DIAGNOSIS — I10 BENIGN ESSENTIAL HYPERTENSION: ICD-10-CM

## 2024-03-27 DIAGNOSIS — G44.219 EPISODIC TENSION-TYPE HEADACHE, NOT INTRACTABLE: Primary | ICD-10-CM

## 2024-03-27 PROCEDURE — 99213 OFFICE O/P EST LOW 20 MIN: CPT

## 2024-03-27 ASSESSMENT — PATIENT HEALTH QUESTIONNAIRE - PHQ9
SUM OF ALL RESPONSES TO PHQ QUESTIONS 1-9: 2
10. IF YOU CHECKED OFF ANY PROBLEMS, HOW DIFFICULT HAVE THESE PROBLEMS MADE IT FOR YOU TO DO YOUR WORK, TAKE CARE OF THINGS AT HOME, OR GET ALONG WITH OTHER PEOPLE: NOT DIFFICULT AT ALL
SUM OF ALL RESPONSES TO PHQ QUESTIONS 1-9: 2

## 2024-03-27 ASSESSMENT — ENCOUNTER SYMPTOMS: HEADACHES: 1

## 2024-03-27 NOTE — PROGRESS NOTES
"  Assessment & Plan     (G44.219) Episodic tension-type headache, not intractable  (primary encounter diagnosis)  Comment: Patient presents to the clinic for a complaint of headaches and a single episode of tingling behind her left eye. Patient has not seen an eye doctor and states she only drinks 16 oz of water a day. Clinical picture suggest she is dehydrated versus vision abnormality.   Plan: Advised patient to follow-up with eye doctor for further evaluation and to focus on drinking plenty of fluids.     (I10) Benign essential hypertension  Comment: Patient presents to the clinic with a chief complaint of high blood pressure.  She states that when she is working she has had her blood pressure taken and it is extremely high.  Upon being checked in the office yesterday by a nurse it was within normal limits.  Today's blood pressure was also within normal limits.  Patient is currently taking 5 mg of amlodipine.  Advised the patient to continue taking that medication and to return to the clinic if her blood pressure is uncontrolled.  Plan: Continue with medication as prescribed.  Return to the clinic if blood pressure is elevated or she has any chest pain, vision changes or shortness of breath.      20 minutes spent by me on the date of the encounter doing chart review, patient visit, and documentation       BMI  Estimated body mass index is 32.36 kg/m  as calculated from the following:    Height as of this encounter: 1.676 m (5' 6\").    Weight as of this encounter: 90.9 kg (200 lb 8 oz).   Weight management plan: Patient was referred to their PCP to discuss a diet and exercise plan.      Patient Instructions   Schedule an appointment with eye doctor   Drink plenty of water throughout the day: 64 oz.     Moraima Gee is a 48 year old, presenting for the following health issues: Patient is here wanting to check her blood pressure and talk about headaches.   Patient works here in the hospital   Fluid intake: " "Drinks plenty throughout the day- two bottles of water a day.     Headaches: Tylenol sometimes helps.   No sensitivity to light or nausea either.   A couple weeks ago her eyes were tingling.     She has not seen an eye doctor.       Headache and Hypertension        3/27/2024    12:49 PM   Additional Questions   Roomed by Angeli   Accompanied by Self     History of Present Illness       Hypertension: She presents for follow up of hypertension.  She does check blood pressure  regularly outside of the clinic. Outside blood pressures have been over 140/90. She follows a low salt diet.     She eats 2-3 servings of fruits and vegetables daily.She consumes 1 sweetened beverage(s) daily.She exercises with enough effort to increase her heart rate 10 to 19 minutes per day.  She exercises with enough effort to increase her heart rate 3 or less days per week.   She is taking medications regularly.                     Objective    /82 (BP Location: Right arm, Patient Position: Sitting, Cuff Size: Adult Regular)   Pulse 96   Temp 97.1  F (36.2  C) (Tympanic)   Resp 18   Ht 1.676 m (5' 6\")   Wt 90.9 kg (200 lb 8 oz)   SpO2 96%   BMI 32.36 kg/m    Body mass index is 32.36 kg/m .  Physical Exam               Signed Electronically by: JONATHAN Lopez CNP    "

## 2024-04-20 NOTE — NURSING NOTE
"Chief Complaint   Patient presents with     Urinary Problem       Initial /80  Pulse 91  Temp 98.3  F (36.8  C) (Oral)  Ht 5' 6\" (1.676 m)  Wt 186 lb (84.4 kg)  SpO2 98%  BMI 30.02 kg/m2 Estimated body mass index is 30.02 kg/(m^2) as calculated from the following:    Height as of this encounter: 5' 6\" (1.676 m).    Weight as of this encounter: 186 lb (84.4 kg).  Medication Reconciliation: complete    " Called mom back. Lakhwinder had emesis last night. Today she feels better. Able to stay hydrated. A wound is draining but there is no gush of fluid or erythema. Mom feels comfortable monitoring at this time. She will call or proceed to OR in case of fever, sustained nausea/emesis, dehydration or significant drainage from the wound.     Houston Alfonso MD  Pediatric Surgery

## 2024-06-02 DIAGNOSIS — I10 BENIGN ESSENTIAL HYPERTENSION: ICD-10-CM

## 2024-06-02 NOTE — TELEPHONE ENCOUNTER
Medication Question or Refill        What medication are you calling about (include dose and sig)?: amLODIPine (NORVASC) 5 MG tablet     Preferred Pharmacy:       [facility] [place] [id number] [address] [address]  Phone: [phone] Fax: [phone]      Controlled Substance Agreement on file:   CSA -- Patient Level:    CSA: None found at the patient level.       Who prescribed the medication?: James Gray    Do you need a refill? Yes    When did you use the medication last? 9/22    Patient offered an appointment? Yes: 8/19    Do you have any questions or concerns?  No      Okay to leave a detailed message?: Yes at Home number on file [phone] (home)

## 2024-06-04 RX ORDER — AMLODIPINE BESYLATE 5 MG/1
5 TABLET ORAL DAILY
Qty: 90 TABLET | Refills: 0 | Status: SHIPPED | OUTPATIENT
Start: 2024-06-04 | End: 2024-08-19

## 2024-06-25 ENCOUNTER — PATIENT OUTREACH (OUTPATIENT)
Dept: CARE COORDINATION | Facility: CLINIC | Age: 49
End: 2024-06-25
Payer: COMMERCIAL

## 2024-07-23 ENCOUNTER — PATIENT OUTREACH (OUTPATIENT)
Dept: CARE COORDINATION | Facility: CLINIC | Age: 49
End: 2024-07-23
Payer: COMMERCIAL

## 2024-08-03 ENCOUNTER — HOSPITAL ENCOUNTER (EMERGENCY)
Facility: CLINIC | Age: 49
Discharge: HOME | End: 2024-08-04
Attending: EMERGENCY MEDICINE | Admitting: EMERGENCY MEDICINE
Payer: COMMERCIAL

## 2024-08-03 VITALS
OXYGEN SATURATION: 96 % | TEMPERATURE: 98 F | HEIGHT: 66 IN | BODY MASS INDEX: 32.53 KG/M2 | HEART RATE: 100 BPM | DIASTOLIC BLOOD PRESSURE: 90 MMHG | WEIGHT: 202.38 LBS | RESPIRATION RATE: 20 BRPM | SYSTOLIC BLOOD PRESSURE: 125 MMHG

## 2024-08-03 DIAGNOSIS — R11.0 NAUSEA: ICD-10-CM

## 2024-08-03 DIAGNOSIS — R42 ORTHOSTATIC DIZZINESS: ICD-10-CM

## 2024-08-03 LAB
ALBUMIN SERPL BCG-MCNC: 4.2 G/DL (ref 3.5–5.2)
ALP SERPL-CCNC: 105 U/L (ref 40–150)
ALT SERPL W P-5'-P-CCNC: 19 U/L (ref 0–50)
ANION GAP SERPL CALCULATED.3IONS-SCNC: 16 MMOL/L (ref 7–15)
AST SERPL W P-5'-P-CCNC: ABNORMAL U/L
BASOPHILS # BLD AUTO: 0 10E3/UL (ref 0–0.2)
BASOPHILS NFR BLD AUTO: 1 %
BILIRUB SERPL-MCNC: 0.3 MG/DL
BUN SERPL-MCNC: 17.1 MG/DL (ref 6–20)
CALCIUM SERPL-MCNC: 9.1 MG/DL (ref 8.8–10.4)
CHLORIDE SERPL-SCNC: 104 MMOL/L (ref 98–107)
CREAT SERPL-MCNC: 1.09 MG/DL (ref 0.51–0.95)
EGFRCR SERPLBLD CKD-EPI 2021: 61 ML/MIN/1.73M2
EOSINOPHIL # BLD AUTO: 0.1 10E3/UL (ref 0–0.7)
EOSINOPHIL NFR BLD AUTO: 2 %
ERYTHROCYTE [DISTWIDTH] IN BLOOD BY AUTOMATED COUNT: 14.4 % (ref 10–15)
FLUAV RNA SPEC QL NAA+PROBE: NEGATIVE
FLUBV RNA RESP QL NAA+PROBE: NEGATIVE
GLUCOSE SERPL-MCNC: 212 MG/DL (ref 70–99)
HCO3 SERPL-SCNC: 20 MMOL/L (ref 22–29)
HCT VFR BLD AUTO: 43 % (ref 35–47)
HGB BLD-MCNC: 15.1 G/DL (ref 11.7–15.7)
HOLD SPECIMEN: NORMAL
HOLD SPECIMEN: NORMAL
IMM GRANULOCYTES # BLD: 0 10E3/UL
IMM GRANULOCYTES NFR BLD: 0 %
LYMPHOCYTES # BLD AUTO: 1.4 10E3/UL (ref 0.8–5.3)
LYMPHOCYTES NFR BLD AUTO: 24 %
MCH RBC QN AUTO: 27.3 PG (ref 26.5–33)
MCHC RBC AUTO-ENTMCNC: 35.1 G/DL (ref 31.5–36.5)
MCV RBC AUTO: 78 FL (ref 78–100)
MONOCYTES # BLD AUTO: 0.4 10E3/UL (ref 0–1.3)
MONOCYTES NFR BLD AUTO: 6 %
NEUTROPHILS # BLD AUTO: 4.1 10E3/UL (ref 1.6–8.3)
NEUTROPHILS NFR BLD AUTO: 67 %
NRBC # BLD AUTO: 0 10E3/UL
NRBC BLD AUTO-RTO: 0 /100
PLATELET # BLD AUTO: 334 10E3/UL (ref 150–450)
POTASSIUM SERPL-SCNC: 4.1 MMOL/L (ref 3.4–5.3)
PROT SERPL-MCNC: 7.6 G/DL (ref 6.4–8.3)
RBC # BLD AUTO: 5.54 10E6/UL (ref 3.8–5.2)
RSV RNA SPEC NAA+PROBE: NEGATIVE
SARS-COV-2 RNA RESP QL NAA+PROBE: NEGATIVE
SODIUM SERPL-SCNC: 140 MMOL/L (ref 135–145)
TROPONIN T SERPL HS-MCNC: 12 NG/L
WBC # BLD AUTO: 6 10E3/UL (ref 4–11)

## 2024-08-03 PROCEDURE — 99285 EMERGENCY DEPT VISIT HI MDM: CPT | Mod: 25

## 2024-08-03 PROCEDURE — 93005 ELECTROCARDIOGRAM TRACING: CPT

## 2024-08-03 PROCEDURE — 85379 FIBRIN DEGRADATION QUANT: CPT | Performed by: EMERGENCY MEDICINE

## 2024-08-03 PROCEDURE — 84484 ASSAY OF TROPONIN QUANT: CPT | Performed by: EMERGENCY MEDICINE

## 2024-08-03 PROCEDURE — 87637 SARSCOV2&INF A&B&RSV AMP PRB: CPT | Performed by: EMERGENCY MEDICINE

## 2024-08-03 PROCEDURE — 36415 COLL VENOUS BLD VENIPUNCTURE: CPT | Performed by: EMERGENCY MEDICINE

## 2024-08-03 PROCEDURE — 84460 ALANINE AMINO (ALT) (SGPT): CPT | Performed by: EMERGENCY MEDICINE

## 2024-08-03 PROCEDURE — 85025 COMPLETE CBC W/AUTO DIFF WBC: CPT | Performed by: EMERGENCY MEDICINE

## 2024-08-03 ASSESSMENT — COLUMBIA-SUICIDE SEVERITY RATING SCALE - C-SSRS
6. HAVE YOU EVER DONE ANYTHING, STARTED TO DO ANYTHING, OR PREPARED TO DO ANYTHING TO END YOUR LIFE?: NO
2. HAVE YOU ACTUALLY HAD ANY THOUGHTS OF KILLING YOURSELF IN THE PAST MONTH?: NO
1. IN THE PAST MONTH, HAVE YOU WISHED YOU WERE DEAD OR WISHED YOU COULD GO TO SLEEP AND NOT WAKE UP?: NO

## 2024-08-03 ASSESSMENT — ACTIVITIES OF DAILY LIVING (ADL): ADLS_ACUITY_SCORE: 33

## 2024-08-04 ENCOUNTER — APPOINTMENT (OUTPATIENT)
Dept: GENERAL RADIOLOGY | Facility: CLINIC | Age: 49
End: 2024-08-04
Attending: EMERGENCY MEDICINE
Payer: COMMERCIAL

## 2024-08-04 LAB
D DIMER PPP FEU-MCNC: <0.27 UG/ML FEU (ref 0–0.5)
TROPONIN T SERPL HS-MCNC: 9 NG/L

## 2024-08-04 PROCEDURE — 71046 X-RAY EXAM CHEST 2 VIEWS: CPT

## 2024-08-04 PROCEDURE — 84484 ASSAY OF TROPONIN QUANT: CPT | Performed by: EMERGENCY MEDICINE

## 2024-08-04 PROCEDURE — 36415 COLL VENOUS BLD VENIPUNCTURE: CPT | Performed by: EMERGENCY MEDICINE

## 2024-08-04 RX ORDER — ONDANSETRON 4 MG/1
4 TABLET, ORALLY DISINTEGRATING ORAL EVERY 6 HOURS PRN
Qty: 10 TABLET | Refills: 0 | Status: SHIPPED | OUTPATIENT
Start: 2024-08-04 | End: 2024-08-07

## 2024-08-04 ASSESSMENT — ACTIVITIES OF DAILY LIVING (ADL)
ADLS_ACUITY_SCORE: 35
ADLS_ACUITY_SCORE: 35

## 2024-08-04 NOTE — DISCHARGE INSTRUCTIONS
Increase the amount of fluids you are drinking for the next 3 days.  Pause when you stand up for a moment to let dizziness pass before you start walking again.

## 2024-08-04 NOTE — ED PROVIDER NOTES
"  Emergency Department Note      History of Present Illness     Chief Complaint   Chest Pain and Shortness of Breath      HPI   Marlen Solano is a 49 year old female with a history of hypertension who presents to the ED with her family for evaluation of chest pain and shortness of breath. The patient states she felt sudden onset chest pain, shortness of breath, and dizziness while standing about 2 hours ago. Notes she is asymptomatic now. Denies concern for dehydration. Denies nausea, urinary urgency, fever, chills, cough, chest pain, tachycardia, edema, or recent medication changes. Denies history of DVT/PE.    Independent Historian   None    Review of External Notes   No    Past Medical History     Medical History and Problem List   Cholelithiasis  HTN  Atypical lobular hyperplasia of right breast    Medications   Amlodipine   Atorvastatin   Metformin   Tamoxifen     Surgical History    section  Excise lesion trunk  Breast lumpectomy     Physical Exam     Patient Vitals for the past 24 hrs:   BP Temp Temp src Pulse Resp SpO2 Height Weight   24 2135 (!) 125/90 98  F (36.7  C) Temporal 100 20 96 % 1.676 m (5' 6\") 91.8 kg (202 lb 6.1 oz)     Physical Exam  Eyes:               Sclera white; Pupils are equal and round  ENT:                External ears and nares normal  CV:                  Rate as above with regular rhythm, no BLE edema  Resp:               Breath sounds clear and equal bilaterally                          Non-labored, no retractions or accessory muscle use  MS:                  Moves all extremities  Skin:                Warm and dry  Neuro:             Speech is normal and fluent. No apparent deficit.    Diagnostics     Lab Results   Labs Ordered and Resulted from Time of ED Arrival to Time of ED Departure   COMPREHENSIVE METABOLIC PANEL - Abnormal       Result Value    Sodium 140      Potassium 4.1      Carbon Dioxide (CO2) 20 (*)     Anion Gap 16 (*)     Urea Nitrogen 17.1      " Creatinine 1.09 (*)     GFR Estimate 61      Calcium 9.1      Chloride 104      Glucose 212 (*)     Alkaline Phosphatase 105      AST        ALT 19      Protein Total 7.6      Albumin 4.2      Bilirubin Total 0.3     CBC WITH PLATELETS AND DIFFERENTIAL - Abnormal    WBC Count 6.0      RBC Count 5.54 (*)     Hemoglobin 15.1      Hematocrit 43.0      MCV 78      MCH 27.3      MCHC 35.1      RDW 14.4      Platelet Count 334      % Neutrophils 67      % Lymphocytes 24      % Monocytes 6      % Eosinophils 2      % Basophils 1      % Immature Granulocytes 0      NRBCs per 100 WBC 0      Absolute Neutrophils 4.1      Absolute Lymphocytes 1.4      Absolute Monocytes 0.4      Absolute Eosinophils 0.1      Absolute Basophils 0.0      Absolute Immature Granulocytes 0.0      Absolute NRBCs 0.0     INFLUENZA A/B, RSV, & SARS-COV2 PCR - Normal    Influenza A PCR Negative      Influenza B PCR Negative      RSV PCR Negative      SARS CoV2 PCR Negative     TROPONIN T, HIGH SENSITIVITY - Normal    Troponin T, High Sensitivity 12     D DIMER QUANTITATIVE - Normal    D-Dimer Quantitative <0.27     TROPONIN T, HIGH SENSITIVITY - Normal    Troponin T, High Sensitivity 9         Imaging   Chest XR,  PA & LAT   Final Result   IMPRESSION: Mildly decreased lung volume. Heart size and pulmonary vascularity are normal. No convincing airspace opacity, pleural effusion or pneumothorax. Surgical clip seen in the right breast.          EKG   ECG taken at 2148, ECG read at 2310  Normal sinus rhythm  Biatrial enlargement  Left axis deviation  Pulmonary disease pattern  Minimal voltage criteria for LVH, may be normal variant (Marlo product)   Pulmonary disease pattern new as compared to prior, dated 10/30/20.  Rate 89 bpm. ID interval 166 ms. QRS duration 100 ms. QT/QTc 382/464 ms. P-R-T axes 76 -63 57.    EKG w/o ischemia, dysrhythmia, or pericarditis.  Pulmonary disease pattern new vs prior.      Independent Interpretation   CXR - no effusion,  pneumothorax, or pneumonia     ED Course      Medications Administered   Medications - No data to display    Procedures   Procedures     Discussion of Management   None    ED Course   ED Course as of 08/04/24 0219   Sat Aug 03, 2024   2306 I obtained history and performed a physical exam as noted above.    Sun Aug 04, 2024   0158 I rechecked and updated the patient.        Additional Documentation  None    Medical Decision Making / Diagnosis     CMS Diagnoses: None    MIPS       None    MDM   EKG w/o ischemia, dysrhythmia, or pericarditis.  Pulmonary disease pattern new vs prior.    Differential includes NSTEMI, pneumonia, pneumothorax, pleural effusion, esophageal spasm, GERD, upper GI process.  PERC positive with heart rate, d-dimer normal.  Serial troponin stable and normal, this is not atypical presentation of ACS.  Labs with elevated BUN which is commonly seen with prerenal pathology such as dehydration.  Recommended increasing fluid intake for the next couple of days.       Follow up with PCP for ongoing symptoms.     Return immediately for worsening or any concern.          Disposition   The patient was discharged.     Diagnosis     ICD-10-CM    1. Orthostatic dizziness  R42       2. Nausea  R11.0            Discharge Medications   Discharge Medication List as of 8/4/2024  2:23 AM        START taking these medications    Details   ondansetron (ZOFRAN ODT) 4 MG ODT tab Take 1 tablet (4 mg) by mouth every 6 hours as needed for nausea or vomiting, Disp-10 tablet, R-0, E-Prescribe               Scribe Disclosure:  Anahi GUZMAN, am serving as a scribe at 11:34 PM on 8/3/2024 to document services personally performed by Swapna Shen MD based on my observations and the provider's statements to me.        Swapna Shen MD  08/04/24 [id number]

## 2024-08-05 ENCOUNTER — PATIENT OUTREACH (OUTPATIENT)
Dept: INTERNAL MEDICINE | Facility: CLINIC | Age: 49
End: 2024-08-05
Payer: COMMERCIAL

## 2024-08-05 LAB
ATRIAL RATE - MUSE: 89 BPM
DIASTOLIC BLOOD PRESSURE - MUSE: NORMAL MMHG
INTERPRETATION ECG - MUSE: NORMAL
P AXIS - MUSE: 76 DEGREES
PR INTERVAL - MUSE: 166 MS
QRS DURATION - MUSE: 100 MS
QT - MUSE: 382 MS
QTC - MUSE: 464 MS
R AXIS - MUSE: -63 DEGREES
SYSTOLIC BLOOD PRESSURE - MUSE: NORMAL MMHG
T AXIS - MUSE: 57 DEGREES
VENTRICULAR RATE- MUSE: 89 BPM

## 2024-08-06 NOTE — TELEPHONE ENCOUNTER
Transitions of Care Outreach  Chief Complaint   Patient presents with    Hospital F/U       Most Recent Admission Date: 8/3/2024   Most Recent Admission Diagnosis:      Most Recent Discharge Date: 8/4/2024   Most Recent Discharge Diagnosis: Orthostatic dizziness - R42  Nausea - R11.0     Transitions of Care Assessment    Discharge Assessment  How are you doing now that you are home?: doing good  How are your symptoms? (Red Flag symptoms escalate to triage hotline per guidelines): Improved  Do you know how to contact your clinic care team if you have future questions or changes to your health status? : Yes  Does the patient have their discharge instructions? : Yes  Does the patient have questions regarding their discharge instructions? : No  Were you started on any new medications or were there changes to any of your previous medications? : No  Does the patient have all of their medications?: Yes  Do you have questions regarding any of your medications? : No  Do you have all of your needed medical supplies or equipment (DME)?  (i.e. oxygen tank, CPAP, cane, etc.): Yes    Follow up Plan     Discharge Follow-Up  Discharge follow up appointment scheduled in alignment with recommended follow up timeframe or Transitions of Risk Category? (Low = within 30 days; Moderate= within 14 days; High= within 7 days): Yes  Discharge Follow Up Appointment Date: 08/19/24  Discharge Follow Up Appointment Scheduled with?: Primary Care Provider    Future Appointments   Date Time Provider Department Center   8/19/2024  4:00 PM Dylan Amato MD [facility]       Outpatient Plan as outlined on AVS reviewed with patient.    For any urgent concerns, please contact our 24 hour nurse triage line: [phone] ([phone])       Lakesha Aguayo RN

## 2024-08-19 ENCOUNTER — OFFICE VISIT (OUTPATIENT)
Dept: INTERNAL MEDICINE | Facility: CLINIC | Age: 49
End: 2024-08-19
Payer: COMMERCIAL

## 2024-08-19 VITALS
RESPIRATION RATE: 20 BRPM | HEIGHT: 66 IN | DIASTOLIC BLOOD PRESSURE: 97 MMHG | OXYGEN SATURATION: 96 % | BODY MASS INDEX: 31.79 KG/M2 | SYSTOLIC BLOOD PRESSURE: 141 MMHG | HEART RATE: 84 BPM | WEIGHT: 197.8 LBS | TEMPERATURE: 98.6 F

## 2024-08-19 DIAGNOSIS — E11.65 TYPE 2 DIABETES MELLITUS WITH HYPERGLYCEMIA, WITHOUT LONG-TERM CURRENT USE OF INSULIN (H): ICD-10-CM

## 2024-08-19 DIAGNOSIS — Z12.11 SCREEN FOR COLON CANCER: ICD-10-CM

## 2024-08-19 DIAGNOSIS — Z78.0 MENOPAUSE: ICD-10-CM

## 2024-08-19 DIAGNOSIS — I10 BENIGN ESSENTIAL HYPERTENSION: ICD-10-CM

## 2024-08-19 DIAGNOSIS — Z00.00 ENCOUNTER FOR PREVENTATIVE ADULT HEALTH CARE EXAMINATION: Primary | ICD-10-CM

## 2024-08-19 DIAGNOSIS — Z12.31 VISIT FOR SCREENING MAMMOGRAM: ICD-10-CM

## 2024-08-19 PROBLEM — E11.9 DIABETES MELLITUS, TYPE 2 (H): Status: ACTIVE | Noted: 2024-08-19

## 2024-08-19 LAB
ALBUMIN SERPL BCG-MCNC: 4.3 G/DL (ref 3.5–5.2)
ALP SERPL-CCNC: 88 U/L (ref 40–150)
ALT SERPL W P-5'-P-CCNC: 12 U/L (ref 0–50)
ANION GAP SERPL CALCULATED.3IONS-SCNC: 13 MMOL/L (ref 7–15)
AST SERPL W P-5'-P-CCNC: 28 U/L (ref 0–45)
BILIRUB SERPL-MCNC: 0.6 MG/DL
BUN SERPL-MCNC: 11.4 MG/DL (ref 6–20)
CALCIUM SERPL-MCNC: 9.5 MG/DL (ref 8.8–10.4)
CHLORIDE SERPL-SCNC: 105 MMOL/L (ref 98–107)
CHOLEST SERPL-MCNC: 236 MG/DL
CREAT SERPL-MCNC: 0.91 MG/DL (ref 0.51–0.95)
CREAT UR-MCNC: 216 MG/DL
EGFRCR SERPLBLD CKD-EPI 2021: 76 ML/MIN/1.73M2
ERYTHROCYTE [DISTWIDTH] IN BLOOD BY AUTOMATED COUNT: 14 % (ref 10–15)
FASTING STATUS PATIENT QL REPORTED: NO
FASTING STATUS PATIENT QL REPORTED: NO
GLUCOSE SERPL-MCNC: 121 MG/DL (ref 70–99)
HBA1C MFR BLD: 7.7 % (ref 0–5.6)
HCO3 SERPL-SCNC: 23 MMOL/L (ref 22–29)
HCT VFR BLD AUTO: 41.6 % (ref 35–47)
HDLC SERPL-MCNC: 51 MG/DL
HGB BLD-MCNC: 15.1 G/DL (ref 11.7–15.7)
LDLC SERPL CALC-MCNC: 167 MG/DL
MCH RBC QN AUTO: 27.8 PG (ref 26.5–33)
MCHC RBC AUTO-ENTMCNC: 36.3 G/DL (ref 31.5–36.5)
MCV RBC AUTO: 77 FL (ref 78–100)
MICROALBUMIN UR-MCNC: 15.3 MG/L
MICROALBUMIN/CREAT UR: 7.08 MG/G CR (ref 0–25)
NONHDLC SERPL-MCNC: 185 MG/DL
PLATELET # BLD AUTO: 306 10E3/UL (ref 150–450)
POTASSIUM SERPL-SCNC: 4 MMOL/L (ref 3.4–5.3)
PROT SERPL-MCNC: 7.6 G/DL (ref 6.4–8.3)
RBC # BLD AUTO: 5.43 10E6/UL (ref 3.8–5.2)
SODIUM SERPL-SCNC: 141 MMOL/L (ref 135–145)
TRIGL SERPL-MCNC: 90 MG/DL
TSH SERPL DL<=0.005 MIU/L-ACNC: 0.83 UIU/ML (ref 0.3–4.2)
WBC # BLD AUTO: 4.8 10E3/UL (ref 4–11)

## 2024-08-19 PROCEDURE — 82043 UR ALBUMIN QUANTITATIVE: CPT | Performed by: INTERNAL MEDICINE

## 2024-08-19 PROCEDURE — 80061 LIPID PANEL: CPT | Performed by: INTERNAL MEDICINE

## 2024-08-19 PROCEDURE — 82570 ASSAY OF URINE CREATININE: CPT | Performed by: INTERNAL MEDICINE

## 2024-08-19 PROCEDURE — 80053 COMPREHEN METABOLIC PANEL: CPT | Performed by: INTERNAL MEDICINE

## 2024-08-19 PROCEDURE — 84443 ASSAY THYROID STIM HORMONE: CPT | Performed by: INTERNAL MEDICINE

## 2024-08-19 PROCEDURE — 99214 OFFICE O/P EST MOD 30 MIN: CPT | Mod: 25 | Performed by: INTERNAL MEDICINE

## 2024-08-19 PROCEDURE — 83036 HEMOGLOBIN GLYCOSYLATED A1C: CPT | Performed by: INTERNAL MEDICINE

## 2024-08-19 PROCEDURE — 99396 PREV VISIT EST AGE 40-64: CPT | Performed by: INTERNAL MEDICINE

## 2024-08-19 PROCEDURE — 85027 COMPLETE CBC AUTOMATED: CPT | Performed by: INTERNAL MEDICINE

## 2024-08-19 PROCEDURE — 36415 COLL VENOUS BLD VENIPUNCTURE: CPT | Performed by: INTERNAL MEDICINE

## 2024-08-19 RX ORDER — LOSARTAN POTASSIUM 50 MG/1
50 TABLET ORAL DAILY
Qty: 90 TABLET | Refills: 3 | Status: SHIPPED | OUTPATIENT
Start: 2024-08-19

## 2024-08-19 ASSESSMENT — PAIN SCALES - GENERAL: PAINLEVEL: NO PAIN (0)

## 2024-08-19 NOTE — PROGRESS NOTES
Preventive Care Visit  [facility]  Dylan Amato MD, Internal Medicine  Aug 19, 2024      Assessment & Plan     Screen for colon cancer    - Colonoscopy Screening  Referral; Future    Type 2 diabetes mellitus with hyperglycemia, without long-term current use of insulin (H)  Assess diabetic control   Continue treatment   - Albumin Random Urine Quantitative with Creat Ratio  - HEMOGLOBIN A1C  - OFFICE/OUTPT VISIT,EST,LEVL III    Visit for screening mammogram    - MA Screening Bilateral w/ Pepe; Future    Encounter for preventative adult health care examination  advised regular aerobic activity, low cholesterol, low salt diet, wearing seat belt,  self examinations, sunscreen protection.Obtain screening cholesterol, immunizations reviewed.    - MA Screening Bilateral w/ Pepe; Future  - losartan (COZAAR) 50 MG tablet; Take 1 tablet (50 mg) by mouth daily  - DX Bone Density; Future  - Lipid panel reflex to direct LDL Fasting  - CBC with platelets  - Comprehensive metabolic panel (BMP + Alb, Alk Phos, ALT, AST, Total. Bili, TP)  - TSH with free T4 reflex    Benign essential hypertension  Not well controlled HTN, will change from Amlodipine to Losartan for renal protection , monitor   - losartan (COZAAR) 50 MG tablet; Take 1 tablet (50 mg) by mouth daily  - Lipid panel reflex to direct LDL Fasting  - CBC with platelets  - Comprehensive metabolic panel (BMP + Alb, Alk Phos, ALT, AST, Total. Bili, TP)  - TSH with free T4 reflex  - OFFICE/OUTPT VISIT,EST,LEVL III    Menopause    - DX Bone Density; Future            See Patient Instructions    Moraima Gee is a 49 year old, presenting for the following:  Physical (Patient her for annual check up. Patient is non-fasting.)        8/19/2024     3:43 PM   Additional Questions   Roomed by Harjeet Bashir MA   Accompanied by Self         8/19/2024     3:43 PM   Patient Reported Additional Medications   Patient reports taking the following  new medications No          Healthy Habits:     Taking medications regularly:  0  History of Present Illness       Reason for visit:  Check up    She eats 2-3 servings of fruits and vegetables daily.She consumes 1 sweetened beverage(s) daily.She exercises with enough effort to increase her heart rate 9 or less minutes per day.  She exercises with enough effort to increase her heart rate 4 days per week.   She is taking medications regularly.    Has H/O DM. On diet , exercise and Metformin. Blood sugars are controlled. No parestesias. No hypoglycemias.  Has h/o HTN. on medical treatment. BP has not been well controlled. No side effects from medications. No CP, HA, dizziness. good compliance with medications and low salt diet.  Has H/O hyperlipidemia. On medical treatment and diet. No side effects. No muscle weakness, myalgias or upset stomach.         Annual Wellness Visit     Patient has been advised of split billing requirements and indicates understanding: Yes        In general, how would you rate your overall physical health? good  Do you have a special diet?  Diabetic         No data to display              Do you see a dentist two times every year?  Yes  Have you been more tired than usual lately?  No  If you drink alcohol do you typically have >3 drinks per day or >7 drinks per week? No  Do you have a current opioid prescription? No  Do you use any other controlled substances or medications that are not prescribed by a provider? None  Social History     Tobacco Use    Smoking status: Never    Smokeless tobacco: Never   Vaping Use    Vaping status: Never Used   Substance Use Topics    Alcohol use: No     Alcohol/week: 0.0 standard drinks of alcohol    Drug use: No       Needs assistance for the following daily activities: no assistance needed  Which of the following safety concerns are present in your home?  none identified   Do you (or your family members) have any concerns about your safety while driving?   No  Do you have any of the following hearing concerns?: No hearing concerns  In the past 6 months, have you been bothered by leaking of urine? No        9/22/2023   Social Factors   Worry food won't last until get money to buy more No   Food not last or not have enough money for food? No   Do you have housing? (Housing is defined as stable permanent housing and does not include staying ouside in a car, in a tent, in an abandoned building, in an overnight shelter, or couch-surfing.) Yes   Are you worried about losing your housing? Yes   Lack of transportation? No   Unable to get utilities (heat,electricity)? No             No data to display                     Today's PHQ-2 Score:       3/27/2024    12:49 PM   PHQ-2 ( 1999 Pfizer)   Q1: Little interest or pleasure in doing things 3   Q2: Feeling down, depressed or hopeless 0   PHQ-2 Score 3   Q1: Little interest or pleasure in doing things Nearly every day   Q2: Feeling down, depressed or hopeless Not at all   PHQ-2 Score 3         7/25/2023   LAST FHS-7 RESULTS   1st degree relative breast or ovarian cancer No   Any relative bilateral breast cancer No   Any male have breast cancer No   Any ONE woman have BOTH breast AND ovarian cancer No   Any woman with breast cancer before 50yrs No   2 or more relatives with breast AND/OR ovarian cancer No   2 or more relatives with breast AND/OR bowel cancer No           Mammogram Screening - Mammogram every 1-2 years updated in Health Maintenance based on mutual decision making      History of abnormal Pap smear: No - age 30- 64 PAP with HPV every 5 years recommended        Latest Ref Rng & Units 3/29/2022     5:05 PM 6/22/2016     9:10 AM 6/22/2016    12:00 AM   PAP / HPV   PAP  Negative for Intraepithelial Lesion or Malignancy (NILM)      PAP (Historical)    NIL    HPV 16 DNA Negative Negative  Negative     HPV 18 DNA Negative Negative  Negative     Other HR HPV Negative Negative  Negative       ASCVD Risk   The  10-year ASCVD risk score (Tulio VITAL, et al., 2019) is: 15.8%    Values used to calculate the score:      Age: 49 years      Sex: Female      Is Non- : Yes      Diabetic: Yes      Tobacco smoker: No      Systolic Blood Pressure: 141 mmHg      Is BP treated: Yes      HDL Cholesterol: 51 mg/dL      Total Cholesterol: 236 mg/dL           Reviewed and updated as needed this visit by Provider                    Lab work is in process  Labs reviewed in EPIC    Current providers sharing in care for this patient include:  Patient Care Team:  Dylan Amato MD as PCP - General (Internal Medicine)  Linda Franks MD as Assigned Cancer Care Provider  Diana Jeong OD as Assigned Surgical Provider  James Gray MD as Assigned PCP  Yessenia Rain RN as Specialty Care Coordinator (Hematology & Oncology)    The following health maintenance items are reviewed in Epic and correct as of today:  Health Maintenance   Topic Date Due    DIABETIC FOOT EXAM  Never done    Pneumococcal Vaccine: Pediatrics (0 to 5 Years) and At-Risk Patients (6 to 64 Years) (1 of 2 - PCV) Never done    COLORECTAL CANCER SCREENING  Never done    ZOSTER IMMUNIZATION (1 of 2) Never done    IPV IMMUNIZATION (2 of 3 - Adult catch-up series) 10/19/2012    HEPATITIS B IMMUNIZATION (3 of 3 - 19+ 3-dose series) 11/16/2012    COVID-19 Vaccine (3 - Pfizer risk series) 02/16/2021    EYE EXAM  03/30/2024    MAMMO SCREENING  07/25/2024    INFLUENZA VACCINE (1) 09/01/2024    ANNUAL REVIEW OF HM ORDERS  09/22/2024    A1C  11/19/2024    YEARLY PREVENTIVE VISIT  08/19/2025    BMP  08/19/2025    LIPID  08/19/2025    MICROALBUMIN  08/19/2025    HPV TEST  03/29/2027    PAP  03/29/2027    ADVANCE CARE PLANNING  04/09/2027    DTAP/TDAP/TD IMMUNIZATION (7 - Td or Tdap) 09/22/2033    HEPATITIS C SCREENING  Completed    HIV SCREENING  Completed    PHQ-2 (once per calendar year)  Completed    HPV IMMUNIZATION  Aged Out     MENINGITIS IMMUNIZATION  Aged Out    RSV MONOCLONAL ANTIBODY  Aged Out       Appropriate preventive services were discussed with this patient, including applicable screening as appropriate for fall prevention, nutrition, physical activity, Tobacco-use cessation, weight loss and cognition.  Checklist reviewing preventive services available has been given to the patient.               No data to display                  9/22/2023   Nutrition   Three or more servings of calcium each day? Yes   Diet: regular (no restrictions)            9/22/2023   Exercise   Frequency of exercise: None            9/22/2023   Social Factors   Worry food won't last until get money to buy more No   Food not last or not have enough money for food? No   Do you have housing? (Housing is defined as stable permanent housing and does not include staying ouside in a car, in a tent, in an abandoned building, in an overnight shelter, or couch-surfing.) Yes   Are you worried about losing your housing? Yes   Lack of transportation? No   Unable to get utilities (heat,electricity)? No            9/22/2023   Dental   Dentist two times every year? Yes                 Today's PHQ-2 Score:       3/27/2024    12:49 PM   PHQ-2 ( 1999 Pfizer)   Q1: Little interest or pleasure in doing things 3   Q2: Feeling down, depressed or hopeless 0   PHQ-2 Score 3   Q1: Little interest or pleasure in doing things Nearly every day   Q2: Feeling down, depressed or hopeless Not at all   PHQ-2 Score 3         9/22/2023   Substance Use   Alcohol more than 3/day or more than 7/wk No        Social History     Tobacco Use    Smoking status: Never    Smokeless tobacco: Never   Vaping Use    Vaping status: Never Used   Substance Use Topics    Alcohol use: No     Alcohol/week: 0.0 standard drinks of alcohol    Drug use: No           7/25/2023   LAST FHS-7 RESULTS   1st degree relative breast or ovarian cancer No   Any relative bilateral breast cancer No   Any male have breast  cancer No   Any ONE woman have BOTH breast AND ovarian cancer No   Any woman with breast cancer before 50yrs No   2 or more relatives with breast AND/OR ovarian cancer No   2 or more relatives with breast AND/OR bowel cancer No                 History of abnormal Pap smear:         Latest Ref Rng & Units 3/29/2022     5:05 PM 6/22/2016     9:10 AM 6/22/2016    12:00 AM   PAP / HPV   PAP  Negative for Intraepithelial Lesion or Malignancy (NILM)      PAP (Historical)    NIL    HPV 16 DNA Negative Negative  Negative     HPV 18 DNA Negative Negative  Negative     Other HR HPV Negative Negative  Negative       ASCVD Risk   The 10-year ASCVD risk score (Tulio VITAL, et al., 2019) is: 14.5%    Values used to calculate the score:      Age: 49 years      Sex: Female      Is Non- : Yes      Diabetic: Yes      Tobacco smoker: No      Systolic Blood Pressure: 140 mmHg      Is BP treated: Yes      HDL Cholesterol: 51 mg/dL      Total Cholesterol: 219 mg/dL           Reviewed and updated as needed this visit by Provider                    Lab work is in process  Labs reviewed in EPIC      Review of Systems  CONSTITUTIONAL: NEGATIVE for fever, chills, change in weight  INTEGUMENTARY/SKIN: NEGATIVE for worrisome rashes, moles or lesions  EYES: NEGATIVE for vision changes or irritation  ENT/MOUTH: NEGATIVE for ear, mouth and throat problems  RESP: NEGATIVE for significant cough or SOB  BREAST: NEGATIVE for masses, tenderness or discharge  CV: NEGATIVE for chest pain, palpitations or peripheral edema  GI: NEGATIVE for nausea, abdominal pain, heartburn, or change in bowel habits  : NEGATIVE for frequency, dysuria, or hematuria  MUSCULOSKELETAL: NEGATIVE for significant arthralgias or myalgia  NEURO: NEGATIVE for weakness, dizziness or paresthesias  ENDOCRINE: NEGATIVE for temperature intolerance, skin/hair changes  HEME: NEGATIVE for bleeding problems  PSYCHIATRIC: NEGATIVE for changes in mood or  "affect     Objective    Exam  BP (!) 140/93 (BP Location: Left arm, Patient Position: Sitting, Cuff Size: Adult Regular)   Pulse 84   Temp 98.6  F (37  C) (Oral)   Resp 20   Ht 1.676 m (5' 6\")   Wt 89.7 kg (197 lb 12.8 oz)   SpO2 96%   BMI 31.93 kg/m     Estimated body mass index is 31.93 kg/m  as calculated from the following:    Height as of this encounter: 1.676 m (5' 6\").    Weight as of this encounter: 89.7 kg (197 lb 12.8 oz).    Physical Exam  GENERAL: alert and no distress  EYES: Eyes grossly normal to inspection, PERRL and conjunctivae and sclerae normal  HENT: ear canals and TM's normal, nose and mouth without ulcers or lesions  NECK: no adenopathy, no asymmetry, masses, or scars  RESP: lungs clear to auscultation - no rales, rhonchi or wheezes  CV: regular rate and rhythm, normal S1 S2, no S3 or S4, no murmur, click or rub, no peripheral edema  ABDOMEN: soft, nontender, no hepatosplenomegaly, no masses and bowel sounds normal  MS: no gross musculoskeletal defects noted, no edema  SKIN: no suspicious lesions or rashes  NEURO: Normal strength and tone, mentation intact and speech normal  PSYCH: mentation appears normal, affect normal/bright        Signed Electronically by: Dylan Amato MD    "

## 2024-08-19 NOTE — LETTER
August 21, 2024      Marlen K Russ  [address] [place] [address]        Dear Ms.Christianporsha,    We are writing to inform you of your test results.    Elevated cholesterol and not well controlled diabetes. Recommend to take Metformin and Lipitor regularly, along with diet and exercise. Follow up in 6 months.   The rest of your results are normal.     Resulted Orders   Albumin Random Urine Quantitative with Creat Ratio   Result Value Ref Range    Creatinine Urine mg/dL 216.0 mg/dL      Comment:      The reference ranges have not been established in urine creatinine. The results should be integrated into the clinical context for interpretation.    Albumin Urine mg/L 15.3 mg/L      Comment:      The reference ranges have not been established in urine albumin. The results should be integrated into the clinical context for interpretation.    Albumin Urine mg/g Cr 7.08 0.00 - 25.00 mg/g Cr      Comment:      Microalbuminuria is defined as an albumin:creatinine ratio of 17 to 299 for males and 25 to 299 for females. A ratio of albumin:creatinine of 300 or higher is indicative of overt proteinuria.  Due to biologic variability, positive results should be confirmed by a second, first-morning random or 24-hour timed urine specimen. If there is discrepancy, a third specimen is recommended. When 2 out of 3 results are in the microalbuminuria range, this is evidence for incipient nephropathy and warrants increased efforts at glucose control, blood pressure control, and institution of therapy with an angiotensin-converting-enzyme (ACE) inhibitor (if the patient can tolerate it).     HEMOGLOBIN A1C   Result Value Ref Range    Hemoglobin A1C 7.7 (H) 0.0 - 5.6 %      Comment:      Normal <5.7%   Prediabetes 5.7-6.4%    Diabetes 6.5% or higher     Note: Adopted from ADA consensus guidelines.   Lipid panel reflex to direct LDL Fasting   Result Value Ref Range    Cholesterol 236 (H) <200 mg/dL    Triglycerides 90  <150 mg/dL    Direct Measure HDL 51 >=50 mg/dL    LDL Cholesterol Calculated 167 (H) <=100 mg/dL    Non HDL Cholesterol 185 (H) <130 mg/dL    Patient Fasting > 8hrs? No     Narrative    Cholesterol  Desirable:  <200 mg/dL    Triglycerides  Normal:  Less than 150 mg/dL  Borderline High:  150-199 mg/dL  High:  200-499 mg/dL  Very High:  Greater than or equal to 500 mg/dL    Direct Measure HDL  Female:  Greater than or equal to 50 mg/dL   Male:  Greater than or equal to 40 mg/dL    LDL Cholesterol  Desirable:  <100mg/dL  Above Desirable:  100-129 mg/dL   Borderline High:  130-159 mg/dL   High:  160-189 mg/dL   Very High:  >= 190 mg/dL    Non HDL Cholesterol  Desirable:  130 mg/dL  Above Desirable:  130-159 mg/dL  Borderline High:  160-189 mg/dL  High:  190-219 mg/dL  Very High:  Greater than or equal to 220 mg/dL   CBC with platelets   Result Value Ref Range    WBC Count 4.8 4.0 - 11.0 10e3/uL    RBC Count 5.43 (H) 3.80 - 5.20 10e6/uL    Hemoglobin 15.1 11.7 - 15.7 g/dL    Hematocrit 41.6 35.0 - 47.0 %    MCV 77 (L) 78 - 100 fL    MCH 27.8 26.5 - 33.0 pg    MCHC 36.3 31.5 - 36.5 g/dL    RDW 14.0 10.0 - 15.0 %    Platelet Count 306 150 - 450 10e3/uL   Comprehensive metabolic panel (BMP + Alb, Alk Phos, ALT, AST, Total. Bili, TP)   Result Value Ref Range    Sodium 141 135 - 145 mmol/L    Potassium 4.0 3.4 - 5.3 mmol/L    Carbon Dioxide (CO2) 23 22 - 29 mmol/L    Anion Gap 13 7 - 15 mmol/L    Urea Nitrogen 11.4 6.0 - 20.0 mg/dL    Creatinine 0.91 0.51 - 0.95 mg/dL    GFR Estimate 76 >60 mL/min/1.73m2      Comment:      eGFR calculated using 2021 CKD-EPI equation.    Calcium 9.5 8.8 - 10.4 mg/dL      Comment:      Reference intervals for this test were updated on 7/16/2024 to reflect our healthy population more accurately. There may be differences in the flagging of prior results with similar values performed with this method. Those prior results can be interpreted in the context of the updated reference intervals.     Chloride 105 98 - 107 mmol/L    Glucose 121 (H) 70 - 99 mg/dL    Alkaline Phosphatase 88 40 - 150 U/L    AST 28 0 - 45 U/L    ALT 12 0 - 50 U/L    Protein Total 7.6 6.4 - 8.3 g/dL    Albumin 4.3 3.5 - 5.2 g/dL    Bilirubin Total 0.6 <=1.2 mg/dL    Patient Fasting > 8hrs? No    TSH with free T4 reflex   Result Value Ref Range    TSH 0.83 0.30 - 4.20 uIU/mL       If you have any questions or concerns, please call the clinic at the number listed above.       Sincerely,      Dylan Amato MD

## 2024-08-23 ENCOUNTER — TELEPHONE (OUTPATIENT)
Dept: GASTROENTEROLOGY | Facility: CLINIC | Age: 49
End: 2024-08-23
Payer: COMMERCIAL

## 2024-08-23 DIAGNOSIS — Z12.11 SPECIAL SCREENING FOR MALIGNANT NEOPLASMS, COLON: Primary | ICD-10-CM

## 2024-08-23 NOTE — LETTER
2024      [place] [facility]  [address]   [place] [address]      Standard Golytely (Colyte, Nulytely) Bowel Prep   Prep instructions for your colonoscopy   For prep questions, please call: [facility] - [address] [place] [address] -  [phone] option 2    Bowel prep was sent 2024 to    [place], [place] - [address] [address] [facility].         Please read these instructions carefully at least 7 days prior to your colonoscopy procedure. Be sure to follow all directions completely. The inside of your colon must be clean to allow for a complete examination for the presence of any growths, polyps, and/or abnormalities, as well as their biopsy or removal. A number of tips are included in order to make this part of the procedure as comfortable as possible.    Getting ready    prescription at the pharmacy. Endoscopy team will order this about 2 weeks before to your scheduled procedure. Included in the kit will be the followin Dulcolax (Bisacodyl) 5mg tablets  1 container of Polyethylene Glycol (Golytely, Colyte, Nulytely, Gavilyte-G)    A nurse will call you to go over your appointment details and prep instructions. Not completing the nurse call could result with your appointment being cancelled.    You must arrange for an adult to drive you home after your exam. Your colonoscopy cannot be done unless you have a ride. If you need to use public transportation, someone must ride with you and stay with you for up to 24 hours.       7 days before procedure   Consult with your prescribing provider about stopping any:     Diabetic/Weight Loss Injectable Medication GLP-1 agonist (such as Exenatide (Byetta, Bydureon),  Mounjaro (Tirzepatide).  Ozempic (Semaglutide). Semaglutide. Symlin (Pamlintide), Tanzeum (Albiglutide). Tirzepatide-Weight Management (Zepbound), Trulicity (Dulaglutide), Victoza (Saxenda, Liraglutide), Wegovy (Semaglutide)  please follow below guidelines for holding:    For weekly injection HOLD 7 days before procedure.  For once or twice a day injection HOLD the day before procedure and day of procedure.  For oral, daily dosing (Rybelsus) HOLD 7 days before procedure.    Blood thinning and/or anti platelet medications: such as Coumadin, Plavix, Xarelto, Eliquis, Lovenox or others, these medications may need to be stopped temporarily before your procedure.     If you take insulin for diabetes, ask your prescribing provider for instructions on how to manage this medication while preparing for a colonoscopy.      Stop taking iron (ferrous sulfate), multivitamins that contain iron, and/or fiber supplements (Metamucil, Benefiber, Psyllium husk powder, Fibercon, Bran, etc.).      Stop eating whole kernel corn, popcorn, nuts, and foods that contain seeds. These can stay in the colon for many days and they can clog up the colonoscope.       3 days before procedure     Begin a low-fiber diet (see examples below). No Olestra (a fat substitute).    Consume no more than 10-15 grams of fiber each day.     It is important to stay hydrated. Drink at least eight 8-ounce glasses of water a day.      LOW FIBER DIET   You can have:   Do not have:    Starches: White bread, rolls, biscuits, croissants, Ann toast, white flour tortillas, waffles, pancakes, Telugu toast; white rice, noodles, pasta, macaroni; cooked and peeled potatoes; plain crackers, saltines; cooked farina or cream of rice; puffed rice, corn flakes, Rice Krispies, Special K      Vegetables: tender cooked and canned, vegetable broths     Fruits and fruit juices: Strained fruit juice, canned fruit without seeds or skin (not pineapple), applesauce, pear sauce, ripe bananas, melons (not watermelon)     Milk products: Milk (plain or flavored), cheese, cottage cheese, yogurt (no berries), custard, ice cream       Proteins: Tender, well-cooked ground beef, lamb, veal, ham, pork, chicken,  turkey, fish or organ meat, Tofu, eggs, creamy peanut butter      Fats and condiments:  Margarine, butter, oils, mayonnaise, sour cream, salad dressing, plain gravy; spices, cooked herbs; sugar, clear jelly, honey, syrup      Snacks, sweets and drinks: Pretzels, hard candy; plain cakes and cookies (no nuts or seeds); gelatin, plain pudding, sherbet, Popsicles; coffee, tea, carbonated ( fizzy ) drinks    Starches: Breads or rolls that contain nuts, seeds or fruit; whole wheat or whole grain breads that contain more than 2 grams of fiber per serving; cornbread; corn or whole wheat tortillas; potatoes with skin; brown rice, wild rice, quinoa, kasha (buckwheat), and oatmeal      Vegetables: Any raw or steamed vegetables; vegetables with seeds; corn in any form      Fruits and fruit juices: Prunes, prune juice, raisins and other dried fruits, berries and other fruits with seeds, canned pineapple juices with pulp such as orange, grapefruit, pineapple or tomato juice     Milk products: Any yogurt with nuts, seeds or berries      Proteins: Tough, fibrous meats with gristle; cooked dried beans, peas or lentils; crunchy peanut butter     Fats and condiments: Pickles, olives, relish, horseradish; jam, marmalade, preserves      Snacks, sweets and drinks: Popcorn, nuts, seeds, granola, coconut, candies made with nuts or seeds; all desserts that contain nuts, seeds, raisins and other dried fruits, coconut, whole grains or bran.                                               1 day before procedure     You can have a light, low-fiber breakfast. But drink only clear liquids after 9 a.m. (see list below).    Drink at least eight to ten 8-ounce glasses of water throughout the day. ? ? ? ? ? ? ? ?    Fill the container of Golytely with a full gallon of warm water. Cover and shake until well mixed. Chill for 3 hours in refrigerator until it is time to drink solution.    CLEAR LIQUID DIET:  You can have: Do not have:    Water, tea, coffee  (no milk or cream)   Soda pop, Gatorade (not red or purple)   Coconut water   Jell-O, Popsicles (no milk or fruit pieces - not red or purple)   Fat-free soup broth or bouillon   Plain hard candy, such as clear life savers (not red or purple)   Clear juices and fruit-flavored drinks, such as apple juice, white grape juice, Hi-C, and Nikhil-Aid (not red or purple)  Milk or milk products such as ice cream, malts or shakes, or coffee creamer   Red or purple drinks of any kind such as cranberry juice, grape juice or Nikhil-Aid. Avoid red or purple Jell-O, Popsicles, sorbet, sherbet and candy   Juices with pulp such as orange, grapefruit, pineapple or tomato juice   Cream soups of any kind   Alcohol and beer   Protein drinks or protein powder     Step 1     At 3 PM, take 2 Dulcolax (Bisacodyl) tablets.   At 6 PM, start drinking one 8-ounce glass of Golytely mixture every 15 minutes until the container is HALF empty. Drink each glass quickly. Store the rest in the refrigerator.   Continue to drink clear liquids    Step 2     If you arrive for your procedure BEFORE 11 AM:  At 11 PM, take 2 Dulcolax (Bisacodyl) tablets  At 11 PM, start drinking the other half of the Golytely container. Drink one 8-ounce glass every 15 minutes until the container is empty. Drink each glass quickly.    If you arrive for your procedure AFTER 11 AM:  At 6 AM, take 2 Dulcolax (Bisacodyl) tablets  At 6 AM, start drinking the other half of the Golytely container. Drink one 8-ounce glass of Golytely mixture every 15 minutes until the container is empty. Drink each glass quickly.       Reminders While Drinking Laxatives:     After you start drinking the solution, stay near a toilet. You may have watery stools (diarrhea), mild cramping, bloating, and nausea. You may want to use Vaseline on the skin around your anus after each bowel movement or use wet wipes to prevent irritation. Bowel movements will be liquid and dark in color at first and then should  turn clear yellow in color. Drinking the prepared solution may make you cold, wearing warm clothing can be helpful.    Some find it helpful to:  For added flavor, include a crystal light lemonade packet in each glass of Golytely, rather than mixing it into the entire prepared mixture.   In between each glass, try sucking on a lemon or a piece of hard candy to help diminish the flavor of the preparation.  Drinking from a straw can help minimize the taste of the prepared mixture.    If you have nausea or vomiting during drinking the solution, rinse your mouth with water and take a 15-30 minute break and then continue drinking solution.     Day of procedure     2 hours before your arrival time stop drinking all liquids, including water.   Do not smoke or swallow anything, including water or gum for at least 2 hours before your arrival time. This is a safety issue. Your procedure could be cancelled if you do not follow directions.  No chewing tobacco 6 hours prior to procedure arrival time.     You may take your necessary morning medications with sips of water (4 ounces).   Do not take diabetes medicine by mouth until after your exam.  If you have asthma, bring your inhalers.  Please perform your nebulizer treatments and airway clearance therapy in the morning prior to the procedure (if applicable).    Arrive with a responsible adult who can drive you home and stay with you for up to 24 hours. The medications used during the procedure will make you sleepy, so you won't be able to drive yourself home.   You cannot use public transportation, ride-share services, or non-medical taxi services without a responsible caregiver. Medical transport services are okay, but a caregiver must be there to receive you at your destination.  Please check in with your  when you arrive. Drivers should stay on campus.    Expect to be at the procedure center for about 1.5-2.5 hours.    Do not wear jewelry (i.e. earrings, rings,  necklaces, watches, etc.). Leave your purse, billfold, credit cards, and other valuables at home.      Bring insurance card and ID.       Answers to Commonly Asked Questions     How soon can I eat after the procedure?  You may resume your normal diet when you feel ready, unless advised otherwise by the doctor performing your procedure. We recommend starting with a light meal.   Do not drink alcohol for 24 hours after your procedure.  You may resume normal activities (work, exercise, etc.) after 24 hours.    How will I feel after the procedure?  It is normal to feel bloated and gassy after your procedure. Walking will help move the air through your colon. You can take non-aspirin pain relievers that contain acetaminophen (Tylenol).  If you are having sedation, we require a responsible adult to take you home for your safety. The sedation medicines used to relax you during the procedure can impair your judgement and reaction time, and make you forgetful and possibly a little unsteady.  Do not drive, make any important decisions, or sign any legal documents for 24 hours after your procedure.    When will I get my test results?  You should have your procedure results and any lab results (if applicable) by letter, Pavlokt message, or phone call within 2 weeks. If you have any questions, please call the doctor that referred you for the procedure.    How do I know if my colon is cleaned out?   After completing the bowel prep, your bowel movements should be all liquid and yellow. Your bowel movements will look similar to urine in the toilet. If there are pieces of stool (poop) in the toilet, or if you can't see to the bottom of the toilet, please call our office for advice. Call [phone] and ask to speak with a nurse.    Why is the Golytely bowel prep taken in several steps?   The stool is flushed out by a large wave of fluid going through the colon. Just sipping a large volume of the solution will not achieve the  desired result. Studies have shown that two smaller waves (or more in some cases) are better than one large one.      What if I need to cancel or reschedule my procedure?  Contact our endoscopy scheduling team at [phone], option 2. Monday through Friday, 7:00am-5:00pm.

## 2024-08-23 NOTE — TELEPHONE ENCOUNTER
"Endoscopy Scheduling Screen    Have you had a positive Covid test in the last 14 days?  No    What is your communication preference for Instructions and/or Bowel Prep?   Mail/USPS    What insurance is in the chart?  Other:  [facility]    Ordering/Referring Provider:   VICKY QUICK        (If ordering provider performs procedure, schedule with ordering provider unless otherwise instructed. )    BMI: Estimated body mass index is 31.93 kg/m  as calculated from the following:    Height as of 8/19/24: 1.676 m (5' 6\").    Weight as of 8/19/24: 89.7 kg (197 lb 12.8 oz).     Sedation Ordered  moderate sedation.   If patient BMI > 50 do not schedule in [facility].    If patient BMI > 45 do not schedule at [facility].    Are you taking methadone or Suboxone?  No    Have you had difficulties, pain, or discomfort during past endoscopy procedures?  No    Are you taking any prescription medications for pain 3 or more times per week?   NO, No RN review required.    Do you have a history of malignant hyperthermia?  No    (Females) Are you currently pregnant?   No     Have you been diagnosed or told you have pulmonary hypertension?   No    Do you have an LVAD?  No    Have you been told you have moderate to severe sleep apnea?  No    Have you been told you have COPD, asthma, or any other lung disease?  No    Do you have any heart conditions?  No     Have you ever had or are you waiting for an organ transplant?  No. Continue scheduling, no site restrictions.    Have you had a stroke or transient ischemic attack (TIA aka \"mini stroke\" in the last 6 months?   No    Have you been diagnosed with or been told you have cirrhosis of the liver?   No    Are you currently on dialysis?   No    Do you need assistance transferring?   No    BMI: Estimated body mass index is 31.93 kg/m  as calculated from the following:    Height as of 8/19/24: 1.676 m (5' 6\").    Weight as of 8/19/24: 89.7 kg (197 lb 12.8 oz).     Is patients BMI > 40 and scheduling location " [facility]?  No    Do you take an injectable medication for weight loss or diabetes (excluding insulin)?  No    Do you take the medication Naltrexone?  No    Do you take blood thinners?  No       Prep   Are you currently on dialysis or do you have chronic kidney disease?  No    Do you have a diagnosis of diabetes?  No    Do you have a diagnosis of cystic fibrosis (CF)?  No    On a regular basis do you go 3 -5 days between bowel movements?  No    BMI > 40?  No    Preferred Pharmacy:        [facility] [place] [id number] [facility] [address]  Phone: [phone] Fax: [phone]      Final Scheduling Details     Procedure scheduled  Colonoscopy    Surgeon:  Nguyen     Date of procedure:  10/2     Pre-OP / PAC:   No - Not required for this site.    Location  [facility] - Patient preference.    Sedation   Moderate Sedation - Per order.      Patient Reminders:   You will receive a call from a Nurse to review instructions and health history.  This assessment must be completed prior to your procedure.  Failure to complete the Nurse assessment may result in the procedure being cancelled.      On the day of your procedure, please designate an adult(s) who can drive you home stay with you for the next 24 hours. The medicines used in the exam will make you sleepy. You will not be able to drive.      You cannot take public transportation, ride share services, or non-medical taxi service without a responsible caregiver.  Medical transport services are allowed with the requirement that a responsible caregiver will receive you at your destination.  We require that drivers and caregivers are confirmed prior to your procedure.

## 2024-09-11 RX ORDER — BISACODYL 5 MG/1
TABLET, DELAYED RELEASE ORAL
Qty: 4 TABLET | Refills: 0 | Status: ON HOLD | OUTPATIENT
Start: 2024-09-11 | End: 2024-10-02

## 2024-09-11 NOTE — TELEPHONE ENCOUNTER
Standard Golytely Bowel Prep recommended due to diabetes.  Instructions were sent via letter. Bowel prep was sent 9/11/2024 to    [facility] - [place], [place] - [address] [facility].

## 2024-09-24 ENCOUNTER — TELEPHONE (OUTPATIENT)
Dept: GASTROENTEROLOGY | Facility: CLINIC | Age: 49
End: 2024-09-24
Payer: COMMERCIAL

## 2024-09-24 NOTE — TELEPHONE ENCOUNTER
Pre assessment completed for upcoming procedure.   (Please see previous telephone encounter notes for complete details)      Procedure details:    Arrival time and facility location reviewed.    Pre op exam needed? No.    Designated  policy reviewed. Instructed to have someone stay 6  hours post procedure.       Medication review:    Medications reviewed. Please see supporting documentation below. Holding recommendations discussed (if applicable).   N/A      Prep for procedure:     Procedure prep instructions reviewed.        Any additional information needed:  N/A      Patient  verbalized understanding and had no questions or concerns at this time.      Leta Eaton RN  Endoscopy Procedure Pre Assessment   [phone] option 2

## 2024-09-24 NOTE — TELEPHONE ENCOUNTER
Pre visit planning completed.      Procedure details:    Patient scheduled for Colonoscopy on 10/2/24.     Arrival time: 1445. Procedure time 1530    Facility location: [facility]; [address] [place] [address]. Check in location: Main entrance, door #1 on the North side of the building under roundabout awning. DO NOT GO TO SURGERY/ED ENTRANCE.     Sedation type: Conscious sedation     Pre op exam needed? No.    Indication for procedure: screening       Chart review:     Electronic implanted devices? No    Recent diagnosis of diverticulitis within the last 6 weeks? No      Medication review:    Diabetic? Yes. Diabetic medication HOLDING recommendations: Oral diabetic medications: Metformin (glucophage): HOLD day of procedure.    Anticoagulants? No    Weight loss medication/injectable? No.    Other medication HOLDING recommendations:  N/A      Prep for procedure:     Bowel prep recommendation: Standard Golytely. Bowel prep prescription [place], [place] - [address] [facility] by [facility] team.     Due to: diabetes.     Prep instructions sent via letter by [facility] team.         Kathryn Liu RN  Endoscopy Procedure Pre Assessment RN  [phone] option 2

## 2024-09-27 ENCOUNTER — HOSPITAL ENCOUNTER (OUTPATIENT)
Dept: MAMMOGRAPHY | Facility: CLINIC | Age: 49
Discharge: HOME | End: 2024-09-27
Attending: INTERNAL MEDICINE | Admitting: INTERNAL MEDICINE
Payer: COMMERCIAL

## 2024-09-27 DIAGNOSIS — Z00.00 ENCOUNTER FOR PREVENTATIVE ADULT HEALTH CARE EXAMINATION: ICD-10-CM

## 2024-09-27 DIAGNOSIS — Z12.31 VISIT FOR SCREENING MAMMOGRAM: ICD-10-CM

## 2024-09-27 PROCEDURE — 77063 BREAST TOMOSYNTHESIS BI: CPT

## 2024-10-02 ENCOUNTER — HOSPITAL ENCOUNTER (OUTPATIENT)
Facility: CLINIC | Age: 49
Discharge: HOME | End: 2024-10-02
Attending: INTERNAL MEDICINE | Admitting: INTERNAL MEDICINE
Payer: COMMERCIAL

## 2024-10-02 VITALS
HEIGHT: 66 IN | BODY MASS INDEX: 31.66 KG/M2 | HEART RATE: 73 BPM | WEIGHT: 197 LBS | SYSTOLIC BLOOD PRESSURE: 137 MMHG | OXYGEN SATURATION: 95 % | DIASTOLIC BLOOD PRESSURE: 92 MMHG | RESPIRATION RATE: 14 BRPM

## 2024-10-02 LAB — COLONOSCOPY: NORMAL

## 2024-10-02 PROCEDURE — 250N000011 HC RX IP 250 OP 636: Performed by: INTERNAL MEDICINE

## 2024-10-02 PROCEDURE — 45378 DIAGNOSTIC COLONOSCOPY: CPT | Performed by: INTERNAL MEDICINE

## 2024-10-02 PROCEDURE — G0121 COLON CA SCRN NOT HI RSK IND: HCPCS | Performed by: INTERNAL MEDICINE

## 2024-10-02 PROCEDURE — G0500 MOD SEDAT ENDO SERVICE >5YRS: HCPCS | Performed by: INTERNAL MEDICINE

## 2024-10-02 RX ORDER — PROCHLORPERAZINE MALEATE 10 MG
10 TABLET ORAL EVERY 6 HOURS PRN
Status: CANCELLED | OUTPATIENT
Start: 2024-10-02

## 2024-10-02 RX ORDER — NALOXONE HYDROCHLORIDE 0.4 MG/ML
0.2 INJECTION, SOLUTION INTRAMUSCULAR; INTRAVENOUS; SUBCUTANEOUS
Status: DISCONTINUED | OUTPATIENT
Start: 2024-10-02 | End: 2024-10-02 | Stop reason: HOSPADM

## 2024-10-02 RX ORDER — NALOXONE HYDROCHLORIDE 0.4 MG/ML
0.4 INJECTION, SOLUTION INTRAMUSCULAR; INTRAVENOUS; SUBCUTANEOUS
Status: DISCONTINUED | OUTPATIENT
Start: 2024-10-02 | End: 2024-10-02 | Stop reason: HOSPADM

## 2024-10-02 RX ORDER — ONDANSETRON 2 MG/ML
4 INJECTION INTRAMUSCULAR; INTRAVENOUS
Status: DISCONTINUED | OUTPATIENT
Start: 2024-10-02 | End: 2024-10-02 | Stop reason: HOSPADM

## 2024-10-02 RX ORDER — ONDANSETRON 4 MG/1
4 TABLET, ORALLY DISINTEGRATING ORAL EVERY 6 HOURS PRN
Status: CANCELLED | OUTPATIENT
Start: 2024-10-02

## 2024-10-02 RX ORDER — EPINEPHRINE 1 MG/ML
0.1 INJECTION, SOLUTION, CONCENTRATE INTRAVENOUS
Status: DISCONTINUED | OUTPATIENT
Start: 2024-10-02 | End: 2024-10-02 | Stop reason: HOSPADM

## 2024-10-02 RX ORDER — FENTANYL CITRATE 50 UG/ML
50-100 INJECTION, SOLUTION INTRAMUSCULAR; INTRAVENOUS EVERY 5 MIN PRN
Status: DISCONTINUED | OUTPATIENT
Start: 2024-10-02 | End: 2024-10-02 | Stop reason: HOSPADM

## 2024-10-02 RX ORDER — LIDOCAINE 40 MG/G
CREAM TOPICAL
Status: DISCONTINUED | OUTPATIENT
Start: 2024-10-02 | End: 2024-10-02 | Stop reason: HOSPADM

## 2024-10-02 RX ORDER — FLUMAZENIL 0.1 MG/ML
0.2 INJECTION, SOLUTION INTRAVENOUS
Status: CANCELLED | OUTPATIENT
Start: 2024-10-02 | End: 2024-10-03

## 2024-10-02 RX ORDER — SIMETHICONE 40MG/0.6ML
133 SUSPENSION, DROPS(FINAL DOSAGE FORM)(ML) ORAL
Status: DISCONTINUED | OUTPATIENT
Start: 2024-10-02 | End: 2024-10-02 | Stop reason: HOSPADM

## 2024-10-02 RX ORDER — ATROPINE SULFATE 0.1 MG/ML
1 INJECTION INTRAVENOUS
Status: DISCONTINUED | OUTPATIENT
Start: 2024-10-02 | End: 2024-10-02 | Stop reason: HOSPADM

## 2024-10-02 RX ORDER — DIPHENHYDRAMINE HYDROCHLORIDE 50 MG/ML
25-50 INJECTION INTRAMUSCULAR; INTRAVENOUS
Status: DISCONTINUED | OUTPATIENT
Start: 2024-10-02 | End: 2024-10-02 | Stop reason: HOSPADM

## 2024-10-02 RX ORDER — FLUMAZENIL 0.1 MG/ML
0.2 INJECTION, SOLUTION INTRAVENOUS
Status: DISCONTINUED | OUTPATIENT
Start: 2024-10-02 | End: 2024-10-02 | Stop reason: HOSPADM

## 2024-10-02 RX ORDER — ONDANSETRON 2 MG/ML
4 INJECTION INTRAMUSCULAR; INTRAVENOUS EVERY 6 HOURS PRN
Status: CANCELLED | OUTPATIENT
Start: 2024-10-02

## 2024-10-02 RX ADMIN — MIDAZOLAM 2 MG: 1 INJECTION INTRAMUSCULAR; INTRAVENOUS at 15:11

## 2024-10-02 RX ADMIN — FENTANYL CITRATE 50 MCG: 50 INJECTION, SOLUTION INTRAMUSCULAR; INTRAVENOUS at 15:18

## 2024-10-02 RX ADMIN — MIDAZOLAM 1 MG: 1 INJECTION INTRAMUSCULAR; INTRAVENOUS at 15:18

## 2024-10-02 RX ADMIN — FENTANYL CITRATE 100 MCG: 50 INJECTION, SOLUTION INTRAMUSCULAR; INTRAVENOUS at 15:11

## 2024-10-02 ASSESSMENT — ACTIVITIES OF DAILY LIVING (ADL)
ADLS_ACUITY_SCORE: 35
ADLS_ACUITY_SCORE: 35

## 2025-02-05 NOTE — COMMUNITY RESOURCES LIST (ENGLISH)
09/22/2023   [facility] - Outpatient Clinics  N/A  For additional resource needs, please contact your health insurance member services or your primary care team.  Phone: [phone]   Email: N/A   Address: [address] [place] [address]   Hours: N/A        Financial Stability       Rent and mortgage payment assistance  1  [address] [place] [address] Distance: 1.7 miles      In-Person, Phone/Virtual   [address] [place] [address]  Language: English, Sammarinese  Hours: Mon - Thu 9:00 AM - 4:00 PM  Fees: Free   Phone: (914) 971-8697 Email: info@[employer].HealthSpot Website: https://Axela/     2  [address] [place] [address] - Rent payment assistance Distance: 5.55 miles      In-Person, Phone/Virtual   [address] [place] [address]  Language: English  Hours: Mon 8:00 AM - 4:00 PM , Tue 8:00 AM - 7:00 PM , Wed - Thu 8:00 AM - 4:00 PM  Fees: Free   Phone: (856) 802-2903 Email: info@[employer]Spot Website: https://Axela/resources/resource-centers/          Important Numbers & Websites       [address]  Poison Control   (478) 980-1407 [place]  Suicide and Crisis Lifeline   [address] [place], [place] [address]line.org  Childhelp [place] Child Abuse Hotline   747.818.8701 Childhelphotline.org  National Sexual Assault Hotline   (267) 430-8211 (HOPE) Rainn.org  National Runaway Safeline   (292) 754-3832 (RUNAWAY) 1800runaway.org  Pregnancy & Postpartum Support [place]   Call/text 906-697-8721 Ppsupport[place].org  Substance Abuse National Helpline ([facility]   791-305-HELP (7158) Findtreatment.gov  Emergency Services   911     [de-identified] : KALEE SEXTON is a 62 year old female being seen for right shoulder pain. She reports feeling good relief from the cortisone injection she received at her previous appointment on 1/17/2025. She states she is s/p TKR with Dr. Estrada on 7/1/2024. She states she began to feel her pain return while ambulating with a walker following surgery. She states the pain slowly increased and has become more intense over the past month. She presents today for another cortisone injection to her right shoulder.  She has moderately advanced osteoarthritis on x-rays and MRIs from about 2 years ago.

## 2025-04-09 DIAGNOSIS — I10 BENIGN ESSENTIAL HYPERTENSION: ICD-10-CM

## 2025-04-09 DIAGNOSIS — Z00.00 ENCOUNTER FOR PREVENTATIVE ADULT HEALTH CARE EXAMINATION: ICD-10-CM

## 2025-04-09 NOTE — TELEPHONE ENCOUNTER
Medication Question or Refill        What medication are you calling about (include dose and sig)?: Blood pressure medication     Preferred Pharmacy:       [place], [place] - [address]  [address]  [facility] [address]  Phone: [phone] Fax: [phone] Alternate Fax: [phone], [phone]      Controlled Substance Agreement on file:   CSA -- Patient Level:    CSA: None found at the patient level.       Who prescribed the medication?: Dylan Amato     Do you need a refill? Yes    When did you use the medication last? 4/9/25    Patient offered an appointment? No    Do you have any questions or concerns?  No    If you can text, that's what she would prefer.     Okay to leave a detailed message?: Yes at Cell number on file:    Telephone Information:   Mobile [phone]

## 2025-04-10 RX ORDER — LOSARTAN POTASSIUM 50 MG/1
50 TABLET ORAL DAILY
Qty: 90 TABLET | Refills: 3 | OUTPATIENT
Start: 2025-04-10

## 2025-05-09 PROBLEM — Z78.0 MENOPAUSE: Status: ACTIVE | Noted: 2025-05-09

## 2025-05-09 PROBLEM — E78.5 HYPERLIPIDEMIA LDL GOAL <100: Status: ACTIVE | Noted: 2025-05-09

## 2025-05-12 ENCOUNTER — RESULTS FOLLOW-UP (OUTPATIENT)
Dept: INTERNAL MEDICINE | Facility: CLINIC | Age: 50
End: 2025-05-12

## 2025-05-12 DIAGNOSIS — E11.65 TYPE 2 DIABETES MELLITUS WITH HYPERGLYCEMIA, WITHOUT LONG-TERM CURRENT USE OF INSULIN (H): Primary | ICD-10-CM

## 2025-05-15 RX ORDER — METFORMIN HYDROCHLORIDE 500 MG/1
500 TABLET, EXTENDED RELEASE ORAL
Qty: 90 TABLET | Refills: 3 | Status: SHIPPED | OUTPATIENT
Start: 2025-05-15

## 2025-08-11 ENCOUNTER — OFFICE VISIT (OUTPATIENT)
Dept: URGENT CARE | Facility: URGENT CARE | Age: 50
End: 2025-08-11
Payer: COMMERCIAL

## 2025-08-11 VITALS
RESPIRATION RATE: 18 BRPM | DIASTOLIC BLOOD PRESSURE: 91 MMHG | BODY MASS INDEX: 31.66 KG/M2 | OXYGEN SATURATION: 97 % | HEART RATE: 67 BPM | TEMPERATURE: 98.5 F | HEIGHT: 66 IN | SYSTOLIC BLOOD PRESSURE: 149 MMHG | WEIGHT: 197 LBS

## 2025-08-11 DIAGNOSIS — K11.20 SIALADENITIS: Primary | ICD-10-CM

## 2025-08-11 PROCEDURE — 3080F DIAST BP >= 90 MM HG: CPT | Performed by: PREVENTIVE MEDICINE

## 2025-08-11 PROCEDURE — 99214 OFFICE O/P EST MOD 30 MIN: CPT | Performed by: PREVENTIVE MEDICINE

## 2025-08-11 PROCEDURE — 3077F SYST BP >= 140 MM HG: CPT | Performed by: PREVENTIVE MEDICINE

## 2025-08-28 ENCOUNTER — PATIENT OUTREACH (OUTPATIENT)
Dept: CARE COORDINATION | Facility: CLINIC | Age: 50
End: 2025-08-28
Payer: COMMERCIAL

## (undated) DEVICE — SYR EAR BULB 3OZ 0035830

## (undated) DEVICE — SU VICRYL 3-0 SH 27" UND J416H

## (undated) DEVICE — DRAPE BREAST/CHEST 29420

## (undated) DEVICE — GLOVE PROTEXIS BLUE W/NEU-THERA 8.0  2D73EB80

## (undated) DEVICE — PREP SKIN SCRUB TRAY 4461A

## (undated) DEVICE — KIT ENDO TURNOVER/PROCEDURE W/CLEAN A SCOPE LINERS 103888

## (undated) DEVICE — LINEN FULL SHEET 5511

## (undated) DEVICE — SU PDS II 3-0 SH 27" Z316H

## (undated) DEVICE — SUCTION CANISTER MEDIVAC LINER 3000ML W/LID 65651-530

## (undated) DEVICE — TUBING SMOKE EVAC 3/8"X10' E3645

## (undated) DEVICE — LINEN HALF SHEET 5512

## (undated) DEVICE — BAG CLEAR TRASH 1.3M 39X33" P4040C

## (undated) DEVICE — CLIP ETHICON LIGACLIP MED WHITE LT200

## (undated) DEVICE — SOL NACL 0.9% IRRIG 1000ML BOTTLE 2F7124

## (undated) DEVICE — SYR 10ML FINGER CONTROL W/O NDL 309695

## (undated) DEVICE — TUBING SUCTION 12"X1/4" N612

## (undated) DEVICE — SUCTION TIP YANKAUER W/O VENT K86

## (undated) DEVICE — DRSG STERI STRIP 1/2X4" R1547

## (undated) DEVICE — SU SILK 3-0 SH 30" K832H

## (undated) DEVICE — LINEN TOWEL PACK X5 5464

## (undated) DEVICE — LINEN TOWEL PACK X10 5473

## (undated) DEVICE — GLOVE PROTEXIS W/NEU-THERA 7.5  2D73TE75

## (undated) DEVICE — PREP SCRUB SOL EXIDINE 4% CHG 4OZ 29002-404

## (undated) DEVICE — SOL ADH LIQUID BENZOIN SWAB 0.6ML C1544

## (undated) DEVICE — PACK MINOR CUSTOM RIDGES SBA32RMRMA

## (undated) DEVICE — SU PDS II 4-0 P-3 18" Z494G

## (undated) DEVICE — SLEEVE PROTECTIVE BREAST BIOPSY  GMSLV001-10

## (undated) DEVICE — DRAPE LAP W/ARMBOARD 29410

## (undated) DEVICE — SU VICRYL 4-0 PS-2 18" UND J496H

## (undated) DEVICE — NDL 25GA 1.5" 305127

## (undated) DEVICE — ESU PENCIL W/SMOKE EVAC ROCKER E2350HS

## (undated) DEVICE — PAD CHUX UNDERPAD 23X24" 7136

## (undated) RX ORDER — BUPIVACAINE HYDROCHLORIDE AND EPINEPHRINE 2.5; 5 MG/ML; UG/ML
INJECTION, SOLUTION EPIDURAL; INFILTRATION; INTRACAUDAL; PERINEURAL
Status: DISPENSED
Start: 2018-10-19

## (undated) RX ORDER — DEXAMETHASONE SODIUM PHOSPHATE 4 MG/ML
INJECTION, SOLUTION INTRA-ARTICULAR; INTRALESIONAL; INTRAMUSCULAR; INTRAVENOUS; SOFT TISSUE
Status: DISPENSED
Start: 2018-10-19

## (undated) RX ORDER — PROPOFOL 10 MG/ML
INJECTION, EMULSION INTRAVENOUS
Status: DISPENSED
Start: 2018-10-19

## (undated) RX ORDER — FENTANYL CITRATE 50 UG/ML
INJECTION, SOLUTION INTRAMUSCULAR; INTRAVENOUS
Status: DISPENSED
Start: 2024-10-02

## (undated) RX ORDER — FENTANYL CITRATE 50 UG/ML
INJECTION, SOLUTION INTRAMUSCULAR; INTRAVENOUS
Status: DISPENSED
Start: 2018-10-19

## (undated) RX ORDER — LIDOCAINE HYDROCHLORIDE 10 MG/ML
INJECTION, SOLUTION EPIDURAL; INFILTRATION; INTRACAUDAL; PERINEURAL
Status: DISPENSED
Start: 2018-10-19

## (undated) RX ORDER — ONDANSETRON 2 MG/ML
INJECTION INTRAMUSCULAR; INTRAVENOUS
Status: DISPENSED
Start: 2018-10-19

## (undated) RX ORDER — GLYCOPYRROLATE 0.2 MG/ML
INJECTION INTRAMUSCULAR; INTRAVENOUS
Status: DISPENSED
Start: 2018-10-19

## (undated) RX ORDER — CEFAZOLIN SODIUM 2 G/100ML
INJECTION, SOLUTION INTRAVENOUS
Status: DISPENSED
Start: 2018-10-19